# Patient Record
Sex: MALE | HISPANIC OR LATINO | Employment: OTHER | URBAN - METROPOLITAN AREA
[De-identification: names, ages, dates, MRNs, and addresses within clinical notes are randomized per-mention and may not be internally consistent; named-entity substitution may affect disease eponyms.]

---

## 2017-07-13 ENCOUNTER — OFFICE VISIT (OUTPATIENT)
Dept: MEDICAL GROUP | Facility: MEDICAL CENTER | Age: 72
End: 2017-07-13
Payer: MEDICARE

## 2017-07-13 ENCOUNTER — TELEPHONE (OUTPATIENT)
Dept: MEDICAL GROUP | Facility: MEDICAL CENTER | Age: 72
End: 2017-07-13

## 2017-07-13 VITALS
OXYGEN SATURATION: 94 % | HEIGHT: 66 IN | DIASTOLIC BLOOD PRESSURE: 72 MMHG | BODY MASS INDEX: 24.2 KG/M2 | SYSTOLIC BLOOD PRESSURE: 122 MMHG | HEART RATE: 78 BPM | TEMPERATURE: 99 F | RESPIRATION RATE: 13 BRPM | WEIGHT: 150.6 LBS

## 2017-07-13 DIAGNOSIS — J43.9 PULMONARY EMPHYSEMA, UNSPECIFIED EMPHYSEMA TYPE (HCC): ICD-10-CM

## 2017-07-13 DIAGNOSIS — E55.9 VITAMIN D DEFICIENCY: ICD-10-CM

## 2017-07-13 DIAGNOSIS — L20.82 FLEXURAL ECZEMA: ICD-10-CM

## 2017-07-13 DIAGNOSIS — F17.200 TOBACCO DEPENDENCE: ICD-10-CM

## 2017-07-13 PROBLEM — L20.84 INTRINSIC ECZEMA: Status: ACTIVE | Noted: 2017-07-13

## 2017-07-13 PROCEDURE — 99204 OFFICE O/P NEW MOD 45 MIN: CPT | Performed by: PHYSICIAN ASSISTANT

## 2017-07-13 RX ORDER — BETAMETHASONE DIPROPIONATE 0.5 MG/G
CREAM TOPICAL 2 TIMES DAILY
COMMUNITY
End: 2017-07-13

## 2017-07-13 ASSESSMENT — PATIENT HEALTH QUESTIONNAIRE - PHQ9: CLINICAL INTERPRETATION OF PHQ2 SCORE: 0

## 2017-07-13 NOTE — MR AVS SNAPSHOT
"        Jaskaran Tuckerselina   2017 10:00 AM   Office Visit   MRN: [id number]    Department:  [place] [address]   Dept Phone:  [phone]    Description:  Male : 1945   Provider:  Nate Burgess PA-C           Reason for Visit     Establish Care Med Refill for Betameth      Allergies as of 2017     No Known Allergies      You were diagnosed with     Flexural eczema   [611020]       Vitamin D deficiency   [2554090]       Tobacco dependence   [353819]       Pulmonary emphysema, unspecified emphysema type (CMS-[facility])   [8682129]         Vital Signs     Blood Pressure Pulse Temperature Respirations Height Weight    122/72 mmHg 78 37.2 °C (99 °F) 13 1.676 m (5' 6\") 68.312 kg (150 lb 9.6 oz)    Body Mass Index Oxygen Saturation Smoking Status             24.32 kg/m2 94% Current Every Day Smoker         Basic Information     Date Of Birth Sex Race Ethnicity Preferred Language    1945 Male  or   Origin (Kiswahili,Honduran,Qatari,Mc, etc) English      Problem List              ICD-10-CM Priority Class Noted - Resolved    Flexural eczema L20.82   2017 - Present    Tobacco dependence F17.200   2017 - Present    Vitamin D deficiency E55.9   2017 - Present    Pulmonary emphysema (CMS-HCC) J43.9   2017 - Present      Health Maintenance        Date Due Completion Dates    IMM DTaP/Tdap/Td Vaccine (1 - Tdap) 10/18/1966 ---    IMM ZOSTER VACCINE 10/18/2007 ---    IMM PNEUMOCOCCAL 65+ (ADULT) LOW/MEDIUM RISK SERIES (1 of 2 - PCV13) 10/18/2012 ---    IMM INFLUENZA (1) 2017 ---    COLONOSCOPY 2022 (Done)    Override on 2012: Done (10 year surveillance advised)            Current Immunizations     No immunizations on file.      Below and/or attached are the medications your provider expects you to take. Review all of your home medications and newly ordered medications with your provider and/or pharmacist. Follow medication instructions " as directed by your provider and/or pharmacist. Please keep your medication list with you and share with your provider. Update the information when medications are discontinued, doses are changed, or new medications (including over-the-counter products) are added; and carry medication information at all times in the event of emergency situations     Allergies:  No Known Allergies          Medications  Valid as of: July 13, 2017 - 10:41 AM    Generic Name Brand Name Tablet Size Instructions for use    Betamethasone Valerate (Ointment) VALISONE 0.1 % Apply twice daily as needed.        .                 Medicines prescribed today were sent to:     [facility] [address] [place] [address], [place] - [address] [place] [address]    Phone: [phone] Fax: [phone]    Open 24 Hours?: No      Medication refill instructions:       If your prescription bottle indicates you have medication refills left, it is not necessary to call your provider’s office. Please contact your pharmacy and they will refill your medication.    If your prescription bottle indicates you do not have any refills left, you may request refills at any time through one of the following ways: The online [employer] system (except Urgent Care), by calling your provider’s office, or by asking your pharmacy to contact your provider’s office with a refill request. Medication refills are processed only during regular business hours and may not be available until the next business day. Your provider may request additional information or to have a follow-up visit with you prior to refilling your medication.   *Please Note: Medication refills are assigned a new Rx number when refilled electronically. Your pharmacy may indicate that no refills were authorized even though a new prescription for the same medication is available at the pharmacy. Please request the medicine by name with the pharmacy before contacting your provider for a refill.            [employer] Access Code: Activation code not generated  Current [employer] Status: Active          Quit Tobacco Information     Do you want to quit using tobacco?    Quitting tobacco decreases risks of cancer, heart and lung disease, increases life expectancy, improves sense of taste and smell, and increases spending money, among other benefits.    If you are thinking about quitting, we can help.  • [facility] Quit Tobacco Program: 852.118.8594  o Program occurs weekly for four weeks and includes pharmacist consultation on products to support quitting smoking or chewing tobacco. A provider referral is needed for pharmacist consultation.  • Tobacco Users Help Hotline: 4-785-QUIT-NOW (530-8527) or https://[place].quitlogix.org/  o Free, confidential telephone and online coaching for [place] residents. Sessions are designed on a schedule that is convenient for you. Eligible clients receive free nicotine replacement therapy.  • Nationally: www.smokefree.gov  o Information and professional assistance to support both immediate and long-term needs as you become, and remain, a non-smoker. Smokefree.gov allows you to choose the help that best fits your needs.

## 2017-07-13 NOTE — ASSESSMENT & PLAN NOTE
Has tried every smoking cessation medication such as Chantix, Wellbutrin and patches and gum without any success.

## 2017-07-13 NOTE — Clinical Note
[employer]  Nate Burgess PA-C  [address]  [place] [address]  Fax: [phone]   Authorization for Release/Disclosure of   Protected Health Information   Name: JASKARAN PARKINSON : 1945 SSN: [id number]   Address: [address]   [place] [address] Phone:    [phone] (home)    I authorize the entity listed below to release/disclose the PHI below to:   [employer] [facility]/Nate Burgess PA-C and Nate Burgess PA-C   Provider or Entity Name: Dr. Levi Rangel     Address   [facility], [place], [place] Phone:      Fax:     Reason for request: continuity of care   Information to be released:    [ X ] LAST COLONOSCOPY,  including any PATH REPORT and follow-up  [  ] LAST FIT/COLOGUARD RESULT [  ] LAST DEXA  [  ] LAST MAMMOGRAM  [  ] LAST PAP  [  ] LAST LABS [  ] RETINA EXAM REPORT  [  ] IMMUNIZATION RECORDS  [  ] Release all info      [  ] Check here and initial the line next to each item to release ALL health information INCLUDING  _____ Care and treatment for drug and / or alcohol abuse  _____ HIV testing, infection status, or AIDS  _____ Genetic Testing    DATES OF SERVICE OR TIME PERIOD TO BE DISCLOSED: _____________  I understand and acknowledge that:  * This Authorization may be revoked at any time by you in writing, except if your health information has already been used or disclosed.  * Your health information that will be used or disclosed as a result of you signing this authorization could be re-disclosed by the recipient. If this occurs, your re-disclosed health information may no longer be protected by State or Federal laws.  * You may refuse to sign this Authorization. Your refusal will not affect your ability to obtain treatment.  * This Authorization becomes effective upon signing and will  on (date) __________.      If no date is indicated, this Authorization will  one (1) year from the signature date.    Name: Jaskaran Parkinson    Signature:   Date:     7/13/2017       PLEASE FAX REQUESTED RECORDS BACK TO: [phone]

## 2017-07-13 NOTE — TELEPHONE ENCOUNTER
Phone Number Called: [phone] (home)     Message: Left msg for patient to call back so we can scheduled appt for wellness visit with HC. Please transfer to [phone].    Left Message for patient to call back: yes

## 2017-07-13 NOTE — PROGRESS NOTES
"Subjective:   Jaskaran Le is a 71 y.o. male here today for establishing care and for refills of steroid medication for a chronic history of eczema.    Flexural eczema  This is a pleasant 71-year-old male who is here today to get a refill of steroid cream. He has flares of eczema on the arms and the head intermittently throughout his life. Typically he will use a cream and mix it with Cetaphil. The concoction will last for many months. Recently he ran out of his steroid cream and has had a flare on the flexor aspects of his arms.    He recently moved here from [place]. He is living with a daughter and is active with his grandchildren and family here in [place]. He is . He does have a RV that uses often.    He states had a colonoscopy a few years ago and was told not to come back for 10 more years. Also had an abdominal ultrasound to rule out aortic aneurysm. I was negative. Usually obtains labs yearly.    Tobacco dependence  Has tried every smoking cessation medication such as Chantix, Wellbutrin and patches and gum without any success.       Current medicines (including changes today)  Current Outpatient Prescriptions   Medication Sig Dispense Refill   • betamethasone valerate (VALISONE) 0.1 % Ointment Apply twice daily as needed. 45 g 3     No current facility-administered medications for this visit.     He  has no past medical history on file.    ROS   No chest pain, no shortness of breath, no abdominal pain and all other systems were reviewed and are negative.       Objective:     Blood pressure 122/72, pulse 78, temperature 37.2 °C (99 °F), resp. rate 13, height 1.676 m (5' 6\"), weight 68.312 kg (150 lb 9.6 oz), SpO2 94 %. Body mass index is 24.32 kg/(m^2).   Physical Exam:  Constitutional: Alert, no distress.  Skin: Warm, dry, good turgor, antecubital fossa and anterior aspect of his forearms appear with a soft erythematous raised dry rash. No excoriations noted.  Eye: Equal, round and " reactive, conjunctiva clear, lids normal.  ENMT: Lips without lesions, good dentition, oropharynx clear.  Neck: Trachea midline, no masses.   Lymph: No cervical or supraclavicular lymphadenopathy  Respiratory: Unlabored respiratory effort, lungs appear clear, no wheezes.  Cardiovascular: Regular rate and rhythm, no edema.  Abdomen: Soft, non-tender, no masses.  Psych: Alert and oriented x3, normal affect and mood.        Assessment and Plan:   The following treatment plan was discussed    1. Flexural eczema  Chronic condition with recent exacerbation. Prior prescription for betamethasone ointment as directed. Follow-up as needed. We'll obtain previous medical records from his PCP in [place].    2. Tobacco dependence  In the future will refer to lung cancer screening program.    Addendum: Once medical records are obtained from his previous medical provider we will schedule him for his annual wellness exam.      Followup: Return in about 1 month (around 8/13/2017), or if symptoms worsen or fail to improve.    Please note that this dictation was created using voice recognition software. I have made every reasonable attempt to correct obvious errors, but I expect that there are errors of grammar and possibly content that I did not discover before finalizing the note.

## 2017-07-13 NOTE — ASSESSMENT & PLAN NOTE
This is a pleasant 71-year-old male who is here today to get a refill of steroid cream. He has flares of eczema on the arms and the head intermittently throughout his life. Typically he will use a cream and mix it with Cetaphil. The concoction will last for many months. Recently he ran out of his steroid cream and has had a flare on the flexor aspects of his arms.    He recently moved here from [place]. He is living with a daughter and is active with his grandchildren and family here in [place]. He is . He does have a RV that uses often.    He had a colonoscopy in 2012 in November. At 8.4 cm polyp that was hyperplastic in nature. Advised follow-up in 10 years for surveillance colonoscopy. Ultrasound of his abdominal showed no AAA. Lab profile did show low vitamin D level.    Appears also that he had a Pneumovax 23 in the past and did receive a Prevnar 13 in 2015.

## 2018-03-08 ENCOUNTER — PATIENT OUTREACH (OUTPATIENT)
Dept: HEALTH INFORMATION MANAGEMENT | Facility: OTHER | Age: 73
End: 2018-03-08

## 2018-03-08 NOTE — PROGRESS NOTES
1. Attempt #: Final  2. HealthConnect Verified: yes    3. Verify PCP: yes    4. Care Team Updated:       •   DME Company (gait device, O2, CPAP, etc.): NO       •   Other Specialists (eye doctor, derm, GYN, cardiology, endo, etc): NO    5.  Reviewed/Updated the following with patient:       •   Communication Preference Obtained? YES       •   Preferred Pharmacy? YES       •   Preferred Lab? YES       •   Family History (document living status of immediate family members and if + hx of cancer, diabetes, hypertension, hyperlipidemia, heart attack, stroke) YES. Was Abstract Encounter opened and chart updated? YES    6. [employer] Activation: already active    7. [employer] Rich: yes    8. Annual Wellness Visit Scheduling  Scheduling Status:Scheduled      9. Care Gap Scheduling (Attempt to Schedule EACH Overdue Care Gap!)     Health Maintenance Due   Topic Date Due   • Annual Wellness Visit  1945   • IMM DTaP/Tdap/Td Vaccine (1 - Tdap) 10/18/1966   • IMM ZOSTER VACCINE  10/18/2007   • IMM PNEUMOCOCCAL 65+ (ADULT) LOW/MEDIUM RISK SERIES (1 of 2 - PCV13) 10/18/2012   • IMM INFLUENZA (1) 09/01/2017        Scheduled patient for Annual Wellness Visit    10. Patient was advised: “This is a free wellness visit. The provider will screen for medical conditions to help you stay healthy. If you have other concerns to address you may be asked to discuss these at a separate visit or there may be an additional fee.”     11. Patient was informed to arrive 15 min prior to their scheduled appointment and bring in their medication bottles.

## 2018-03-12 ENCOUNTER — TELEPHONE (OUTPATIENT)
Dept: MEDICAL GROUP | Facility: MEDICAL CENTER | Age: 73
End: 2018-03-12

## 2018-03-12 NOTE — TELEPHONE ENCOUNTER
Future Appointments       Provider Department Center    3/19/2018 9:40 AM Nate Burgess P.A.-C.; [facility]        ANNUAL WELLNESS VISIT PRE-VISIT PLANNING WITH OUTREACH    1.  Immunizations were updated in Epic using WebIZ?:Yes       •  WebIZ Recommendations: PREVNAR (PCV13) , TDAP and ZOSTAVAX (Shingles)       •  Is patient due for Tdap? YES. Patient was not notified of copay/out of pocket cost.       •  Is patient due for Shingles?YES. Patient was not notified of copay/out of pocket cost.    2.  MDX printed and highlighted for Provider? NO

## 2018-03-19 ENCOUNTER — OFFICE VISIT (OUTPATIENT)
Dept: MEDICAL GROUP | Facility: MEDICAL CENTER | Age: 73
End: 2018-03-19
Payer: MEDICARE

## 2018-03-19 VITALS
RESPIRATION RATE: 16 BRPM | DIASTOLIC BLOOD PRESSURE: 72 MMHG | HEART RATE: 79 BPM | SYSTOLIC BLOOD PRESSURE: 124 MMHG | WEIGHT: 153 LBS | OXYGEN SATURATION: 96 % | BODY MASS INDEX: 24.59 KG/M2 | TEMPERATURE: 97.1 F | HEIGHT: 66 IN

## 2018-03-19 DIAGNOSIS — J43.9 PULMONARY EMPHYSEMA, UNSPECIFIED EMPHYSEMA TYPE (HCC): ICD-10-CM

## 2018-03-19 DIAGNOSIS — L20.82 FLEXURAL ECZEMA: ICD-10-CM

## 2018-03-19 DIAGNOSIS — Z23 NEED FOR SHINGLES VACCINE: ICD-10-CM

## 2018-03-19 DIAGNOSIS — F17.200 TOBACCO DEPENDENCE: ICD-10-CM

## 2018-03-19 DIAGNOSIS — Z00.00 MEDICARE ANNUAL WELLNESS VISIT, INITIAL: ICD-10-CM

## 2018-03-19 DIAGNOSIS — E55.9 VITAMIN D DEFICIENCY: ICD-10-CM

## 2018-03-19 PROCEDURE — G0439 PPPS, SUBSEQ VISIT: HCPCS | Mod: 25 | Performed by: PHYSICIAN ASSISTANT

## 2018-03-19 ASSESSMENT — PATIENT HEALTH QUESTIONNAIRE - PHQ9: CLINICAL INTERPRETATION OF PHQ2 SCORE: 0

## 2018-03-19 ASSESSMENT — PAIN SCALES - GENERAL: PAINLEVEL: 1=MINIMAL PAIN

## 2018-03-19 ASSESSMENT — ACTIVITIES OF DAILY LIVING (ADL): BATHING_REQUIRES_ASSISTANCE: 0

## 2018-03-19 NOTE — LETTER
[facility] [employer] [facility]  Nate Burgess P.A.-C.  [address]  [place] [address]  Fax: [phone]   Authorization for Release/Disclosure of   Protected Health Information   Name: JASKARAN LE : 1945 SSN: [id number]   Address: [address] [place] [address]   [place] [address] Phone:    [phone] (home)    I authorize the entity listed below to release/disclose the PHI below to:   [facility]/Nate Burgess P.A.-C. and Nate Burgess P.A.-C.   Provider or Entity Name:     Address   City, State, Zip   Phone:      Fax:     Reason for request: continuity of care   Information to be released:    [  ] LAST COLONOSCOPY,  including any PATH REPORT and follow-up  [  ] LAST FIT/COLOGUARD RESULT [  ] LAST DEXA  [  ] LAST MAMMOGRAM  [  ] LAST PAP  [  ] LAST LABS [  ] RETINA EXAM REPORT  [  ] IMMUNIZATION RECORDS  [  ] Release all info      [  ] Check here and initial the line next to each item to release ALL health information INCLUDING  _____ Care and treatment for drug and / or alcohol abuse  _____ HIV testing, infection status, or AIDS  _____ Genetic Testing    DATES OF SERVICE OR TIME PERIOD TO BE DISCLOSED: _____________  I understand and acknowledge that:  * This Authorization may be revoked at any time by you in writing, except if your health information has already been used or disclosed.  * Your health information that will be used or disclosed as a result of you signing this authorization could be re-disclosed by the recipient. If this occurs, your re-disclosed health information may no longer be protected by State or Federal laws.  * You may refuse to sign this Authorization. Your refusal will not affect your ability to obtain treatment.  * This Authorization becomes effective upon signing and will  on (date) __________.      If no date is indicated, this Authorization will  one (1) year from the signature date.    Name: Jaskaran Le    Signature:   Date:      3/19/2018       PLEASE FAX REQUESTED RECORDS BACK TO: [phone]

## 2018-12-26 ENCOUNTER — PATIENT OUTREACH (OUTPATIENT)
Dept: HEALTH INFORMATION MANAGEMENT | Facility: OTHER | Age: 73
End: 2018-12-26

## 2018-12-26 NOTE — PROGRESS NOTES
1. Attempt #:1    2. HealthConnect Verified: yes    3. Verify PCP: yes    4. Review Care Team: yes    5. WebIZ Checked & Epic Updated: Yes  ·     6. Reviewed/Updated the following with patient:       •   Communication Preference Obtained? YES       •   Preferred Pharmacy? YES       •   Preferred Lab? YES       •   Family History (document living status of immediate family members and if + hx of cancer, diabetes, hypertension, hyperlipidemia, heart attack, stroke) YES    7. Annual Wellness Visit Scheduling  · Scheduling Status:Scheduled     8. Care Gap Scheduling (Attempt to Schedule EACH Overdue Care Gap!)     Health Maintenance Due   Topic Date Due   • IMM ZOSTER VACCINES (1 of 2) 10/18/1997   • IMM PNEUMOCOCCAL 65+ (ADULT) LOW/MEDIUM RISK SERIES (1 of 2 - PCV13) 10/18/2012   • IMM DTaP/Tdap/Td Vaccine (1 - Tdap) 10/04/2014   • IMM INFLUENZA (1) 09/01/2018        Scheduled patient for Annual Wellness Visit   SCHEDULED IMMUNIZATIONS     9. [employer] Activation: already active    10. [employer] Rich: no    11. Virtual Visits: no    12. Opt In to Text Messages: no    13. Patient was advised: “This is a free wellness visit. The provider will screen for medical conditions to help you stay healthy. If you have other concerns to address you may be asked to discuss these at a separate visit or there may be an additional fee.”     14. Patient was informed to arrive 15 min prior to their scheduled appointment and bring in their medication bottles.

## 2019-01-08 ENCOUNTER — TELEPHONE (OUTPATIENT)
Dept: MEDICAL GROUP | Facility: MEDICAL CENTER | Age: 74
End: 2019-01-08

## 2019-01-08 NOTE — TELEPHONE ENCOUNTER
Future Appointments       Provider Department Center    1/15/2019 3:20 PM Nate Burgess P.A.-C.; [facility]        ANNUAL WELLNESS VISIT PRE-VISIT PLANNING WITH OUTREACH    1.  If any orders were placed at last visit, do we have Results/Consult Notes?        •  Labs - Labs were not ordered at last office visit.  Note: If patient appointment is for lab review and patient did not complete labs, check with provider if OK to reschedule patient until labs completed.       •  Imaging - Imaging was not ordered at last office visit.       •  Referrals - No referrals were ordered at last office visit.    2.  Immunizations were updated in Epic using WebIZ?:Yes       •  WebIZ Recommendations: PREVNAR (PCV13)  and TDAP       •  Is patient due for Tdap? YES. Patient was not notified of copay/out of pocket cost.       •  Is patient due for Shingrx? NO    3.  Patient has the following Care Path diagnoses on Problem List:  NONE    4.  Orders for overdue Health Maintenance topics pended in Pre-Charting? NO

## 2019-01-15 ENCOUNTER — OFFICE VISIT (OUTPATIENT)
Dept: MEDICAL GROUP | Facility: MEDICAL CENTER | Age: 74
End: 2019-01-15
Payer: MEDICARE

## 2019-01-15 VITALS
HEART RATE: 73 BPM | DIASTOLIC BLOOD PRESSURE: 72 MMHG | HEIGHT: 66 IN | WEIGHT: 156.4 LBS | OXYGEN SATURATION: 95 % | BODY MASS INDEX: 25.13 KG/M2 | TEMPERATURE: 98.3 F | SYSTOLIC BLOOD PRESSURE: 108 MMHG

## 2019-01-15 DIAGNOSIS — Z23 NEED FOR TDAP VACCINATION: ICD-10-CM

## 2019-01-15 DIAGNOSIS — J43.9 PULMONARY EMPHYSEMA, UNSPECIFIED EMPHYSEMA TYPE (HCC): ICD-10-CM

## 2019-01-15 DIAGNOSIS — F17.200 TOBACCO DEPENDENCE: ICD-10-CM

## 2019-01-15 DIAGNOSIS — Z00.00 MEDICARE ANNUAL WELLNESS VISIT, INITIAL: ICD-10-CM

## 2019-01-15 DIAGNOSIS — L98.491 SKIN ULCER, LIMITED TO BREAKDOWN OF SKIN (HCC): ICD-10-CM

## 2019-01-15 DIAGNOSIS — E55.9 VITAMIN D DEFICIENCY: ICD-10-CM

## 2019-01-15 DIAGNOSIS — L20.82 FLEXURAL ECZEMA: ICD-10-CM

## 2019-01-15 DIAGNOSIS — Z23 NEED FOR VACCINATION WITH 13-POLYVALENT PNEUMOCOCCAL CONJUGATE VACCINE: ICD-10-CM

## 2019-01-15 PROCEDURE — 90472 IMMUNIZATION ADMIN EACH ADD: CPT | Performed by: PHYSICIAN ASSISTANT

## 2019-01-15 PROCEDURE — G0439 PPPS, SUBSEQ VISIT: HCPCS | Performed by: PHYSICIAN ASSISTANT

## 2019-01-15 PROCEDURE — 90670 PCV13 VACCINE IM: CPT | Performed by: PHYSICIAN ASSISTANT

## 2019-01-15 PROCEDURE — G0009 ADMIN PNEUMOCOCCAL VACCINE: HCPCS | Performed by: PHYSICIAN ASSISTANT

## 2019-01-15 PROCEDURE — 90715 TDAP VACCINE 7 YRS/> IM: CPT | Performed by: PHYSICIAN ASSISTANT

## 2019-01-15 RX ORDER — CLOTRIMAZOLE 1 %
CREAM (GRAM) TOPICAL
Qty: 1 TUBE | Refills: 0 | Status: SHIPPED | OUTPATIENT
Start: 2019-01-15 | End: 2019-02-28 | Stop reason: SDUPTHER

## 2019-01-15 ASSESSMENT — ENCOUNTER SYMPTOMS: GENERAL WELL-BEING: FAIR

## 2019-01-15 ASSESSMENT — PATIENT HEALTH QUESTIONNAIRE - PHQ9: CLINICAL INTERPRETATION OF PHQ2 SCORE: 0

## 2019-01-15 ASSESSMENT — PAIN SCALES - GENERAL: PAINLEVEL: NO PAIN

## 2019-01-15 ASSESSMENT — ACTIVITIES OF DAILY LIVING (ADL): BATHING_REQUIRES_ASSISTANCE: 0

## 2019-01-15 NOTE — PROGRESS NOTES
Chief Complaint   Patient presents with   • Annual Wellness Visit         HPI:  This is a 73-year-old male who is here today for an annual physical.  On his right foot lateral aspect has had a lesion for approximately 3 months.  Noticed after swimming.  Goes a lot to the  spot.  Complains that it has been there.  Denies any pain.  Denies any itching.  Has not tried any of his creams or anything to help improve it.  No history of diabetes.  Emphysema is stable.  Denies any concerns with coughing.  Does not want to stop smoking.  Interested in a refill as well of his eczema medication.  Requesting vaccination updates.      Patient Active Problem List    Diagnosis Date Noted   • Skin ulcer, limited to breakdown of skin ([facility]) 01/15/2019   • Medicare annual wellness visit, initial 03/19/2018   • Flexural eczema 07/13/2017   • Tobacco dependence 07/13/2017   • Vitamin D deficiency 07/13/2017   • Pulmonary emphysema ([facility]) 07/13/2017       Current Outpatient Prescriptions   Medication Sig Dispense Refill   • clotrimazole (LOTRIMIN) 1 % Cream Apply twice daily x 14 days. 1 Tube 0   • betamethasone valerate (VALISONE) 0.1 % Ointment Apply twice daily as needed. 45 g 3     No current facility-administered medications for this visit.         Patient is taking medications as noted in medication list.  Current supplements as per medication list.     Allergies: Patient has no known allergies.    Current social contact/activities: Pt likes to babsit his grandkids and great grandkids. Pt likes to go [email or web address] and goes to the health spa quite often.     Is patient current with immunizations? No, due for PREVNAR (PCV13)  and SHINGRIX (Shingles). Patient is interested in receiving PREVNAR (PCV13)  and TDAP today.    He  reports that he has been smoking Cigarettes.  He has been smoking about 1.00 pack per day. He has never used smokeless tobacco. He reports that he does not drink alcohol or use drugs.  Ready to quit: No  Counseling  given: Yes        DPA/Advanced directive: Patient does not have an Advanced Directive.  A packet and workshop information was given on Advanced Directives.    ROS:    Gait: Uses no assistive device   Ostomy: No   Other tubes: No   Amputations: No   Chronic oxygen use No   Last eye exam 2 years ago   Wears hearing aids: No   : Denies any urinary leakage during the last 6 months      Depression Screening    Little interest or pleasure in doing things?  0 - not at all  Feeling down, depressed, or hopeless? 0 - not at all  Patient Health Questionnaire Score: 0    If depressive symptoms identified deferred to follow up visit unless specifically addressed in assessment and plan.    Interpretation of PHQ-9 Total Score   Score Severity   1-4 No Depression   5-9 Mild Depression   10-14 Moderate Depression   15-19 Moderately Severe Depression   20-27 Severe Depression    Screening for Cognitive Impairment    Three Minute Recall (leader, season, table)  1/3    Pablo clock face with all 12 numbers and set the hands to show 10 past 11.  Yes 5/5  If cognitive concerns identified, deferred for follow up unless specifically addressed in assessment and plan.    Fall Risk Assessment    Has the patient had two or more falls in the last year or any fall with injury in the last year?  No  If fall risk identified, deferred for follow up unless specifically addressed in assessment and plan.    Safety Assessment    Throw rugs on floor.  No  Handrails on all stairs.  Yes  Good lighting in all hallways.  Yes  Difficulty hearing.  No  Patient counseled about all safety risks that were identified.    Functional Assessment ADLs    Are there any barriers preventing you from cooking for yourself or meeting nutritional needs?  No.    Are there any barriers preventing you from driving safely or obtaining transportation?  No.    Are there any barriers preventing you from using a telephone or calling for help?  No.    Are there any barriers  "preventing you from shopping?  No.    Are there any barriers preventing you from taking care of your own finances?  No.    Are there any barriers preventing you from managing your medications?  No.    Are there any barriers preventing you from showering, bathing or dressing yourself?  No.    Are you currently engaging in any exercise or physical activity?  Yes.  Pt swims 4-5 x weekly  What is your perception of your health?  Fair.    Health Maintenance Summary                IMM PNEUMOCOCCAL 65+ (ADULT) LOW/MEDIUM RISK SERIES Overdue 10/18/2012     IMM DTaP/Tdap/Td Vaccine Overdue 10/4/2014      Done 10/3/2014 Imm Admin: TD Vaccine    Annual Wellness Visit Next Due 3/20/2019      Done 3/19/2018 Visit Dx: Medicare annual wellness visit, initial     Patient has more history with this topic...    COLONOSCOPY Next Due 11/1/2022      Done 11/1/2012 10 year surveillance advised          Patient Care Team:  Nate Burgess P.A.-C. as PCP - General (Family Medicine)    Social History   Substance Use Topics   • Smoking status: Current Every Day Smoker     Packs/day: 1.00     Types: Cigarettes   • Smokeless tobacco: Never Used   • Alcohol use No      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     Family History   Problem Relation Age of Onset   • Dementia Mother    • Lung Disease Father    • Cancer Father         Lung CA   • Dementia Sister    • Heart Disease Brother    • Hypertension Brother    • Dementia Sister         Alzheimer's   • Diabetes Neg Hx      He  has no past medical history on file.   History reviewed. No pertinent surgical history.        Exam:     Blood pressure 108/72, pulse 73, temperature 36.8 °C (98.3 °F), temperature source Temporal, height 1.676 m (5' 6\"), weight 70.9 kg (156 lb 6.4 oz), SpO2 95 %. Body mass index is 25.24 kg/m².    Hearing good.    Dentition poor  Alert, oriented in no acute distress.  Eye contact is good, speech goal directed, affect calm      Assessment and Plan. The following treatment " and monitoring plan is recommended:    1. Medicare annual wellness visit, initial     2. Need for Tdap vaccination  TDAP VACCINE =>8YO IM   3. Need for vaccination with 13-polyvalent pneumococcal conjugate vaccine  Pneumococcal Conjugate Vaccine 13-Valent <4yo IM   4. Flexural eczema  betamethasone valerate (VALISONE) 0.1 % Ointment   5. Pulmonary emphysema, unspecified emphysema type (HCC)     6. Tobacco dependence     7. Vitamin D deficiency     8. Skin ulcer, limited to breakdown of skin (HCC)  REFERRAL TO DERMATOLOGY    clotrimazole (LOTRIMIN) 1 % Cream     Referred to dermatology for evaluation of the skin lesion on his foot.  Does appear with his history to possibly be tinea pedis so provided him a Chlortrimazole cream.    Services suggested: No services needed at this time  Health Care Screening recommendations as per orders if indicated.  Referrals offered: PT/OT/Nutrition counseling/Behavioral Health/Smoking cessation as per orders if indicated.    Discussion today about general wellness and lifestyle habits:    · Prevent falls and reduce trip hazards; Cautioned about securing or removing rugs.  · Have a working fire alarm and carbon monoxide detector;   · Engage in regular physical activity and social activities       Follow-up: Return in about 6 months (around 7/15/2019).

## 2019-01-16 NOTE — ASSESSMENT & PLAN NOTE
Chronic condition.  Stable.  Denies any shortness of breath or chest pain.  No coughing.  Still continues to smoke.

## 2019-01-16 NOTE — ASSESSMENT & PLAN NOTE
This is a 73-year-old male who is here today for an annual physical.  On his right foot lateral aspect has had a lesion for approximately 3 months.  Noticed after swimming.  Goes a lot to the  spot.  Complains that it has been there.  Denies any pain.  Denies any itching.  Has not tried any of his creams or anything to help improve it.  No history of diabetes.  Emphysema is stable.  Denies any concerns with coughing.  Does not want to stop smoking.  Interested in a refill as well of his eczema medication.  Requesting vaccination updates.

## 2019-02-28 DIAGNOSIS — L98.491 SKIN ULCER, LIMITED TO BREAKDOWN OF SKIN (HCC): ICD-10-CM

## 2019-03-01 RX ORDER — CLOTRIMAZOLE 1 %
CREAM (GRAM) TOPICAL
Qty: 1 TUBE | Refills: 0 | Status: SHIPPED | OUTPATIENT
Start: 2019-03-01 | End: 2019-12-04

## 2019-04-17 ENCOUNTER — OFFICE VISIT (OUTPATIENT)
Dept: URGENT CARE | Facility: PHYSICIAN GROUP | Age: 74
End: 2019-04-17
Payer: MEDICARE

## 2019-04-17 VITALS
OXYGEN SATURATION: 92 % | HEART RATE: 82 BPM | TEMPERATURE: 98.1 F | SYSTOLIC BLOOD PRESSURE: 144 MMHG | WEIGHT: 158 LBS | BODY MASS INDEX: 25.39 KG/M2 | RESPIRATION RATE: 18 BRPM | DIASTOLIC BLOOD PRESSURE: 88 MMHG | HEIGHT: 66 IN

## 2019-04-17 DIAGNOSIS — J44.0 COPD WITH ACUTE LOWER RESPIRATORY INFECTION (HCC): ICD-10-CM

## 2019-04-17 PROCEDURE — 99214 OFFICE O/P EST MOD 30 MIN: CPT | Performed by: NURSE PRACTITIONER

## 2019-04-17 RX ORDER — AZITHROMYCIN 250 MG/1
TABLET, FILM COATED ORAL
Qty: 6 TAB | Refills: 0 | Status: SHIPPED | OUTPATIENT
Start: 2019-04-17 | End: 2019-12-04

## 2019-04-17 RX ORDER — ALBUTEROL SULFATE 90 UG/1
2 AEROSOL, METERED RESPIRATORY (INHALATION) EVERY 6 HOURS PRN
Qty: 8.5 G | Refills: 0 | Status: SHIPPED | OUTPATIENT
Start: 2019-04-17 | End: 2019-12-04

## 2019-04-17 RX ORDER — PREDNISONE 20 MG/1
TABLET ORAL
Qty: 10 TAB | Refills: 0 | Status: SHIPPED | OUTPATIENT
Start: 2019-04-17 | End: 2019-12-04

## 2019-04-17 ASSESSMENT — ENCOUNTER SYMPTOMS
SORE THROAT: 0
HEADACHES: 0
DIARRHEA: 0
SPUTUM PRODUCTION: 1
CHILLS: 0
COUGH: 1
EYE DISCHARGE: 0
SHORTNESS OF BREATH: 1
WHEEZING: 0
FEVER: 0
ORTHOPNEA: 0
MYALGIAS: 0
NAUSEA: 0

## 2019-04-17 NOTE — PROGRESS NOTES
Subjective:      Jaskaran Le is a 73 y.o. male who presents with Cough (Congestion x 1 day)            HPI New. 73 year old male with 73 year old male with cough and congestion for one day. He has associated shortness of breath and fatigue. Cough is productive. Denies any fever, chills, myalgia, sore throat or nausea. History relevant for COPD, no medications taken for this. He has used zicam for current symptoms. Main concern is the shortness of breath.  Patient has no known allergies.  Current Outpatient Prescriptions on File Prior to Visit   Medication Sig Dispense Refill   • clotrimazole (LOTRIMIN) 1 % Cream Apply twice daily x 14 days. 1 Tube 0   • betamethasone valerate (VALISONE) 0.1 % Ointment Apply twice daily as needed. 45 g 3     No current facility-administered medications on file prior to visit.      Social History     Social History   • Marital status:      Spouse name: N/A   • Number of children: N/A   • Years of education: N/A     Occupational History   • Not on file.     Social History Main Topics   • Smoking status: Current Every Day Smoker     Packs/day: 1.00     Types: Cigarettes   • Smokeless tobacco: Never Used   • Alcohol use No      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   • Drug use: No   • Sexual activity: Not Currently     Other Topics Concern   • Not on file     Social History Narrative   • No narrative on file     family history includes Cancer in his father; Dementia in his mother, sister, and sister; Heart Disease in his brother; Hypertension in his brother; Lung Disease in his father.      Review of Systems   Constitutional: Positive for malaise/fatigue. Negative for chills and fever.   HENT: Positive for congestion. Negative for sore throat.    Eyes: Negative for discharge.   Respiratory: Positive for cough, sputum production and shortness of breath. Negative for wheezing.    Cardiovascular: Negative for chest pain and orthopnea.   Gastrointestinal: Negative for  "diarrhea and nausea.   Musculoskeletal: Negative for myalgias.   Neurological: Negative for headaches.   Endo/Heme/Allergies: Negative for environmental allergies.          Objective:     /88   Pulse 82   Temp 36.7 °C (98.1 °F) (Temporal)   Resp 18   Ht 1.676 m (5' 6\")   Wt 71.7 kg (158 lb)   SpO2 92%   BMI 25.50 kg/m²      Physical Exam   Constitutional: He is oriented to person, place, and time. He appears well-developed and well-nourished. No distress.   HENT:   Head: Normocephalic and atraumatic.   Right Ear: External ear and ear canal normal. Tympanic membrane is not injected and not perforated. No middle ear effusion.   Left Ear: External ear and ear canal normal. Tympanic membrane is not injected and not perforated.  No middle ear effusion.   Nose: Mucosal edema present.   Mouth/Throat: Posterior oropharyngeal erythema present. No oropharyngeal exudate.   Eyes: Conjunctivae are normal. Right eye exhibits no discharge. Left eye exhibits no discharge.   Neck: Normal range of motion. Neck supple.   Cardiovascular: Normal rate, regular rhythm and normal heart sounds.    No murmur heard.  Pulmonary/Chest: Effort normal. No respiratory distress. He has wheezes.   End expiratory wheezing, diminished expiratory breath sounds throughout.   Musculoskeletal: Normal range of motion.   Normal movement of all 4 extremities.   Lymphadenopathy:     He has no cervical adenopathy.        Right: No supraclavicular adenopathy present.        Left: No supraclavicular adenopathy present.   Neurological: He is alert and oriented to person, place, and time. Gait normal.   Skin: Skin is warm and dry.   Psychiatric: He has a normal mood and affect. His behavior is normal. Thought content normal.   Nursing note and vitals reviewed.              Assessment/Plan:     1. COPD with acute lower respiratory infection (HCC)  azithromycin (ZITHROMAX) 250 MG Tab    predniSONE (DELTASONE) 20 MG Tab    albuterol 108 (90 Base) MCG/ACT " Aero Soln inhalation aerosol     Differential diagnosis, natural history, supportive care, and indications for immediate follow-up discussed at length.   Instructions given on use of inhaler.

## 2019-06-07 ENCOUNTER — OFFICE VISIT (OUTPATIENT)
Dept: URGENT CARE | Facility: PHYSICIAN GROUP | Age: 74
End: 2019-06-07
Payer: MEDICARE

## 2019-06-07 VITALS
BODY MASS INDEX: 24.91 KG/M2 | OXYGEN SATURATION: 91 % | HEART RATE: 82 BPM | WEIGHT: 155 LBS | HEIGHT: 66 IN | TEMPERATURE: 97.8 F | SYSTOLIC BLOOD PRESSURE: 120 MMHG | DIASTOLIC BLOOD PRESSURE: 66 MMHG

## 2019-06-07 DIAGNOSIS — S01.01XA LACERATION OF SCALP, INITIAL ENCOUNTER: ICD-10-CM

## 2019-06-07 PROCEDURE — 12002 RPR S/N/AX/GEN/TRNK2.6-7.5CM: CPT | Performed by: FAMILY MEDICINE

## 2019-06-07 ASSESSMENT — ENCOUNTER SYMPTOMS
BLURRED VISION: 0
FOCAL WEAKNESS: 0
BRUISES/BLEEDS EASILY: 0
SENSORY CHANGE: 0
WEIGHT LOSS: 0
ABDOMINAL PAIN: 0

## 2019-06-07 NOTE — PROGRESS NOTES
"Subjective:      Jaskaran Le is a 73 y.o. male who presents with Head Injury (pt fell in spa and hit the floor or bench, head laceration, onset 30 mins ago  )            Onset today mechanical ground-level fall striking back of head.  Scalp laceration.  Bleeding controlled with direct pressure.  No headache.  No vision change.  No loss of consciousness.  No vomiting.  No neck pain.  Mild sacrum and coccyx pain.  No radiation.  No myelopathy.  No other aggravating or alleviating factors.        Review of Systems   Constitutional: Negative for malaise/fatigue and weight loss.   Eyes: Negative for blurred vision.   Gastrointestinal: Negative for abdominal pain.   Genitourinary: Negative for frequency and hematuria.   Skin: Negative for itching and rash.   Neurological: Negative for sensory change and focal weakness.   Endo/Heme/Allergies: Does not bruise/bleed easily.          Objective:     /66 (BP Location: Right arm, Patient Position: Sitting, BP Cuff Size: Adult)   Pulse 82   Temp 36.6 °C (97.8 °F) (Temporal)   Ht 1.676 m (5' 6\")   Wt 70.3 kg (155 lb)   SpO2 91%   BMI 25.02 kg/m²      Physical Exam   Constitutional: He appears well-developed and well-nourished. No distress.   HENT:   Head: Normocephalic.       Eyes: Pupils are equal, round, and reactive to light. EOM are normal.   Neck: Normal range of motion. Neck supple.   No midline or axial load tenderness   Cardiovascular: Normal rate, regular rhythm and normal heart sounds.    No murmur heard.  Pulmonary/Chest: Effort normal and breath sounds normal. He has no wheezes.   Neurological: He is alert. No cranial nerve deficit. Coordination normal.   Skin: Skin is warm and dry.          .  Procedure: Laceration Repair  -Risks including bleeding, nerve damage, infection, and poor cosmetic outcome discussed at length. Benefits and alternatives discussed.   -Sterile technique throughout  -Local anesthesia with 2% lidocaine  -Closed with #5 " staples with good wound approximation  -Patient tolerated well           Assessment/Plan:     1. Laceration of scalp, initial encounter       Differential diagnosis, natural history, supportive care, and indications for immediate follow-up discussed at length.     Staple removal in 10 days.    Wound care discussed

## 2019-06-17 ENCOUNTER — OFFICE VISIT (OUTPATIENT)
Dept: URGENT CARE | Facility: PHYSICIAN GROUP | Age: 74
End: 2019-06-17
Payer: MEDICARE

## 2019-06-17 VITALS
TEMPERATURE: 98 F | OXYGEN SATURATION: 94 % | RESPIRATION RATE: 16 BRPM | HEIGHT: 66 IN | SYSTOLIC BLOOD PRESSURE: 118 MMHG | WEIGHT: 155 LBS | BODY MASS INDEX: 24.91 KG/M2 | HEART RATE: 74 BPM | DIASTOLIC BLOOD PRESSURE: 70 MMHG

## 2019-06-17 DIAGNOSIS — Z48.02 ENCOUNTER FOR STAPLE REMOVAL: ICD-10-CM

## 2019-06-17 PROCEDURE — 99024 POSTOP FOLLOW-UP VISIT: CPT | Performed by: PHYSICIAN ASSISTANT

## 2019-06-17 ASSESSMENT — ENCOUNTER SYMPTOMS
DIZZINESS: 0
FEVER: 0
TINGLING: 0
HEADACHES: 0
CHILLS: 0

## 2019-06-17 NOTE — PROGRESS NOTES
"Subjective:      Jaskaran Le is a 73 y.o. male who presents with Suture / Staple Removal (back of scalp )            Suture / Staple Removal   The sutures were placed 7 to 10 days ago. There has been no drainage from the wound. There is no redness present. There is no swelling present. There is no pain present.     Patient presents today for staple removal.  Patient reports no issues with the staples and feels as if they are ready to be removed given that they have been itching him recently.  Review of Systems   Constitutional: Negative for chills and fever.   Neurological: Negative for dizziness, tingling and headaches.          Objective:     /70   Pulse 74   Temp 36.7 °C (98 °F)   Resp 16   Ht 1.676 m (5' 6\")   Wt 70.3 kg (155 lb)   SpO2 94%   BMI 25.02 kg/m²      Physical Exam   Constitutional: Vital signs are normal. He appears well-developed and well-nourished. No distress.   HENT:   Head: Normocephalic and atraumatic.       Right Ear: Hearing normal.   Left Ear: Hearing normal.   5 staples intact with well-healing wound scab in place with no drainage or signs of infection.  Wound with great approximation.   Eyes: Pupils are equal, round, and reactive to light.   Cardiovascular: Normal rate, regular rhythm and normal heart sounds.    Pulmonary/Chest: Effort normal.   Musculoskeletal:   Normal movement in all 4 extremities   Neurological: He is alert. Coordination normal.   Skin: Skin is warm and dry.   Psychiatric: He has a normal mood and affect.   Nursing note and vitals reviewed.              Assessment/Plan:     1. Encounter for staple removal  5 staples removed today, patient tolerated well.  No bleeding upon staple removal. Patient given instructions to gently clean the area with soapy water.  Cole Redding PA-C        "

## 2019-08-09 ENCOUNTER — TELEPHONE (OUTPATIENT)
Dept: MEDICAL GROUP | Facility: MEDICAL CENTER | Age: 74
End: 2019-08-09

## 2019-08-09 NOTE — LETTER
[employer]  Nate Burgess P.A.-C.  [address], [place] [address]  Fax: [phone]   Authorization for Release/Disclosure of   Protected Health Information   Name: JASKARAN PARKINSON : 1945 SSN: [id number]   Address: [address] [place] [address]   [place] [address] Phone:    [phone] (home)    I authorize the entity listed below to release/disclose the PHI below to:   [employer] [facility]/Nate Burgess P.A.-C.    Provider or Entity Name:  Levi Rangel M.D.   Address   [facility], [facility], [facility]  [address] [address] Phone:  [phone]    Fax:  [phone]   Reason for request: continuity of care   Information to be released:    [X] LAST COLONOSCOPY,  including any PATH REPORT and follow-up  [X ] LAST FIT/COLOGUARD RESULT [  ] LAST DEXA  [  ] LAST MAMMOGRAM  [  ] LAST PAP  [  ] LAST LABS [  ] RETINA EXAM REPORT  [  ] IMMUNIZATION RECORDS  [  ] Release all info      [  ] Check here and initial the line next to each item to release ALL health information INCLUDING  _____ Care and treatment for drug and / or alcohol abuse  _____ HIV testing, infection status, or AIDS  _____ Genetic Testing    DATES OF SERVICE OR TIME PERIOD TO BE DISCLOSED: _____________  I understand and acknowledge that:  * This Authorization may be revoked at any time by you in writing, except if your health information has already been used or disclosed.  * Your health information that will be used or disclosed as a result of you signing this authorization could be re-disclosed by the recipient. If this occurs, your re-disclosed health information may no longer be protected by State or Federal laws.  * You may refuse to sign this Authorization. Your refusal will not affect your ability to obtain treatment.  * This Authorization becomes effective upon signing and will  on (date) __________.      If no date is indicated, this Authorization will  one (1) year from the signature date.    Name: Jaskaran Fontenot  Mimi    Signature: continuity of care   Date:     8/9/2019       PLEASE FAX REQUESTED RECORDS BACK TO: [phone]

## 2019-08-09 NOTE — TELEPHONE ENCOUNTER
Colorectal Care Gap Outreach    1. Confirmed patient is between the ages of 50-75: YES     2. Confirmed that patient IS overdue or due soon for colorectal cancer screening: NO     3. Were orders placed within the last 12 months to complete screening: NO     Phone Number Called: no call, found in chart documentation  Call outcome: Patient has completed Colonoscopy and HMR Letter faxed to: Levi Rangel M.D.   Address   [facility], [facility], [address]  [address], [facility] [address] Phone:  [phone]    Fax:  [phone]       _____________________________________________________________________    Colon Cancer Screening Guidelines:     Important: If patient has any history of colon polyps or family history of colorectal cancer, FIT and Cologuard are NOT appropriate options. A colonoscopy is the recommended test for this set of patients.    • Colonoscopy  o Always recommend colonoscopy first.   o A colonoscopy is recommended over the other tests because it provides direct visualization of the colon and if there are small polyps these can also be removed with one procedure.  o If negative and no family history, could be cleared for 10 years.     • Cologuard/FIT  o Cologuard is completed once every 3 years.  o FIT is completed annually.  o If positive, Cologuard and FIT will require a diagnostic colonoscopy. Screening colonoscopies are classically covered by insurances, however, diagnostic colonoscopies may result in a bill.

## 2019-08-28 ENCOUNTER — TELEPHONE (OUTPATIENT)
Dept: MEDICAL GROUP | Facility: MEDICAL CENTER | Age: 74
End: 2019-08-28

## 2019-08-28 NOTE — TELEPHONE ENCOUNTER
Colorectal Care Gap Outreach    1. Confirmed patient is between the ages of 50-75: YES     2. Confirmed that patient IS overdue or due soon for colorectal cancer screening: YES     3. Were orders placed within the last 12 months to complete screening: NO     Phone Number Called: [phone] (home)     Call outcome: Patient has completed Colonoscopy and report faxed to [facility].        _____________________________________________________________________    Colon Cancer Screening Guidelines:     Important: If patient has any history of colon polyps or family history of colorectal cancer, FIT and Cologuard are NOT appropriate options. A colonoscopy is the recommended test for this set of patients.    • Colonoscopy  o Always recommend colonoscopy first.   o A colonoscopy is recommended over the other tests because it provides direct visualization of the colon and if there are small polyps these can also be removed with one procedure.  o If negative and no family history, could be cleared for 10 years.     • Cologuard/FIT  o Cologuard is completed once every 3 years.  o FIT is completed annually.  o If positive, Cologuard and FIT will require a diagnostic colonoscopy. Screening colonoscopies are classically covered by insurances, however, diagnostic colonoscopies may result in a bill.

## 2019-12-04 ENCOUNTER — OFFICE VISIT (OUTPATIENT)
Dept: URGENT CARE | Facility: PHYSICIAN GROUP | Age: 74
End: 2019-12-04
Payer: MEDICARE

## 2019-12-04 VITALS
DIASTOLIC BLOOD PRESSURE: 78 MMHG | HEIGHT: 66 IN | SYSTOLIC BLOOD PRESSURE: 118 MMHG | WEIGHT: 154 LBS | HEART RATE: 84 BPM | OXYGEN SATURATION: 91 % | TEMPERATURE: 98.2 F | BODY MASS INDEX: 24.75 KG/M2

## 2019-12-04 DIAGNOSIS — Z20.828 EXPOSURE TO THE FLU: ICD-10-CM

## 2019-12-04 DIAGNOSIS — J06.9 URI WITH COUGH AND CONGESTION: Primary | ICD-10-CM

## 2019-12-04 LAB
FLUAV+FLUBV AG SPEC QL IA: NEGATIVE
INT CON NEG: NORMAL
INT CON POS: NORMAL

## 2019-12-04 PROCEDURE — 99214 OFFICE O/P EST MOD 30 MIN: CPT | Performed by: PHYSICIAN ASSISTANT

## 2019-12-04 PROCEDURE — 87804 INFLUENZA ASSAY W/OPTIC: CPT | Performed by: PHYSICIAN ASSISTANT

## 2019-12-04 RX ORDER — BENZONATATE 100 MG/1
100 CAPSULE ORAL 3 TIMES DAILY PRN
Qty: 21 CAP | Refills: 0 | Status: SHIPPED | OUTPATIENT
Start: 2019-12-04 | End: 2019-12-11

## 2019-12-04 RX ORDER — OSELTAMIVIR PHOSPHATE 75 MG/1
75 CAPSULE ORAL 2 TIMES DAILY
Qty: 10 CAP | Refills: 0 | Status: SHIPPED | OUTPATIENT
Start: 2019-12-04 | End: 2019-12-09

## 2019-12-04 RX ORDER — METHYLPREDNISOLONE 4 MG/1
TABLET ORAL
Qty: 21 TAB | Refills: 0 | Status: SHIPPED
Start: 2019-12-04 | End: 2020-01-21

## 2019-12-04 NOTE — PROGRESS NOTES
Subjective:      Pt is a 74 y.o. male who presents with Cough (Congestion, SOB, bodyaches ongoing 1 day)            HPI  This is a new problem. Pt notes direct exposure to FLU in last 5 days. PT presents to  clinic today complaining of sore throat, fevers, chills, body aches, watery eyes, pressure in ears, cough, fatigue, runny nose. PT denies CP, SOB, NVD, abdominal pain, joint pain. PT states these symptoms began around 12-14 hours ago. PT states the pain is a 3/10, aching in nature and worse at night.  Pt has not taken any RX medications for this condition. The pt's medication list, problem list, and allergies have been evaluated and reviewed during today's visit.      PMH:  Negative per pt.      PSH:  Negative per pt.      Fam Hx:    family history includes Cancer in his father; Dementia in his mother, sister, and sister; Heart Disease in his brother; Hypertension in his brother; Lung Disease in his father.  Family Status   Relation Name Status   • Mo     • Fa     • Sis  Alive        in her 80s   • Bro  Alive   • Bro  Alive   • Sis     • Neg Hx  (Not Specified)       Soc HX:  Social History     Socioeconomic History   • Marital status:      Spouse name: Not on file   • Number of children: Not on file   • Years of education: Not on file   • Highest education level: Not on file   Occupational History   • Not on file   Social Needs   • Financial resource strain: Not on file   • Food insecurity:     Worry: Not on file     Inability: Not on file   • Transportation needs:     Medical: Not on file     Non-medical: Not on file   Tobacco Use   • Smoking status: Current Every Day Smoker     Packs/day: 1.00     Types: Cigarettes   • Smokeless tobacco: Never Used   Substance and Sexual Activity   • Alcohol use: No     Alcohol/week: 0.0 oz     Comment:  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   • Drug use: No   • Sexual activity: Not Currently   Lifestyle   • Physical activity:     Days per week: Not on  "file     Minutes per session: Not on file   • Stress: Not on file   Relationships   • Social connections:     Talks on phone: Not on file     Gets together: Not on file     Attends Hinduism service: Not on file     Active member of club or organization: Not on file     Attends meetings of clubs or organizations: Not on file     Relationship status: Not on file   • Intimate partner violence:     Fear of current or ex partner: Not on file     Emotionally abused: Not on file     Physically abused: Not on file     Forced sexual activity: Not on file   Other Topics Concern   • Not on file   Social History Narrative   • Not on file         Medications:  No current outpatient medications on file.      Allergies:  Patient has no known allergies.    ROS    Constitutional: Positive for chills, fevers, body aches and malaise/fatigue.   HENT: Positive for congestion and sore throat. Negative for ear pain.    Eyes: Negative for blurred vision, double vision and photophobia.   Respiratory: Positive for cough and sputum production. Negative for hemoptysis, shortness of breath and wheezing.    Cardiovascular: Negative for chest pain and palpitations.   Gastrointestinal: Negative for nausea, vomiting, abdominal pain, diarrhea and constipation.   Genitourinary: Negative for dysuria and flank pain.   Musculoskeletal: Negative for falls and myalgias.   Skin: Negative for itching and rash.   Neurological: Positive for headaches. Negative for dizziness and tingling.   Endo/Heme/Allergies: Does not bruise/bleed easily.   Psychiatric/Behavioral: Negative for depression. The patient is not nervous/anxious.           Objective:     /78   Pulse 84   Temp 36.8 °C (98.2 °F) (Temporal)   Ht 1.676 m (5' 6\")   Wt 69.9 kg (154 lb)   SpO2 91%   BMI 24.86 kg/m²      Physical Exam      Constitutional: PT is oriented to person, place, and time. PT appears well-developed and well-nourished. No distress.   HENT:   Head: Normocephalic and " atraumatic.   Right Ear: Hearing, tympanic membrane, external ear and ear canal normal.   Left Ear: Hearing, tympanic membrane, external ear and ear canal normal.   Nose: Mucosal edema, rhinorrhea and sinus tenderness present. Right sinus exhibits frontal sinus tenderness. Left sinus exhibits frontal sinus tenderness.   Mouth/Throat: Uvula is midline. Mucous membranes are pale. Posterior oropharyngeal edema and posterior oropharyngeal erythema with exudate noted on exam.   Eyes: Conjunctivae normal and EOM are normal. Pupils are equal, round, and reactive to light.   Neck: Normal range of motion. Neck supple. No thyromegaly present.   Cardiovascular: Normal rate, regular rhythm, normal heart sounds and intact distal pulses.  Exam reveals no gallop and no friction rub.    No murmur heard.  Pulmonary/Chest: Effort normal and breath sounds normal. No respiratory distress. PT has no wheezes. PT has no rales. PT exhibits no tenderness.   Abdominal: Soft. Bowel sounds are normal. PT exhibits no distension and no mass. There is no tenderness. There is no rebound and no guarding.   Musculoskeletal: Normal range of motion. PT exhibits no edema and no tenderness.   Lymphadenopathy:     PT has no cervical adenopathy.   Neurological: PT is alert and oriented to person, place, and time. PT displays normal reflexes. No cranial nerve deficit. PT exhibits normal muscle tone. Coordination normal.   Skin: Skin is warm and dry. No rash noted. No erythema.   Psychiatric: PT has a normal mood and affect. PT behavior is normal. Judgment and thought content normal.        Assessment/Plan:       1. URI with cough and congestion      POC flu-->NEG    2. Flu exposure    Tamiflu  Medrol pack  Tessalon  Rest, fluids encouraged.  OTC decongestant for congestion/cough  AVS with medical info given.  Pt was in full understanding and agreement with the plan.  Differential diagnosis, natural history, supportive care, and indications for immediate  follow-up discussed. All questions answered. Patient agrees with the plan of care.  Follow-up as needed if symptoms worsen or fail to improve to PCP, Urgent care or Emergency Room.

## 2019-12-09 ENCOUNTER — OFFICE VISIT (OUTPATIENT)
Dept: URGENT CARE | Facility: PHYSICIAN GROUP | Age: 74
End: 2019-12-09
Payer: MEDICARE

## 2019-12-09 ENCOUNTER — APPOINTMENT (OUTPATIENT)
Dept: RADIOLOGY | Facility: IMAGING CENTER | Age: 74
End: 2019-12-09
Attending: FAMILY MEDICINE
Payer: MEDICARE

## 2019-12-09 VITALS
HEIGHT: 66 IN | DIASTOLIC BLOOD PRESSURE: 62 MMHG | RESPIRATION RATE: 20 BRPM | OXYGEN SATURATION: 91 % | TEMPERATURE: 97.7 F | HEART RATE: 87 BPM | BODY MASS INDEX: 25.36 KG/M2 | WEIGHT: 157.8 LBS | SYSTOLIC BLOOD PRESSURE: 118 MMHG

## 2019-12-09 DIAGNOSIS — J44.9 CHRONIC OBSTRUCTIVE PULMONARY DISEASE, UNSPECIFIED COPD TYPE (HCC): ICD-10-CM

## 2019-12-09 PROCEDURE — 94640 AIRWAY INHALATION TREATMENT: CPT | Performed by: FAMILY MEDICINE

## 2019-12-09 PROCEDURE — 94760 N-INVAS EAR/PLS OXIMETRY 1: CPT | Performed by: FAMILY MEDICINE

## 2019-12-09 PROCEDURE — 99214 OFFICE O/P EST MOD 30 MIN: CPT | Mod: 25 | Performed by: FAMILY MEDICINE

## 2019-12-09 PROCEDURE — 71046 X-RAY EXAM CHEST 2 VIEWS: CPT | Mod: TC | Performed by: FAMILY MEDICINE

## 2019-12-09 RX ORDER — AZITHROMYCIN 250 MG/1
TABLET, FILM COATED ORAL
Qty: 6 TAB | Refills: 0 | Status: SHIPPED
Start: 2019-12-09 | End: 2020-01-21

## 2019-12-09 RX ORDER — ALBUTEROL SULFATE 90 UG/1
2 AEROSOL, METERED RESPIRATORY (INHALATION) EVERY 6 HOURS PRN
Qty: 1 INHALER | Refills: 0 | Status: SHIPPED | OUTPATIENT
Start: 2019-12-09 | End: 2020-09-24 | Stop reason: SDUPTHER

## 2019-12-09 RX ORDER — IPRATROPIUM BROMIDE AND ALBUTEROL SULFATE 2.5; .5 MG/3ML; MG/3ML
3 SOLUTION RESPIRATORY (INHALATION) ONCE
Status: COMPLETED | OUTPATIENT
Start: 2019-12-09 | End: 2019-12-09

## 2019-12-09 RX ORDER — PREDNISONE 10 MG/1
50 TABLET ORAL DAILY
Qty: 25 TAB | Refills: 0 | Status: SHIPPED | OUTPATIENT
Start: 2019-12-09 | End: 2019-12-14

## 2019-12-09 RX ADMIN — IPRATROPIUM BROMIDE AND ALBUTEROL SULFATE 3 ML: 2.5; .5 SOLUTION RESPIRATORY (INHALATION) at 09:01

## 2019-12-09 NOTE — PROGRESS NOTES
Chief Complaint   Patient presents with   • Shortness of Breath     difficulty sleeping, phlegm, cough, pt states that they are not feeling better, x5 days         Cough  This is a new problem. The current episode started 1 week ago.   He was seen in  on 12/4 and started on tamiflu for influenza.   Reports that sx are not improving.          The problem has been gradually worsening. The problem occurs constantly. The cough is productive of sputum. Associated symptoms include:   Sob, wheezing. Pertinent negatives include no fever,   headaches, sweats, weight loss.  Exertion aggravates the symptoms.  Patient has tried albuterol,  for the symptoms - no improvement.   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   Eyes: Negative for vision changes, d/c.    Respiratory: + for cough and sputum production.    Cardiovascular: Negative for chest pain and palpitations.   Gastrointestinal: Negative for nausea, vomiting, abdominal pain, diarrhea and constipation.   Genitourinary: Negative for dysuria, urgency and frequency.   Skin: Negative for rash or  itching.   Neurological: Negative for dizziness and tingling.    Psychiatric/Behavioral: Negative for depression.   Hematologic/lymphatic - denies bruising or excessive bleeding  All other systems reviewed and are negative.          "Objective:     /62 (BP Location: Right arm, Patient Position: Sitting, BP Cuff Size: Adult)   Pulse 87   Temp 36.5 °C (97.7 °F) (Temporal)   Resp 20   Ht 1.676 m (5' 6\")   Wt 71.6 kg (157 lb 12.8 oz)   SpO2 93%       Physical Exam   Constitutional: patient is oriented to person, place, and time. Patient appears well-developed and well-nourished. No distress.   HENT:   Head: Normocephalic and atraumatic.   Right Ear: External ear normal.   Left Ear: External ear normal.   Nose: Mucosal edema  present. Right sinus exhibits no maxillary sinus tenderness. Left sinus exhibits no maxillary sinus tenderness.   Mouth/Throat: Mucous membranes are normal. No oral lesions. No oropharyngeal exudate or posterior oropharyngeal edema.   Eyes: Conjunctivae and EOM are normal. Pupils are equal, round, and reactive to light. Right eye exhibits no discharge. Left eye exhibits no discharge. No scleral icterus.   Neck: Normal range of motion. Neck supple. No tracheal deviation present.   Cardiovascular: Normal rate, regular rhythm and normal heart sounds.  Exam reveals no friction rub.    Pulmonary/Chest: Effort normal. No respiratory distress.  Patient has rhonchi and wheezing. Patient has no rales.    Musculoskeletal:  exhibits no edema.   Lymphadenopathy:    no cervical LAD  Neurological: patient is alert and oriented to person, place, and time.   Skin: Skin is warm and dry. No rash noted. No erythema.   Psychiatric: patient  has a normal mood and affect.  behavior is normal.   Nursing note and vitals reviewed.       Details     Reading Physician Reading Date Result Priority   Markie Angluo M.D. 12/9/2019 Urgent [facility]      Narrative & Impression        12/9/2019 8:41 AM     HISTORY/REASON FOR EXAM:  COUGH; Cough.        TECHNIQUE/EXAM DESCRIPTION AND NUMBER OF VIEWS:  Two views of the chest.     COMPARISON:  None.     FINDINGS:  The heart is normal in size.  Mild interstitial opacities.  Elevated left hemidiaphragm.  No " pleural effusion.     IMPRESSION:     Mild diffuse atelectasis with mild edema or pneumonitis not excluded.            Assessment/Plan:       1. Chronic obstructive pulmonary disease, unspecified COPD type (HCC)   Subjective improvement after duoneb and wheezing decreased.       Chest x-ray was personally interpreted and reviewed.  bilat opacities as noted above.    Cardiac silhouette is normal. No hemidiaphragm elevation. No bony abnormalities.       - REFERRAL TO PULMONOLOGY     - predniSONE (DELTASONE) 10 MG Tab; Take 5 Tabs by mouth every day for 5 days.  Dispense: 25 Tab; Refill: 0  - azithromycin (ZITHROMAX) 250 MG Tab; Take as directed  Dispense: 6 Tab; Refill: 0     - albuterol 108 (90 Base) MCG/ACT Aero Soln inhalation aerosol; Inhale 2 Puffs by mouth every 6 hours as needed for Shortness of Breath.  Dispense: 1 Inhaler; Refill: 0       Supportive care, differential diagnoses, and indications for immediate follow-up discussed with patient.   Pathogenesis of diagnosis discussed including typical length and natural progression.   Instructed to return to clinic or nearest emergency department for any change in condition, further concerns, or worsening of symptoms.  Patient states understanding of the plan of care and discharge instructions.

## 2020-01-21 ENCOUNTER — HOSPITAL ENCOUNTER (OUTPATIENT)
Dept: LAB | Facility: MEDICAL CENTER | Age: 75
End: 2020-01-21
Attending: PHYSICIAN ASSISTANT
Payer: MEDICARE

## 2020-01-21 ENCOUNTER — OFFICE VISIT (OUTPATIENT)
Dept: MEDICAL GROUP | Facility: MEDICAL CENTER | Age: 75
End: 2020-01-21
Payer: MEDICARE

## 2020-01-21 VITALS
TEMPERATURE: 98.1 F | SYSTOLIC BLOOD PRESSURE: 124 MMHG | DIASTOLIC BLOOD PRESSURE: 66 MMHG | HEIGHT: 66 IN | RESPIRATION RATE: 16 BRPM | HEART RATE: 78 BPM | OXYGEN SATURATION: 94 % | BODY MASS INDEX: 24.75 KG/M2 | WEIGHT: 154 LBS

## 2020-01-21 DIAGNOSIS — E55.9 VITAMIN D DEFICIENCY: ICD-10-CM

## 2020-01-21 DIAGNOSIS — Z23 NEED FOR 23-POLYVALENT PNEUMOCOCCAL POLYSACCHARIDE VACCINE: ICD-10-CM

## 2020-01-21 DIAGNOSIS — F17.200 TOBACCO DEPENDENCE: ICD-10-CM

## 2020-01-21 DIAGNOSIS — Z00.00 MEDICARE ANNUAL WELLNESS VISIT, INITIAL: ICD-10-CM

## 2020-01-21 DIAGNOSIS — Z11.59 ENCOUNTER FOR HEPATITIS C SCREENING TEST FOR LOW RISK PATIENT: ICD-10-CM

## 2020-01-21 DIAGNOSIS — L20.82 FLEXURAL ECZEMA: ICD-10-CM

## 2020-01-21 DIAGNOSIS — Z12.5 SCREENING PSA (PROSTATE SPECIFIC ANTIGEN): ICD-10-CM

## 2020-01-21 DIAGNOSIS — J43.9 PULMONARY EMPHYSEMA, UNSPECIFIED EMPHYSEMA TYPE (HCC): ICD-10-CM

## 2020-01-21 PROBLEM — L98.491 SKIN ULCER, LIMITED TO BREAKDOWN OF SKIN (HCC): Status: RESOLVED | Noted: 2019-01-15 | Resolved: 2020-01-21

## 2020-01-21 LAB
HCV AB SER QL: NEGATIVE
PSA SERPL-MCNC: 0.09 NG/ML (ref 0–4)

## 2020-01-21 PROCEDURE — 84153 ASSAY OF PSA TOTAL: CPT

## 2020-01-21 PROCEDURE — G0009 ADMIN PNEUMOCOCCAL VACCINE: HCPCS | Performed by: PHYSICIAN ASSISTANT

## 2020-01-21 PROCEDURE — 36415 COLL VENOUS BLD VENIPUNCTURE: CPT

## 2020-01-21 PROCEDURE — 90732 PPSV23 VACC 2 YRS+ SUBQ/IM: CPT | Performed by: PHYSICIAN ASSISTANT

## 2020-01-21 PROCEDURE — 86803 HEPATITIS C AB TEST: CPT

## 2020-01-21 PROCEDURE — 8041 PR SCP AHA: Performed by: PHYSICIAN ASSISTANT

## 2020-01-21 PROCEDURE — G0439 PPPS, SUBSEQ VISIT: HCPCS | Performed by: PHYSICIAN ASSISTANT

## 2020-01-21 RX ORDER — VARENICLINE TARTRATE 1 MG/1
1 TABLET, FILM COATED ORAL 2 TIMES DAILY
Qty: 60 TAB | Refills: 3 | Status: SHIPPED | OUTPATIENT
Start: 2020-01-21 | End: 2020-09-24

## 2020-01-21 ASSESSMENT — ANXIETY QUESTIONNAIRES
4. TROUBLE RELAXING: NOT AT ALL
7. FEELING AFRAID AS IF SOMETHING AWFUL MIGHT HAPPEN: NOT AT ALL
2. NOT BEING ABLE TO STOP OR CONTROL WORRYING: NOT AT ALL
6. BECOMING EASILY ANNOYED OR IRRITABLE: NOT AT ALL
5. BEING SO RESTLESS THAT IT IS HARD TO SIT STILL: NOT AT ALL
GAD7 TOTAL SCORE: 0
1. FEELING NERVOUS, ANXIOUS, OR ON EDGE: NOT AT ALL
3. WORRYING TOO MUCH ABOUT DIFFERENT THINGS: NOT AT ALL

## 2020-01-21 ASSESSMENT — ACTIVITIES OF DAILY LIVING (ADL): BATHING_REQUIRES_ASSISTANCE: 0

## 2020-01-21 ASSESSMENT — PATIENT HEALTH QUESTIONNAIRE - PHQ9: CLINICAL INTERPRETATION OF PHQ2 SCORE: 0

## 2020-01-21 ASSESSMENT — ENCOUNTER SYMPTOMS: GENERAL WELL-BEING: FAIR

## 2020-01-21 NOTE — PROGRESS NOTES
Chief Complaint   Patient presents with   • Annual Exam         HPI:  This is a 74-year-old male here today for his annual wellness examination.  Had 2 episodes of exacerbation of emphysema.  He was treated successfully on the second visit to the urgent care.  Feels stable today.  No shortness of breath or chest pain.  Knows he needs to stop smoking.  Has smoked a pack a day for at least 50 and possibly as much as 60 years.  Would like to stop smoking.  In the past was on Chantix.  Medication was effective but he continued to smoke.  Would like a prescription for that.  He also has a history of eczema but that is well controlled.  Also vitamin D deficiency but does not take an over-the-counter supplement.  No recent labs looking at PSA as well as no recent hep C viral antibody ordered.  He is interested in obtaining his second pneumococcal vaccination.  Received both Shingrix vaccinations.  No side effects.  States that the previous ulcer on the foot has improved.  No concern with any pain of that toe.    Patient Active Problem List    Diagnosis Date Noted   • Medicare annual wellness visit, initial 03/19/2018   • Flexural eczema 07/13/2017   • Tobacco dependence 07/13/2017   • Vitamin D deficiency 07/13/2017   • Pulmonary emphysema (HCC) 07/13/2017       Current Outpatient Medications   Medication Sig Dispense Refill   • varenicline (CHANTIX SHARON) 0.5 MG X 11 & 1 MG X 42 tablet As directed for starter sharon 56 Tab 3   • varenicline (CHANTIX) 1 MG tablet Take 1 Tab by mouth 2 times a day. Take after starter sharon. 60 Tab 3   • albuterol 108 (90 Base) MCG/ACT Aero Soln inhalation aerosol Inhale 2 Puffs by mouth every 6 hours as needed for Shortness of Breath. 1 Inhaler 0     No current facility-administered medications for this visit.             Current supplements as per medication list.       Allergies: Patient has no known allergies.    Current social contact/activities: Yes    He  reports that he has been smoking  cigarettes. He has a 55.00 pack-year smoking history. He has never used smokeless tobacco. He reports that he does not drink alcohol or use drugs.  Ready to quit: Yes  Counseling given: Yes      DPA/Advanced Directive:  Patient does not have an Advanced Directive.  A packet and workshop information was given on Advanced Directives.    ROS:    Gait: Uses no assistive device  Ostomy: No  Other tubes: No  Amputations: No  Chronic oxygen use: No  Last eye exam: Nothing recent  Wears hearing aids: No   : Denies any urinary leakage during the last 6 months    Screening:    Depression Screening    Little interest or pleasure in doing things?  0 - not at all  Feeling down, depressed , or hopeless? 0 - not at all  Patient Health Questionnaire Score: 0     If depressive symptoms identified deferred to follow up visit unless specifically addressed in assessment and plan.    Interpretation of PHQ-9 Total Score   Score Severity   1-4 No Depression   5-9 Mild Depression   10-14 Moderate Depression   15-19 Moderately Severe Depression   20-27 Severe Depression    Screening for Cognitive Impairment    Three Minute Recall (village, kitchen, baby) 3/3    Pablo clock face with all 12 numbers and set the hands to show 10 past 10.  Yes    Cognitive concerns identified deferred for follow up unless specifically addressed in assessment and plan.    Fall Risk Assessment    Has the patient had two or more falls in the last year or any fall with injury in the last year?  No    Safety Assessment    Throw rugs on floor.  Yes  Handrails on all stairs.  No  Good lighting in all hallways.  Yes  Difficulty hearing.  No  Patient counseled about all safety risks that were identified.    Functional Assessment ADLs    Are there any barriers preventing you from cooking for yourself or meeting nutritional needs?  No.    Are there any barriers preventing you from driving safely or obtaining transportation?  No.    Are there any barriers preventing you  from using a telephone or calling for help?  No.    Are there any barriers preventing you from shopping?  No.    Are there any barriers preventing you from taking care of your own finances?  No.    Are there any barriers preventing you from managing your medications?  No.    Are there any barriers preventing you from showering, bathing or dressing yourself?  No.    Are you currently engaging in any exercise or physical activity?  No.     What is your perception of your health?  Fair.      Health Maintenance Summary                HEPATITIS C SCREENING Overdue 1945     ANNUAL PFT SCREENING-FEV1 AND FEV/FVC RATIO / SPIROMETRY Overdue 10/18/1965     IMM PNEUMOCOCCAL VACCINE: 65+ Years Overdue 1/15/2020      Done 1/15/2019 Imm Admin: Pneumococcal Conjugate Vaccine (Prevnar/PCV-13)    Annual Wellness Visit Overdue 1/16/2020      Done 1/15/2019 Visit Dx: Medicare annual wellness visit, initial     Patient has more history with this topic...    COLONOSCOPY Next Due 11/19/2022      Done 11/19/2012 REFERRAL TO GI FOR COLONOSCOPY     Patient has more history with this topic...    IMM DTaP/Tdap/Td Vaccine Next Due 1/15/2029      Done 1/15/2019 Imm Admin: Tdap Vaccine     Patient has more history with this topic...          Patient Care Team:  Nate Burgess P.A.-C. 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   History reviewed. No pertinent surgical history.    Exam:   /66 (BP Location: Right arm, Patient Position: Sitting, BP Cuff Size: Adult)   Pulse 78   Temp 36.7 °C (98.1 °F) (Temporal)   Resp 16   Ht 1.676 m (5' 6\")   Wt 69.9 kg (154 lb)   SpO2 94%  Body mass index is 24.86 kg/m².    Hearing good.    Dentition fair  Alert, oriented in no acute distress.  Eye contact is good, speech goal directed, affect calm  Heart regular rate and rhythm, no murmurs  Lungs clear to auscultation.  No wheezes rales or rhonchi.    Assessment and Plan. The following treatment and monitoring plan is recommended:    1. Tobacco dependence  Subsequent Annual Wellness Visit - Includes PPPS ()    varenicline (CHANTIX SHARON) 0.5 MG X 11 & 1 MG X 42 tablet    varenicline (CHANTIX) 1 MG tablet    REFERRAL TO LUNG CANCER SCREENING PROGRAM   2. Medicare annual wellness visit, initial  Subsequent Annual Wellness Visit - Includes PPPS ()   3. Vitamin D deficiency  Subsequent Annual Wellness Visit - Includes PPPS ()   4. Pulmonary emphysema, unspecified emphysema type (HCC)  Subsequent Annual Wellness Visit - Includes PPPS ()   5. Flexural eczema  Subsequent Annual Wellness Visit - Includes PPPS ()   6. Encounter for hepatitis C screening test for low risk patient  HEP C VIRUS ANTIBODY   7. Screening PSA (prostate specific antigen)  PROSTATE SPECIFIC AG SCREENING   8. Need for 23-polyvalent pneumococcal polysaccharide vaccine  PneumoVax PPV23 =>3yo     Would like to see him in 3 months in follow-up.  Also provided Chantix with the starter pack and maintenance dose.  Sent that to his pharmacy.  Offered referral to tobacco cessation program but he declined.  Also referred him to lung cancer screening for evaluation.  It appears that he may meet the criteria.    Services suggested: No services needed at this time  Health Care Screening: Age-appropriate preventive services recommended by USPTF and ACIP covered " by Medicare were discussed today. Services ordered if indicated and agreed upon by the patient.  Referrals offered: Community-based lifestyle interventions to reduce health risks and promote self-management and wellness, fall prevention, nutrition, physical activity, tobacco-use cessation, weight loss, and mental health services as per orders if indicated.    Discussion today about general wellness and lifestyle habits:    · Prevent falls and reduce trip hazards; Cautioned about securing or removing rugs.  · Have a working fire alarm and carbon monoxide detector;   · Engage in regular physical activity and social activities     Follow-up: Return in about 6 months (around 7/21/2020), or if symptoms worsen or fail to improve.

## 2020-01-21 NOTE — NON-PROVIDER
Depression Screening    Little interest or pleasure in doing things?  0 - not at all  Feeling down, depressed , or hopeless? 0 - not at all  Patient Health Questionnaire Score: 0     If depressive symptoms identified deferred to follow up visit unless specifically addressed in assessment and plan.    Interpretation of PHQ-9 Total Score   Score Severity   1-4 No Depression   5-9 Mild Depression   10-14 Moderate Depression   15-19 Moderately Severe Depression   20-27 Severe Depression    Screening for Cognitive Impairment    Three Minute Recall (village, kitchen, baby) 3/3    Pablo clock face with all 12 numbers and set the hands to show 10 past 10.  Yes    Cognitive concerns identified deferred for follow up unless specifically addressed in assessment and plan.    Fall Risk Assessment    Has the patient had two or more falls in the last year or any fall with injury in the last year?  No    Safety Assessment    Throw rugs on floor.  Yes  Handrails on all stairs.  No  Good lighting in all hallways.  Yes  Difficulty hearing.  No  Patient counseled about all safety risks that were identified.    Functional Assessment ADLs    Are there any barriers preventing you from cooking for yourself or meeting nutritional needs?  No.    Are there any barriers preventing you from driving safely or obtaining transportation?  No.    Are there any barriers preventing you from using a telephone or calling for help?  No.    Are there any barriers preventing you from shopping?  No.    Are there any barriers preventing you from taking care of your own finances?  No.    Are there any barriers preventing you from managing your medications?  No.    Are there any barriers preventing you from showering, bathing or dressing yourself?  No.    Are you currently engaging in any exercise or physical activity?  No.     What is your perception of your health?  Fair.      Health Maintenance Summary                HEPATITIS C SCREENING Overdue 1945      ANNUAL PFT SCREENING-FEV1 AND FEV/FVC RATIO / SPIROMETRY Overdue 10/18/1965     IMM PNEUMOCOCCAL VACCINE: 65+ Years Overdue 1/15/2020      Done 1/15/2019 Imm Admin: Pneumococcal Conjugate Vaccine (Prevnar/PCV-13)    Annual Wellness Visit Overdue 1/16/2020      Done 1/15/2019 Visit Dx: Medicare annual wellness visit, initial     Patient has more history with this topic...    COLONOSCOPY Next Due 11/19/2022      Done 11/19/2012 REFERRAL TO GI FOR COLONOSCOPY     Patient has more history with this topic...    IMM DTaP/Tdap/Td Vaccine Next Due 1/15/2029      Done 1/15/2019 Imm Admin: Tdap Vaccine     Patient has more history with this topic...          Patient Care Team:  Nate Burgess P.A.-C. as PCP - General (Family Medicine)

## 2020-01-21 NOTE — ASSESSMENT & PLAN NOTE
This is a 74-year-old male here today for his annual wellness examination.  Had 2 episodes of exacerbation of emphysema.  He was treated successfully on the second visit to the urgent care.  Feels stable today.  No shortness of breath or chest pain.  Knows he needs to stop smoking.  Has smoked a pack a day for at least 50 and possibly as much as 60 years.  Would like to stop smoking.  In the past was on Chantix.  Medication was effective but he continued to smoke.  Would like a prescription for that.  He also has a history of eczema but that is well controlled.  Also vitamin D deficiency but does not take an over-the-counter supplement.  No recent labs looking at PSA as well as no recent hep C viral antibody ordered.  He is interested in obtaining his second pneumococcal vaccination.  Received both Shingrix vaccinations.  No side effects.  States that the previous ulcer on the foot has improved.  No concern with any pain of that toe.

## 2020-01-30 ENCOUNTER — TELEPHONE (OUTPATIENT)
Dept: HEMATOLOGY ONCOLOGY | Facility: MEDICAL CENTER | Age: 75
End: 2020-01-30

## 2020-01-30 NOTE — TELEPHONE ENCOUNTER
Received referral to lung cancer screening program.  Chart review to assess for lung cancer screening program eligibility.   1. Age 55-77 yrs of age? Yes 74 y.o.  2. 30 pack year hx of smoking, or greater? Yes 1 [id number]= [id number] hx  3. Current smoker or if quit, has pt quit within last 15 yrs?Yes  Current smoker  4. Any signs or symptoms of lung cancer? None noted  5. Previous history of lung cancer? None noted  6. Chest CT within past 12 mos.? None noted  Patient does meet eligibility criteria. LCSP scheduling notified to schedule the shared decision making visit.

## 2020-02-03 ENCOUNTER — TELEPHONE (OUTPATIENT)
Dept: HEMATOLOGY ONCOLOGY | Facility: MEDICAL CENTER | Age: 75
End: 2020-02-03

## 2020-02-03 NOTE — TELEPHONE ENCOUNTER
Phone Number Called: [phone]    Call outcome: Did not leave a detailed message. Requested patient to call back.    Message: 1st Attempt  Left message for patient to return call to schedule LCSP appointment.    LCSP/SCP/ Dx:Tobacco dependence Ref: Nate Burgess P.A.-C.

## 2020-02-12 ENCOUNTER — OFFICE VISIT (OUTPATIENT)
Dept: HEMATOLOGY ONCOLOGY | Facility: MEDICAL CENTER | Age: 75
End: 2020-02-12
Payer: MEDICARE

## 2020-02-12 VITALS
HEIGHT: 64 IN | RESPIRATION RATE: 18 BRPM | BODY MASS INDEX: 26.27 KG/M2 | SYSTOLIC BLOOD PRESSURE: 112 MMHG | TEMPERATURE: 99.1 F | WEIGHT: 153.88 LBS | OXYGEN SATURATION: 94 % | HEART RATE: 82 BPM | DIASTOLIC BLOOD PRESSURE: 62 MMHG

## 2020-02-12 DIAGNOSIS — F17.210 CIGARETTE SMOKER: ICD-10-CM

## 2020-02-12 PROCEDURE — G0296 VISIT TO DETERM LDCT ELIG: HCPCS | Performed by: NURSE PRACTITIONER

## 2020-02-12 ASSESSMENT — ENCOUNTER SYMPTOMS
WHEEZING: 1
COUGH: 1
SHORTNESS OF BREATH: 0
SPUTUM PRODUCTION: 1
WEIGHT LOSS: 0
HEMOPTYSIS: 0

## 2020-02-12 ASSESSMENT — PAIN SCALES - GENERAL: PAINLEVEL: NO PAIN

## 2020-02-12 NOTE — PROGRESS NOTES
"Subjective:      Jaskaran Le is a 74 y.o. male who presents for Lung Cancer Screening Program Prescreen (Tobacco dependence Ref: Nate Burgess, P.A.-C) for lung cancer screening shared decision making visit.         HPI   Patient seen today for initial lung cancer screening visit. Patient referred by PCP, Nate Burgess PA-C.     The patient meets eligibility criteria including age, smoking history (30+ pack years), if former smoker, quit in the last 15 years, and absence of signs or symptoms of lung cancer.    - Age - 74  - Smoking history - Patient has smoked for 60 years at an average of 1 ppd = 60 pack year smoking history.  - Current smoking status - current smoker, started Chantix 3 weeks ago  - No symptoms of lung cancer and no previous history of lung cancer        No Known Allergies        Current Outpatient Medications on File Prior to Visit   Medication Sig Dispense Refill   • varenicline (CHANTIX SHARON) 0.5 MG X 11 & 1 MG X 42 tablet As directed for starter sharon 56 Tab 3   • varenicline (CHANTIX) 1 MG tablet Take 1 Tab by mouth 2 times a day. Take after starter sharon. 60 Tab 3   • albuterol 108 (90 Base) MCG/ACT Aero Soln inhalation aerosol Inhale 2 Puffs by mouth every 6 hours as needed for Shortness of Breath. 1 Inhaler 0     No current facility-administered medications on file prior to visit.          Review of Systems   Constitutional: Negative for malaise/fatigue and weight loss.   Respiratory: Positive for cough, sputum production (clear) and wheezing (clears with a good cough, inhaler PRN). Negative for hemoptysis and shortness of breath.         S/p URI          Objective:     /62 (BP Location: Right arm, Patient Position: Sitting, BP Cuff Size: Adult)   Pulse 82   Temp 37.3 °C (99.1 °F) (Temporal)   Resp 18   Ht 1.63 m (5' 4.17\")   Wt 69.8 kg (153 lb 14.1 oz)   SpO2 94%   BMI 26.27 kg/m²      Physical Exam  Vitals signs reviewed.   Constitutional:       General: He is not " in acute distress.     Appearance: He is well-developed. He is not diaphoretic.   Cardiovascular:      Rate and Rhythm: Normal rate and regular rhythm.      Heart sounds: Normal heart sounds. No murmur. No friction rub. No gallop.    Pulmonary:      Effort: Pulmonary effort is normal. No respiratory distress.      Breath sounds: Normal breath sounds. No wheezing.   Musculoskeletal: Normal range of motion.   Skin:     General: Skin is warm and dry.   Neurological:      Mental Status: He is alert and oriented to person, place, and time.            Assessment/Plan:       1. Cigarette smoker  CT-LUNG CANCER-SCREENING       We conducted a shared decision-making process using a decision aid. We reviewed benefits and harms of screening, including false positives and potential need for additional diagnostic testing, the possibility of over diagnosis, and total radiation exposure.    We discussed the importance of adhering to annual LDCT screening. We also discussed the impact of comorbities on the patient's the ability or willingness to undergo diagnostic procedure(s) and treatment.    Counseling on the importance of maintaining cigarette smoking abstinence if former smoker; or the importance of smoking cessation if current smoker and, if appropriate, furnishing of information about tobacco cessation interventions. I provided patient with smoking cessation materials and resources within [facility] and the community. Patient appreciative of the resources.     Based on our discussion, we have decided to begin annual lung cancer screening starting now.

## 2020-03-02 ENCOUNTER — HOSPITAL ENCOUNTER (OUTPATIENT)
Dept: RADIOLOGY | Facility: MEDICAL CENTER | Age: 75
End: 2020-03-02
Attending: NURSE PRACTITIONER
Payer: MEDICARE

## 2020-03-02 DIAGNOSIS — F17.210 CIGARETTE SMOKER: ICD-10-CM

## 2020-03-02 PROCEDURE — G0297 LDCT FOR LUNG CA SCREEN: HCPCS

## 2020-03-05 ENCOUNTER — TELEPHONE (OUTPATIENT)
Dept: HEMATOLOGY ONCOLOGY | Facility: MEDICAL CENTER | Age: 75
End: 2020-03-05

## 2020-03-05 DIAGNOSIS — R91.8 ABNORMAL CT LUNG SCREENING: ICD-10-CM

## 2020-03-05 NOTE — LETTER
. [address]  [place] [address]  P [phone]  F [phone]         Date: March 5, 2020    Jaskaran Po Quintanacurtaliceselina  [address]   [place] [address]    Re:  Low-dose chest CT performed on 03/02/2020     Medical Record Number: [id number]    Dear Jaskaran,    We are writing to let you know that the results of your recent low-dose chest CT (LDCT) examination shows one or more lung nodule(s) which are likely benign (not cancer).  Lung nodules are very common and many people without cancer have these nodules.  To make sure these nodule(s) are benign, and remain unchanged, your radiologist recommends you have another low-dose chest CT on or around March 2021. In the event that any additional “incidental” findings were identified from this exam, we have communicated back to your primary care provider for follow-up.    Here are some other important points you should know:  • Your low-dose Chest CT report has been sent to your referring or primary health care provider and is available to participants in [employer].  As a part of our Lung Cancer Screening program we will remind you and your referring health care provider when your next LDCT screening is due.  • Although low-dose chest CT is very effective at finding lung cancer early, it cannot find all lung cancers. If you develop any new symptoms such as shortness of breath, chest pain, or coughing up blood, please call your doctor.  • Please keep in mind that good health involves quitting smoking (for help, call [facility] Quit Tobacco program at [phone]), an annual physical exam, and continued screening with low-dose chest CT.    Thank you for participating in the Lung Cancer Screening program. If you have any questions about this letter or our program, please call our Nurse at [phone].    Sincerely,  Geneva Clayton MD, [facility]  Medical Director [facility] Lung Cancer Screening Program

## 2020-03-05 NOTE — TELEPHONE ENCOUNTER
Phoned patient with results of LDCT exam performed 3/2/20.  Notified him that the results showed ground glass area in the RLL. We recommend a follow-up low-dose chest CT in 3 months to monitor for changes.    Patient also updated regarding incidental finding at left upper quadrant measuring 10 x 8 cm which may represent a heavily calcified exophytic renal lesion or an adrenal lesion.    Findings were discussed with Intake Oncology Coordinator who agrees to see the patient for further evaluation.    Patient agrees to all recommendations. Referring provider will be notified of results and incidental findings per this communication.  Health maintenance to be updated and patient will be sent lung cancer screening result letter.          Addendum:     Lung RADS: 2 - Benign Appearance or Behavior  Nodules with a very low likelihood of becoming a clinically active cancer due to size or lack of growth     Findings: solid nodule(s): less than 6 mm or new less than 4 mm  part solid nodule(s): less than 6 mm total diameter on baseline screening  non solid nodule(s) (GGN): less than 30 mm OR greater than or equal to 30 mm and unchanged or slowly growing  category 3 or 4 nodules unchanged for greater than or equal to 3 months  perifissural nodule(s) less than 10 mm     Management: Continue annual screening with LDCT in 12 months   Signed by Cheryl Skinner M.D. on 3/5/2020  9:09 AM      Narrative & Impression      3/2/2020 3:46 PM     HISTORY/REASON FOR EXAM:  lung cancer screening; 60 pack-year smoking history      TECHNIQUE/EXAM DESCRIPTION AND NUMBER OF VIEWS:  Lung cancer screening without contrast.     Low dose noncontrast helical images were obtained of the chest from the lung apices through the costophrenic sulci utilizing thin collimation and intervals with reconstructed images sent to PACS in axial, coronal and sagittal planes.     Low dose optimization technique was utilized for this CT exam including automated  exposure control and adjustment of the mA and/or kV according to patient size.     COMPARISON: Plain film 12/9/2019.     FINDINGS:  Atherosclerotic plaque is seen in the aorta. Ascending aorta measures 3.8 cm in diameter. There is coronary artery calcification. There is trace pericardial fluid. No pleural effusion is seen. There are small mediastinal lymph nodes. Evaluation of the   missael is limited by the lack of intravenous contrast. There are small axillary lymph nodes.     There are emphysematous changes. No focal consolidation or pneumothorax is seen. There is patchy opacity in the peripheral right middle lobe which could be infectious or inflammatory. There is mild right basilar atelectasis. There is a focal groundglass   opacity in the right lower lobe measuring 1.2 x 1.3 cm. There is mild left basilar atelectasis. There is secretions within bilateral mainstem bronchi. There is mild lingular atelectasis. There is elevation of the left hemidiaphragm.     Limited views were obtained of the upper abdomen. There is a 1.4 cm right adrenal nodule compatible with an adrenal adenoma. There is a lesion with coarse peripheral calcifications in the left upper quadrant measuring 10 x 8 cm which may represent a   heavily calcified exophytic renal lesion or an adrenal lesion. Degenerative changes are seen in the spine.     IMPRESSION:  Ill-defined opacity in the peripheral right middle lobe may be infectious/inflammatory. Follow-up CT chest in 3 months is recommended.     Groundglass nodular opacity in the right lower lobe measuring 1.3 x 1.2 cm for which follow-up CT chest in 3 months is recommended.     Emphysematous changes.     Atherosclerotic plaque including coronary artery calcification.     10 x 8 cm lesion in the left upper quadrant with coarse peripheral calcifications may be an exophytic renal lesion or an adrenal lesion. Further evaluation with  dedicated imaging of the abdomen with contrast is recommended.

## 2020-03-06 PROBLEM — R91.8 GROUND GLASS OPACITY PRESENT ON IMAGING OF LUNG: Status: ACTIVE | Noted: 2020-03-06

## 2020-03-09 ENCOUNTER — OFFICE VISIT (OUTPATIENT)
Dept: HEMATOLOGY ONCOLOGY | Facility: MEDICAL CENTER | Age: 75
End: 2020-03-09
Payer: MEDICARE

## 2020-03-09 ENCOUNTER — TELEPHONE (OUTPATIENT)
Dept: HEMATOLOGY ONCOLOGY | Facility: MEDICAL CENTER | Age: 75
End: 2020-03-09

## 2020-03-09 ENCOUNTER — HOSPITAL ENCOUNTER (OUTPATIENT)
Dept: LAB | Facility: MEDICAL CENTER | Age: 75
End: 2020-03-09
Attending: NURSE PRACTITIONER
Payer: MEDICARE

## 2020-03-09 VITALS
SYSTOLIC BLOOD PRESSURE: 126 MMHG | OXYGEN SATURATION: 97 % | HEART RATE: 68 BPM | RESPIRATION RATE: 16 BRPM | WEIGHT: 158.07 LBS | HEIGHT: 64 IN | DIASTOLIC BLOOD PRESSURE: 68 MMHG | TEMPERATURE: 98.1 F | BODY MASS INDEX: 26.99 KG/M2

## 2020-03-09 DIAGNOSIS — Z01.812 PRE-PROCEDURE LAB EXAM: ICD-10-CM

## 2020-03-09 DIAGNOSIS — R91.8 GROUND GLASS OPACITY PRESENT ON IMAGING OF LUNG: ICD-10-CM

## 2020-03-09 DIAGNOSIS — R19.02 ABDOMINAL MASS, LEFT UPPER QUADRANT: ICD-10-CM

## 2020-03-09 LAB
BUN SERPL-MCNC: 15 MG/DL (ref 8–22)
CREAT SERPL-MCNC: 0.69 MG/DL (ref 0.5–1.4)

## 2020-03-09 PROCEDURE — 84520 ASSAY OF UREA NITROGEN: CPT

## 2020-03-09 PROCEDURE — 99214 OFFICE O/P EST MOD 30 MIN: CPT | Performed by: NURSE PRACTITIONER

## 2020-03-09 PROCEDURE — 36415 COLL VENOUS BLD VENIPUNCTURE: CPT

## 2020-03-09 PROCEDURE — 82565 ASSAY OF CREATININE: CPT

## 2020-03-09 ASSESSMENT — ENCOUNTER SYMPTOMS
NERVOUS/ANXIOUS: 0
FEVER: 0
SPUTUM PRODUCTION: 1
COUGH: 1
SORE THROAT: 0
DIZZINESS: 0
VOMITING: 0
PALPITATIONS: 0
DIAPHORESIS: 0
HEADACHES: 0
SHORTNESS OF BREATH: 0
DEPRESSION: 0
WEIGHT LOSS: 0
CHILLS: 0
CONSTIPATION: 0
INSOMNIA: 0
WHEEZING: 0
DIARRHEA: 0
TINGLING: 0
NAUSEA: 0
ABDOMINAL PAIN: 0
HEMOPTYSIS: 0

## 2020-03-09 ASSESSMENT — PAIN SCALES - GENERAL: PAINLEVEL: NO PAIN

## 2020-03-09 NOTE — PROGRESS NOTES
Subjective:      Jaskaran Le is a 74 y.o. male who presents as a New Patient for an abnormal CT chest.         HPI    Patient referred to me, Intake Oncology Coordinator by JESSICA Harris from lung cancer screening program for abnormal CT chest.  Patient is unaccompanied for today's visit.    Patient was recently seen to initiate lung cancer screening.  He underwent his first lung cancer screening CT on March 5, 2020.  There is an ill-defined opacity in the peripheral right middle lobe which may be infectious or inflammatory.  There is also groundglass nodular opacity in the right lower lobe measuring 1.3 x 1.2 cm in size.  Emphysema changes were noted.  He does have atherosclerotic plaque.  Incidental finding demonstrated a 10 x 8 cm lesion in the left upper quadrant with coarse peripheral calcifications.  Reading radiologist stated differentials include an exophytic renal lesion or an adrenal lesion and recommendation for further follow-up was made.  I personally reviewed the imaging report and images in detail and reviewed both of them with the patient today.    Patient denies any significant symptoms.  He has a residual cough that continues to improve.  He was treated for the flu/cold back in December, 3 months ago.  He has some sputum production which is clear.  He denies any hemoptysis or shortness of breath.  He denies fevers chills fatigue, weight loss or night sweats.  He denies congestion or sore throat.  He denies chest pain, heart palpitations or swelling in his legs.  He denies any abdominal pain, constipation, diarrhea, nausea or vomiting.  He voids without difficulty.  He does complain of some back stiffness every once in a while.  He has a chronic rash that he has had since 1968 which is well controlled with a special prescribed cream he has been using since 1968.  He denies any dizziness, headaches or peripheral neuropathy.  He denies any anxiety depression or insomnia.    Please  see past medical and surgical history below.    Patient is a current smoker.  He has been smoking for approximately 60 years and average of 1 pack/day.  He has been taking Chantix for approximately 6 weeks and is down to 5 cigarettes/day.  He is hopeful that he will be completely off cigarettes by the end of this week.    Patient does have a family history of cancer in his father who was diagnosed with lung cancer.    No Known Allergies  Current Outpatient Medications on File Prior to Visit   Medication Sig Dispense Refill   • varenicline (CHANTIX) 1 MG tablet Take 1 Tab by mouth 2 times a day. Take after starter sharon. 60 Tab 3   • albuterol 108 (90 Base) MCG/ACT Aero Soln inhalation aerosol Inhale 2 Puffs by mouth every 6 hours as needed for Shortness of Breath. 1 Inhaler 0   • varenicline (CHANTIX SHARON) 0.5 MG X 11 & 1 MG X 42 tablet As directed for starter sharon 56 Tab 3     No current facility-administered medications on file prior to visit.      History reviewed. No pertinent past medical history.  History reviewed. No pertinent surgical history.  Family History   Problem Relation Age of Onset   • Dementia Mother    • Lung Disease Father    • Cancer Father         Lung CA   • Dementia Sister    • Heart Disease Brother    • Hypertension Brother    • Dementia Sister         Alzheimer's   • Diabetes Neg Hx      Social History     Socioeconomic History   • Marital status:      Spouse name: Not on file   • Number of children: Not on file   • Years of education: Not on file   • Highest education level: Not on file   Occupational History   • Not on file   Social Needs   • Financial resource strain: Not on file   • Food insecurity     Worry: Not on file     Inability: Not on file   • Transportation needs     Medical: Not on file     Non-medical: Not on file   Tobacco Use   • Smoking status: Current Every Day Smoker     Packs/day: 1.00     Years: 60.00     Pack years: 60.00     Types: Cigarettes   • Smokeless  tobacco: Never Used   • Tobacco comment: since age 14   Substance and Sexual Activity   • Alcohol use: No     Alcohol/week: 0.0 oz     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   • Drug use: No   • Sexual activity: Not Currently   Lifestyle   • Physical activity     Days per week: Not on file     Minutes per session: Not on file   • Stress: Not on file   Relationships   • Social connections     Talks on phone: Not on file     Gets together: Not on file     Attends Islam service: Not on file     Active member of club or organization: Not on file     Attends meetings of clubs or organizations: Not on file     Relationship status: Not on file   • Intimate partner violence     Fear of current or ex partner: Not on file     Emotionally abused: Not on file     Physically abused: Not on file     Forced sexual activity: Not on file   Other Topics Concern   • Not on file   Social History Narrative   • Not on file       Review of Systems   Constitutional: Negative for chills, diaphoresis, fever, malaise/fatigue and weight loss.   HENT: Negative for congestion and sore throat.    Respiratory: Positive for cough (Improving per patient but still present) and sputum production (clear). Negative for hemoptysis, shortness of breath (none at this time, but did have SOB a few month prior with viral infection) and wheezing.    Cardiovascular: Negative for chest pain, palpitations and leg swelling.   Gastrointestinal: Negative for abdominal pain, constipation, diarrhea, nausea and vomiting.   Genitourinary: Negative for dysuria.   Musculoskeletal:        Back stiffness at times   Skin: Positive for rash (present throughout since 1968). Negative for itching.   Neurological: Negative for dizziness, tingling and headaches.   Psychiatric/Behavioral: Negative for depression. The patient is not nervous/anxious and does not have insomnia.           Objective:     /68 (BP Location: Right arm, Patient Position: Sitting, BP Cuff Size:  "Adult)   Pulse 68   Temp 36.7 °C (98.1 °F) (Temporal)   Resp 16   Ht 1.63 m (5' 4.17\")   Wt 71.7 kg (158 lb 1.1 oz)   SpO2 97%   BMI 26.99 kg/m²      Physical Exam  Vitals signs reviewed.   Constitutional:       General: He is not in acute distress.     Appearance: Normal appearance. He is well-developed. He is not diaphoretic.   HENT:      Head: Normocephalic and atraumatic.      Mouth/Throat:      Mouth: Mucous membranes are moist.      Pharynx: Oropharynx is clear. No oropharyngeal exudate.   Eyes:      General: No scleral icterus.        Right eye: No discharge.         Left eye: No discharge.      Conjunctiva/sclera: Conjunctivae normal.      Pupils: Pupils are equal, round, and reactive to light.   Neck:      Musculoskeletal: Normal range of motion and neck supple. No muscular tenderness.      Thyroid: No thyromegaly.   Cardiovascular:      Rate and Rhythm: Normal rate and regular rhythm.      Pulses: Normal pulses.      Heart sounds: Normal heart sounds. No murmur. No friction rub. No gallop.    Pulmonary:      Effort: Pulmonary effort is normal. No respiratory distress.      Breath sounds: Normal breath sounds. No wheezing.   Abdominal:      General: Bowel sounds are normal. There is no distension.      Palpations: Abdomen is soft. There is mass (LUQ mass noted on exam).      Tenderness: There is no abdominal tenderness.   Musculoskeletal: Normal range of motion.         General: No tenderness.   Lymphadenopathy:      Head:      Right side of head: No submental, submandibular, tonsillar, preauricular, posterior auricular or occipital adenopathy.      Left side of head: No submental, submandibular, tonsillar, preauricular, posterior auricular or occipital adenopathy.      Cervical: No cervical adenopathy.      Upper Body:      Right upper body: No supraclavicular adenopathy.      Left upper body: No supraclavicular adenopathy.   Skin:     General: Skin is warm and dry.      Coloration: Skin is not " pale.      Findings: No erythema or rash.   Neurological:      Mental Status: He is alert and oriented to person, place, and time.   Psychiatric:         Mood and Affect: Mood normal.         Behavior: Behavior normal.          Ct-lung Cancer-screening    Addendum Date: 3/5/2020    Addendum: 3 month follow-up is recommended for the peripheral right middle lobe ill-defined opacity. The groundglass nodular opacity in the right lower lobe can also be further evaluated at the time of 3 month follow-up. The groundglass nodular opacity without other findings would otherwise require a 12 month follow-up examination.     Addendum Date: 3/5/2020    Addendum: Lung RADS: 2 - Benign Appearance or Behavior Nodules with a very low likelihood of becoming a clinically active cancer due to size or lack of growth Findings: solid nodule(s): less than 6 mm or new less than 4 mm part solid nodule(s): less than 6 mm total diameter on baseline screening non solid nodule(s) (GGN): less than 30 mm OR greater than or equal to 30 mm and unchanged or slowly growing category 3 or 4 nodules unchanged for greater than or equal to 3 months perifissural nodule(s) less than 10 mm Management: Continue annual screening with LDCT in 12 months    Result Date: 3/5/2020  3/2/2020 3:46 PM HISTORY/REASON FOR EXAM:  lung cancer screening; 60 pack-year smoking history TECHNIQUE/EXAM DESCRIPTION AND NUMBER OF VIEWS: Lung cancer screening without contrast. Low dose noncontrast helical images were obtained of the chest from the lung apices through the costophrenic sulci utilizing thin collimation and intervals with reconstructed images sent to PACS in axial, coronal and sagittal planes. Low dose optimization technique was utilized for this CT exam including automated exposure control and adjustment of the mA and/or kV according to patient size. COMPARISON: Plain film 12/9/2019. FINDINGS: Atherosclerotic plaque is seen in the aorta. Ascending aorta measures 3.8  cm in diameter. There is coronary artery calcification. There is trace pericardial fluid. No pleural effusion is seen. There are small mediastinal lymph nodes. Evaluation of the missael is limited by the lack of intravenous contrast. There are small axillary lymph nodes. There are emphysematous changes. No focal consolidation or pneumothorax is seen. There is patchy opacity in the peripheral right middle lobe which could be infectious or inflammatory. There is mild right basilar atelectasis. There is a focal groundglass opacity in the right lower lobe measuring 1.2 x 1.3 cm. There is mild left basilar atelectasis. There is secretions within bilateral mainstem bronchi. There is mild lingular atelectasis. There is elevation of the left hemidiaphragm. Limited views were obtained of the upper abdomen. There is a 1.4 cm right adrenal nodule compatible with an adrenal adenoma. There is a lesion with coarse peripheral calcifications in the left upper quadrant measuring 10 x 8 cm which may represent a heavily calcified exophytic renal lesion or an adrenal lesion. Degenerative changes are seen in the spine.     Ill-defined opacity in the peripheral right middle lobe may be infectious/inflammatory. Follow-up CT chest in 3 months is recommended. Groundglass nodular opacity in the right lower lobe measuring 1.3 x 1.2 cm for which follow-up CT chest in 3 months is recommended. Emphysematous changes. Atherosclerotic plaque including coronary artery calcification. 10 x 8 cm lesion in the left upper quadrant with coarse peripheral calcifications may be an exophytic renal lesion or an adrenal lesion. Further evaluation with  dedicated imaging of the abdomen with contrast is recommended.       Assessment/Plan:       1. Ground glass opacity present on imaging of lung  CT-CHEST (THORAX) W/O   2. Abdominal mass, left upper quadrant  CT-ABDOMEN-PELVIS WITH     Plan  1.  Patient with a right middle lobe groundglass nodule and a right lower  lobe groundglass nodule.  Patient does have high risk due to 60-pack-year smoking history.  Did review the CT scan in detail with the patient today and did recommend follow-up CT of the chest without contrast in approximately 3 months.      2.  Patient with an incidental finding showing a 10 x 8 cm lesion in the left upper abdominal quadrant with coarse peripheral calcifications.  Differentials include a exophytic renal lesion or an adrenal lesion and further work-up is recommended.  Did discuss in detail with the patient today and have proceeded with a CT of the abdomen and pelvis with contrast for further evaluation.  Patient will follow-up with me in the clinic to review those results and discuss further plan of care.    Patient verbalized understanding and is in agreement to proceed with plan of care as discussed above.      Please note that this dictation was created using voice recognition software. I have made every reasonable attempt to correct obvious errors, but I expect that there are errors of grammar and possibly content that I did not discover before finalizing the note.

## 2020-03-09 NOTE — TELEPHONE ENCOUNTER
1. Caller Name: Jaskaran Le        Call Back Number: [phone]        2.  Does patient have any active symptoms of respiratory illness (fever OR cough OR shortness of breath)? Yes, the patient reports the following respiratory symptoms: cough. Patient stated he has had this cough for a long time and has been seen by a provider and does not have any active flu like symptoms.    3. In the last 30 days, has the patient traveled outside of the country OR in a high risk area within the  OR have any known contact with someone who has?  No, patient was made aware he will be masked upon entrance to the office.     4. Patient will be seen as scheduled at 11:30 am on 03/09/2020.

## 2020-03-11 ENCOUNTER — TELEPHONE (OUTPATIENT)
Dept: HEMATOLOGY ONCOLOGY | Facility: MEDICAL CENTER | Age: 75
End: 2020-03-11

## 2020-03-11 ENCOUNTER — HOSPITAL ENCOUNTER (OUTPATIENT)
Dept: RADIOLOGY | Facility: MEDICAL CENTER | Age: 75
End: 2020-03-11
Attending: NURSE PRACTITIONER
Payer: MEDICARE

## 2020-03-11 DIAGNOSIS — R19.02 ABDOMINAL MASS, LEFT UPPER QUADRANT: ICD-10-CM

## 2020-03-11 PROCEDURE — 74177 CT ABD & PELVIS W/CONTRAST: CPT

## 2020-03-11 PROCEDURE — 700117 HCHG RX CONTRAST REV CODE 255: Performed by: NURSE PRACTITIONER

## 2020-03-11 RX ADMIN — IOHEXOL 100 ML: 350 INJECTION, SOLUTION INTRAVENOUS at 14:30

## 2020-03-11 RX ADMIN — IOHEXOL 25 ML: 240 INJECTION, SOLUTION INTRATHECAL; INTRAVASCULAR; INTRAVENOUS; ORAL at 14:30

## 2020-03-11 NOTE — PROGRESS NOTES
Subjective:      Jaskaran Le is a 74 y.o. male who presents with No chief complaint on file.            HPI    Patient seen today in follow-up for CT scan results.    Patient was initially seen after an abnormal lung cancer screening CT showed a large 10 x 8 cm lesion in the left upper quadrant with coarse peripheral calcifications.  Reading radiologist stated differentials include an exophytic renal lesion or an adrenal lesion and further work-up was recommended.  He did complete the CT scan of the abdomen and pelvis.    ROS       Objective:     There were no vitals taken for this visit.     Physical Exam            Assessment/Plan:       There are no diagnoses linked to this encounter.

## 2020-03-11 NOTE — TELEPHONE ENCOUNTER
Phone Number Called: [phone]    Call outcome: Did not leave a detailed message. Requested patient to call back.    Message: Left message for patient to return call to confirm appointment for tomorrow, to also confirm if patient has any respirtatory illness, and to make them aware of no visitors.

## 2020-03-12 ENCOUNTER — TELEPHONE (OUTPATIENT)
Dept: HEMATOLOGY ONCOLOGY | Facility: MEDICAL CENTER | Age: 75
End: 2020-03-12

## 2020-03-12 ENCOUNTER — APPOINTMENT (OUTPATIENT)
Dept: HEMATOLOGY ONCOLOGY | Facility: MEDICAL CENTER | Age: 75
End: 2020-03-12
Payer: MEDICARE

## 2020-03-12 DIAGNOSIS — E27.8 ADRENAL NODULE (HCC): ICD-10-CM

## 2020-03-12 DIAGNOSIS — R19.02 ABDOMINAL MASS, LEFT UPPER QUADRANT: ICD-10-CM

## 2020-03-12 NOTE — TELEPHONE ENCOUNTER
Patient was recently seen after an abnormal lung cancer screening CT showing an incidental finding of a 10 x 8 cm lesion in the left upper quadrant of the abdomen noting coarse peripheral calcifications.  Differentials include an exophytic renal lesion versus an adrenal lesion.  Patient recently underwent a CT abdomen and pelvis with contrast for further evaluation of this mass.    Results do show a large rim calcified mass in the left upper quadrant measuring 9.8 x 7.8 cm in size.  According to the reading radiologist this is likely of the adrenal origin and questioning whether this is a large old calcified hematoma.  However there is a soft tissue density extrinsic to the peripheral calcification and reading radiologist is recommending a multiphasic MRI of the abdomen for better evaluation.  There is also a 1.4 cm right adrenal lesion noted on CT and recommendation for MRI will also have better evaluation of this as well.    Contacted patient over the phone to discuss the results of the CT and let him know that we do recommend an MRI.  Patient verbalized understanding is in agreement with the plan.  I will have him follow-up with me in the clinic to review the results of the MRI once completed.      Ct-abdomen-pelvis With    Result Date: 3/11/2020  3/11/2020 2:24 PM HISTORY/REASON FOR EXAM:  10 x 8 cm lesion with coarse peripheral calcifications in the left upper quadrant of the abdomen; 10 x 8 cm lesion with coarse peripheral calcifications in the left upper quadrant of the abdomen - calcified exophytic renal lesion vs an adrenal lesion. TECHNIQUE/EXAM DESCRIPTION:   CT scan of the abdomen and pelvis with contrast. Contrast-enhanced helical scanning was obtained from the diaphragmatic domes through the pubic symphysis following the bolus administration of nonionic contrast without complication. 100 mL of Omnipaque 350 nonionic contrast was administered without complication. Low dose optimization technique was  utilized for this CT exam including automated exposure control and adjustment of the mA and/or kV according to patient size. COMPARISON: The lung cancer screening CT chest 3/2/2020 FINDINGS: Chest Base: CT lung cancer screening CT for details. Liver:  Normal. Gallbladder: Normal Biliary tract: Nondilated. Pancreas: Normal. Spleen: Normal. Adrenals: Large rim calcified mass in the left upper quadrant likely is of adrenal origin measuring about 9.8 x 7.8 cm. This is of uncertain etiology, possibly a large old calcified hematoma. There is soft tissue density extrinsic to the peripheral calcification however. Further evaluation with multi phase MRI of the abdomen to evaluate for enhancement and possible mass is recommended. Indeterminate 14 mm right adrenal nodule. Disc may also be further assessed with MRI. Kidneys and Collecting Systems:  Normal. Gastrointestinal tract:  No bowel obstruction.   There is colonic diverticulosis without acute diverticulitis. The appendix is not visualized. No pericecal inflammatory changes. Peritoneum: No free air or free fluid. Reproductive organs:  Normal. Bladder:  Normal. Vessels:  There is mild atherosclerosis.  Normal caliber vessels. Lymph  Nodes:  No lymphadenopathy. Abdominal wall: Within normal limits. Bones:  No acute or aggressive abnormality.     1.  Large rim calcified mass in the left upper quadrant is most likely of adrenal origin. This of uncertain etiology, possibly old calcified hematoma. There is some soft tissue density extrinsic to the peripheral calcification. Multi phase MRI of the abdomen without and with contrast is recommended for further evaluation as neoplasm cannot be excluded. 2.  1.4 cm right adrenal lesion which may also be further evaluated with MRI. 3.  No adenopathy. 4.  Colonic diverticulosis. 5.  Findings in the chest as detailed on recent lung cancer screening CT 3/2/2020.       1. Abdominal mass, left upper quadrant  MR-ABDOMEN-WITH & W/O   2.  Adrenal nodule (HCC)  MR-ABDOMEN-WITH & W/O

## 2020-03-19 ENCOUNTER — APPOINTMENT (OUTPATIENT)
Dept: RADIOLOGY | Facility: MEDICAL CENTER | Age: 75
End: 2020-03-19
Attending: NURSE PRACTITIONER
Payer: MEDICARE

## 2020-03-19 DIAGNOSIS — E27.8 ADRENAL NODULE (HCC): ICD-10-CM

## 2020-03-19 DIAGNOSIS — R19.02 ABDOMINAL MASS, LEFT UPPER QUADRANT: ICD-10-CM

## 2020-03-19 PROCEDURE — A9576 INJ PROHANCE MULTIPACK: HCPCS | Performed by: NURSE PRACTITIONER

## 2020-03-19 PROCEDURE — 74183 MRI ABD W/O CNTR FLWD CNTR: CPT

## 2020-03-19 PROCEDURE — 700117 HCHG RX CONTRAST REV CODE 255: Performed by: NURSE PRACTITIONER

## 2020-03-19 RX ADMIN — GADOTERIDOL 15 ML: 279.3 INJECTION, SOLUTION INTRAVENOUS at 11:47

## 2020-03-20 ENCOUNTER — TELEPHONE (OUTPATIENT)
Dept: HEMATOLOGY ONCOLOGY | Facility: MEDICAL CENTER | Age: 75
End: 2020-03-20

## 2020-03-20 NOTE — TELEPHONE ENCOUNTER
1. Caller Name: Jaskaran Le                          Call Back Number: [phone] (home)         2.  Does patient have any active symptoms of respiratory illness (fever OR cough OR shortness of breath)? No.     Pt aware of covid screening and no visitors

## 2020-03-23 ENCOUNTER — OFFICE VISIT (OUTPATIENT)
Dept: HEMATOLOGY ONCOLOGY | Facility: MEDICAL CENTER | Age: 75
End: 2020-03-23
Payer: MEDICARE

## 2020-03-23 VITALS
HEIGHT: 64 IN | RESPIRATION RATE: 16 BRPM | TEMPERATURE: 98.8 F | SYSTOLIC BLOOD PRESSURE: 110 MMHG | BODY MASS INDEX: 27.61 KG/M2 | DIASTOLIC BLOOD PRESSURE: 66 MMHG | OXYGEN SATURATION: 95 % | HEART RATE: 80 BPM | WEIGHT: 161.71 LBS

## 2020-03-23 DIAGNOSIS — R19.02 LEFT UPPER QUADRANT ABDOMINAL MASS: ICD-10-CM

## 2020-03-23 DIAGNOSIS — R91.8 GROUND GLASS OPACITY PRESENT ON IMAGING OF LUNG: ICD-10-CM

## 2020-03-23 PROCEDURE — 99213 OFFICE O/P EST LOW 20 MIN: CPT | Performed by: NURSE PRACTITIONER

## 2020-03-23 ASSESSMENT — ENCOUNTER SYMPTOMS
CHILLS: 0
NAUSEA: 0
CONSTIPATION: 0
WEIGHT LOSS: 0
WHEEZING: 0
VOMITING: 0
BLOOD IN STOOL: 0
FEVER: 0
COUGH: 0
SHORTNESS OF BREATH: 0
DIARRHEA: 0

## 2020-03-23 ASSESSMENT — PAIN SCALES - GENERAL: PAINLEVEL: NO PAIN
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
## 2020-04-16 ENCOUNTER — OFFICE VISIT (OUTPATIENT)
Dept: MEDICAL GROUP | Facility: MEDICAL CENTER | Age: 75
End: 2020-04-16
Payer: MEDICARE

## 2020-04-16 VITALS
HEIGHT: 66 IN | HEART RATE: 74 BPM | RESPIRATION RATE: 16 BRPM | DIASTOLIC BLOOD PRESSURE: 70 MMHG | SYSTOLIC BLOOD PRESSURE: 122 MMHG | TEMPERATURE: 97.6 F | OXYGEN SATURATION: 94 % | BODY MASS INDEX: 25.91 KG/M2 | WEIGHT: 161.2 LBS

## 2020-04-16 DIAGNOSIS — J43.9 PULMONARY EMPHYSEMA, UNSPECIFIED EMPHYSEMA TYPE (HCC): ICD-10-CM

## 2020-04-16 DIAGNOSIS — F17.200 TOBACCO DEPENDENCE: ICD-10-CM

## 2020-04-16 DIAGNOSIS — E27.8 LEFT ADRENAL MASS (HCC): ICD-10-CM

## 2020-04-16 PROBLEM — Z00.00 MEDICARE ANNUAL WELLNESS VISIT, INITIAL: Status: RESOLVED | Noted: 2018-03-19 | Resolved: 2020-04-16

## 2020-04-16 PROCEDURE — 99214 OFFICE O/P EST MOD 30 MIN: CPT | Performed by: PHYSICIAN ASSISTANT

## 2020-04-16 NOTE — ASSESSMENT & PLAN NOTE
Chronic condition.  Stable.  Denies any chest pain or shortness of breath.  No coughing.  24 hours of tobacco free.  Feels good about his health today.

## 2020-04-16 NOTE — PROGRESS NOTES
Subjective:   Jaskaran Le is a 74 y.o. male here today for left adrenal mass, tobacco cessation and emphysema.    Left adrenal mass (HCC)  This is a 74-year-old male who is here today to discuss abnormalities of a recent MRI.  CT scan showed a mass in his left adrenal.  MRI showed the following:    IMPRESSION:     1.  The peripherally calcified left adrenal gland lesion is most likely a peripherally calcified hematoma from previous hemorrhage as there is no internal enhancement.   The 2.6 x 2.2 cm soft tissue component along the anterior-inferior margin is most   likely an adenomatous component with an atypical collision tumor containing fat considered a much less likely possibility.  2.  There is a benign 1.5 cm right adrenal gland adenoma.  3.  There is cholelithiasis without biliary dilatation.    When it was mentioned to to him that he had a possible hematoma and that is what caused the mass he recollected an injury when he hit his left kidney area years ago on a worksite.  The pain was so unbearable that he passed out.  He believes that is what has caused this mass.  He does have a CT scan of his chest to follow-up on his abnormal CT ordered by Geneva Baldwin.  That will be done in June.    Tobacco dependence  Chronic condition.  States he has been taking Chantix.  Stop smoking for the last 24 hours.  He states he has not done that in over 40 years.  Started smoking when he was 14.  States that his emphysema has been stable.  No recent exacerbation.  States he will continue Chantix and hopefully will remain tobacco free.    Pulmonary emphysema (CMS-HCC)  Chronic condition.  Stable.  Denies any chest pain or shortness of breath.  No coughing.  24 hours of tobacco free.  Feels good about his health today.      Current medicines (including changes today)  Current Outpatient Medications   Medication Sig Dispense Refill   • varenicline (CHANTIX SHARON) 0.5 MG X 11 & 1 MG X 42 tablet As directed for  "starter tangela 56 Tab 3   • varenicline (CHANTIX) 1 MG tablet Take 1 Tab by mouth 2 times a day. Take after starter tangela. 60 Tab 3   • albuterol 108 (90 Base) MCG/ACT Aero Soln inhalation aerosol Inhale 2 Puffs by mouth every 6 hours as needed for Shortness of Breath. 1 Inhaler 0     No current facility-administered medications for this visit.      He  has no past medical history on file.    Social History and Family History were reviewed and updated.    ROS   No chest pain, no shortness of breath, no abdominal pain and all other systems were reviewed and are negative.       Objective:     /70   Pulse 74   Temp 36.4 °C (97.6 °F) (Temporal)   Resp 16   Ht 1.676 m (5' 6\")   Wt 73.1 kg (161 lb 3.2 oz)   SpO2 94%  Body mass index is 26.02 kg/m².   Physical Exam:  Constitutional: Alert, no distress.  Skin: Warm, dry, good turgor, no rashes in visible areas.  Eye: Equal, round and reactive, conjunctiva clear, lids normal.  ENMT: Lips without lesions, good dentition, oropharynx clear.  Neck: Trachea midline, no masses.   Lymph: No cervical or supraclavicular lymphadenopathy  Respiratory: Unlabored respiratory effort, lungs appear clear, no wheezes.  Cardiovascular: Regular rate and rhythm.  Psych: Alert and oriented x3, normal affect and mood.        Assessment and Plan:   The following treatment plan was discussed    1. Left adrenal mass (HCC)  Acute, new onset condition.  Likely benign mass status post hematoma.  Will repeat MRI with and without contrast in 6 months.  Perform sometime in September prior to appointment.  - MR-ABDOMEN-WITH & W/O; Future    2. Tobacco dependence  Chronic condition.  Continue Chantix.  Hopefully his cessation will continue.  Congratulated.    3. Pulmonary emphysema, unspecified emphysema type (HCC)  Chronic condition.  Stable.  Continue rescue inhaler as needed.  Follow-up with CT scan in June.      Followup: Return in about 3 months (around 7/16/2020), or if symptoms worsen or fail " to improve.    Please note that this dictation was created using voice recognition software. I have made every reasonable attempt to correct obvious errors, but I expect that there are errors of grammar and possibly content that I did not discover before finalizing the note.

## 2020-04-16 NOTE — ASSESSMENT & PLAN NOTE
Chronic condition.  States he has been taking Chantix.  Stop smoking for the last 24 hours.  He states he has not done that in over 40 years.  Started smoking when he was 14.  States that his emphysema has been stable.  No recent exacerbation.  States he will continue Chantix and hopefully will remain tobacco free.

## 2020-04-16 NOTE — ASSESSMENT & PLAN NOTE
This is a 74-year-old male who is here today to discuss abnormalities of a recent MRI.  CT scan showed a mass in his left adrenal.  MRI showed the following:    IMPRESSION:     1.  The peripherally calcified left adrenal gland lesion is most likely a peripherally calcified hematoma from previous hemorrhage as there is no internal enhancement.   The 2.6 x 2.2 cm soft tissue component along the anterior-inferior margin is most   likely an adenomatous component with an atypical collision tumor containing fat considered a much less likely possibility.  2.  There is a benign 1.5 cm right adrenal gland adenoma.  3.  There is cholelithiasis without biliary dilatation.    When it was mentioned to to him that he had a possible hematoma and that is what caused the mass he recollected an injury when he hit his left kidney area years ago on a worksite.  The pain was so unbearable that he passed out.  He believes that is what has caused this mass.  He does have a CT scan of his chest to follow-up on his abnormal CT ordered by Geneva Baldwin.  That will be done in June.

## 2020-06-02 ENCOUNTER — APPOINTMENT (OUTPATIENT)
Dept: RADIOLOGY | Facility: IMAGING CENTER | Age: 75
End: 2020-06-02
Attending: PHYSICIAN ASSISTANT
Payer: MEDICARE

## 2020-06-02 ENCOUNTER — OFFICE VISIT (OUTPATIENT)
Dept: URGENT CARE | Facility: CLINIC | Age: 75
End: 2020-06-02
Payer: MEDICARE

## 2020-06-02 VITALS
RESPIRATION RATE: 20 BRPM | WEIGHT: 166 LBS | OXYGEN SATURATION: 95 % | SYSTOLIC BLOOD PRESSURE: 126 MMHG | BODY MASS INDEX: 26.68 KG/M2 | DIASTOLIC BLOOD PRESSURE: 80 MMHG | TEMPERATURE: 97.8 F | HEART RATE: 108 BPM | HEIGHT: 66 IN

## 2020-06-02 DIAGNOSIS — J44.9 CHRONIC OBSTRUCTIVE PULMONARY DISEASE, UNSPECIFIED COPD TYPE (HCC): ICD-10-CM

## 2020-06-02 DIAGNOSIS — R05.9 COUGH: ICD-10-CM

## 2020-06-02 PROCEDURE — 71046 X-RAY EXAM CHEST 2 VIEWS: CPT | Mod: TC | Performed by: PHYSICIAN ASSISTANT

## 2020-06-02 PROCEDURE — 99214 OFFICE O/P EST MOD 30 MIN: CPT | Performed by: PHYSICIAN ASSISTANT

## 2020-06-02 RX ORDER — ALBUTEROL SULFATE 90 UG/1
4 AEROSOL, METERED RESPIRATORY (INHALATION) ONCE
OUTPATIENT
Start: 2020-06-02 | End: 2020-06-03

## 2020-06-02 RX ORDER — ALBUTEROL SULFATE 90 UG/1
2 AEROSOL, METERED RESPIRATORY (INHALATION) EVERY 6 HOURS PRN
Qty: 8.5 G | Refills: 0 | Status: SHIPPED | OUTPATIENT
Start: 2020-06-02 | End: 2020-09-24

## 2020-06-02 ASSESSMENT — ENCOUNTER SYMPTOMS
HEADACHES: 0
MYALGIAS: 0
HEMOPTYSIS: 0
DIARRHEA: 0
SHORTNESS OF BREATH: 1
FEVER: 0
SENSORY CHANGE: 0
DIZZINESS: 0
VOMITING: 0
WEAKNESS: 0
NAUSEA: 0
SPUTUM PRODUCTION: 0
FOCAL WEAKNESS: 0
WHEEZING: 0
ABDOMINAL PAIN: 0
COUGH: 1
SPEECH CHANGE: 0
SORE THROAT: 0
CHILLS: 0
EYES NEGATIVE: 1

## 2020-06-02 NOTE — PROGRESS NOTES
Subjective:      Jaskaran Le is a 74 y.o. male who presents with Cough (x 1 day.  Cough, S.O.B., nasal congestion and low O2. Hx of COPD. )        Cough   This is a new problem. The current episode started today (This morning ). Associated symptoms include nasal congestion and shortness of breath. Pertinent negatives include no chest pain, chills, fever, headaches, hemoptysis, myalgias, sore throat or wheezing. Associated symptoms comments: Low O2. Nothing aggravates the symptoms. He has tried a beta-agonist inhaler for the symptoms. The treatment provided significant relief. His past medical history is significant for emphysema.     Cough only this morning when he woke up. Lasted about 1-2 hours when he woke up. Dry cough. Associated shortness of breath at that time. Checked Oxygen saturation levels which were recorded as mid to high 80 percent. Used 2 hits of albuterol inhaler which resolved cough and improved SOB.    Currently, patient denies any cough, fever, chills, headache, loss of taste or smell, body aches, chest pain, abdominal pain, nausea, vomiting, diarrhea, dizziness.  Denies any SOB with walking around or activity.   Reports very mild shortness of breath.     He states he otherwise feels a lot better.     Smokes 2-3 cigarettes per day. Long time smoker.   History of COPD   Chest CT - Lung Cancer screening done on 03/05/2020 which showed emphysematous changes and left upper quadrant lesion.   Denies history of heart disease or Diabetes.     Review of Systems   Constitutional: Negative for chills, fever and malaise/fatigue.   HENT: Negative for sore throat.    Eyes: Negative.    Respiratory: Positive for cough and shortness of breath. Negative for hemoptysis, sputum production and wheezing.    Cardiovascular: Negative for chest pain.   Gastrointestinal: Negative for abdominal pain, diarrhea, nausea and vomiting.   Musculoskeletal: Negative for myalgias.   Neurological: Negative for  "dizziness, sensory change, speech change, focal weakness, weakness and headaches.          Objective:     /80   Pulse 95   Temp 36.6 °C (97.8 °F) (Temporal)   Resp 16   Ht 1.676 m (5' 6\")   Wt 75.3 kg (166 lb)   SpO2 92%   BMI 26.79 kg/m²      Physical Exam  Vitals signs reviewed.   Constitutional:       General: He is not in acute distress.     Appearance: Normal appearance. He is not ill-appearing or toxic-appearing.   HENT:      Head: Normocephalic.      Mouth/Throat:      Mouth: Mucous membranes are moist.      Pharynx: Oropharynx is clear. Uvula midline. No pharyngeal swelling, oropharyngeal exudate, posterior oropharyngeal erythema or uvula swelling.      Tonsils: No tonsillar exudate.   Eyes:      Conjunctiva/sclera: Conjunctivae normal.      Pupils: Pupils are equal, round, and reactive to light.   Neck:      Vascular: No JVD.   Cardiovascular:      Rate and Rhythm: Normal rate and regular rhythm.      Heart sounds: Normal heart sounds.   Pulmonary:      Effort: Pulmonary effort is normal. No tachypnea, bradypnea, accessory muscle usage, prolonged expiration or respiratory distress.      Breath sounds: No stridor. No rhonchi or rales.      Comments: Mild inspiratory wheezes to mid and lower lung fields.   Speaking in full sentences.   Musculoskeletal:      Right lower leg: No edema.      Left lower leg: No edema.   Lymphadenopathy:      Cervical: No cervical adenopathy.   Skin:     General: Skin is warm and dry.      Capillary Refill: Capillary refill takes less than 2 seconds.   Neurological:      General: No focal deficit present.      Mental Status: He is alert and oriented to person, place, and time.   Psychiatric:         Mood and Affect: Mood normal.         Behavior: Behavior normal.       History reviewed. No pertinent past medical history. History reviewed. No pertinent surgical history.   Social History     Socioeconomic History   • Marital status:      Spouse name: Not on file "   • Number of children: Not on file   • Years of education: Not on file   • Highest education level: Not on file   Occupational History   • Not on file   Social Needs   • Financial resource strain: Not on file   • Food insecurity     Worry: Not on file     Inability: Not on file   • Transportation needs     Medical: Not on file     Non-medical: Not on file   Tobacco Use   • Smoking status: Current Every Day Smoker     Packs/day: 1.00     Years: 60.00     Pack years: 60.00     Types: Cigarettes   • Smokeless tobacco: Never Used   • Tobacco comment: since age 14, no smoking for 24 hours   Substance and Sexual Activity   • Alcohol use: No     Alcohol/week: 0.0 oz     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   • Drug use: No   • Sexual activity: Not Currently   Lifestyle   • Physical activity     Days per week: Not on file     Minutes per session: Not on file   • Stress: Not on file   Relationships   • Social connections     Talks on phone: Not on file     Gets together: Not on file     Attends Druze service: Not on file     Active member of club or organization: Not on file     Attends meetings of clubs or organizations: Not on file     Relationship status: Not on file   • Intimate partner violence     Fear of current or ex partner: Not on file     Emotionally abused: Not on file     Physically abused: Not on file     Forced sexual activity: Not on file   Other Topics Concern   • Not on file   Social History Narrative   • Not on file    Patient has no known allergies.        DX: Chest   FINDINGS:  Peripherally calcified ovoid mass in the left upper abdomen is again noted as has been previously described on cross-sectional imaging exams.     The mediastinal and cardiac silhouette is unremarkable.     The pulmonary vascularity is within normal limits.     There is minimal linear interstitial prominence in both lower lobes. Lungs are hyperinflated with increased AP diameter. Slight elevation of the lateral left  hemidiaphragm is again noted.     There is no significant pleural effusion. There is minimal pleural thickening in the left CP angle again noted.     There is no visible pneumothorax.     There are no acute bony abnormalities.     IMPRESSION:     1.  Lungs are hyperinflated consistent with emphysema.  2.  Minimal lower lobe interstitial prominence could be chronic.  3.  There is no acute pneumonia.      Assessment/Plan:     1. Cough    - DX-CHEST-2 VIEWS; Results per radiologist interpretation above   - albuterol inhaler 4 Puff  - albuterol 108 (90 Base) MCG/ACT Aero Soln inhalation aerosol; Inhale 2 Puffs by mouth every 6 hours as needed for Shortness of Breath.  Dispense: 8.5 g; Refill: 0    2. Chronic obstructive pulmonary disease, unspecified COPD type (HCC)    Patient is a 74-year-old male who is very well-appearing here in the clinic.  He states he only had a cough this morning which last 1 to 2 hours and resolved after 2 puffs of albuterol inhaler.  His cough completely resolved.  He had associated shortness of breath at that time.  Presentation he had very mild shortness of breath.  Chest x-ray above which showed no acute abnormalities per radiologist interpretation.    After walking around parking lot 1 loop patient O2 saturation 94% RA  Second loop of walking O2 saturation 88-89% RA.   RR 20   Pulse 108-10.   Patient was asymptomatic during and after walking. He denies any cough, SOB, or dizziness.     Recheck O2 Saturation at 95% RA, pulse 75, after sitting down for 2 minutes. He is no acute distress and feels fine and asymptomatic.  He is very well-appearing.  Patient received 4 puffs of albuterol inhaler.   He has no shortness of breath. Lung re-examination showed no wheezes, rhonchi, or rales.     1) Does the patient share a household with a confirmed COVID-19 case? No  2) To the best of the patient's knowledge, have they been exposed to a confirmed COVID-19 case through travel or a group activity?  No  3) Does the patient have symptoms of respiratory illness (cough, sore throat, shortness of breath, fever)? Yes     Discussed with patient signs and symptoms most likely a mild COPD exacerbation or bronchospasm.  Suspicions for pneumonia are antibiotics at this time.  Suspicions for COVID-19 virus, therefore not tested at this time.  Suspicions for acute emergent pathology are low.      Will refill albuterol inhaler.   Follow-up with his PCP.  Return to the urgent care if symptoms return.  Discussed red flags and indications to present to the emergency room such as return of shortness of breath, cough, fevers, chills, dizziness, chest pain or any other concerns.  Discussed to present to emergency room.   He has an appointment on 06/09/20 with oncology.     Supportive care, differential diagnoses, and indications for immediate follow-up discussed with patient.    Pathogenesis of diagnosis discussed including typical length and natural progression. Patient expresses understanding and agrees to plan.    Please note that this dictation was created using voice recognition software. I have made every reasonable attempt to correct obvious errors, but I expect that there are errors of grammar and possibly content that I did not discover before finalizing the note.

## 2020-06-09 ENCOUNTER — HOSPITAL ENCOUNTER (OUTPATIENT)
Dept: RADIOLOGY | Facility: MEDICAL CENTER | Age: 75
End: 2020-06-09
Attending: NURSE PRACTITIONER
Payer: MEDICARE

## 2020-06-09 DIAGNOSIS — R91.8 GROUND GLASS OPACITY PRESENT ON IMAGING OF LUNG: ICD-10-CM

## 2020-06-09 PROCEDURE — 71250 CT THORAX DX C-: CPT

## 2020-06-10 ENCOUNTER — TELEPHONE (OUTPATIENT)
Dept: HEMATOLOGY ONCOLOGY | Facility: MEDICAL CENTER | Age: 75
End: 2020-06-10

## 2020-06-10 NOTE — TELEPHONE ENCOUNTER
Patient initially seen after an abnormal lung cancer screening noting an incidental finding of a 10 x 8 cm lesion left upper quadrant of his abdomen.  Also seen on the lung cancer screening a 1.3 x 1.2 cm right lower lobe groundglass nodular opacity.  However based on the incidental finding work-up was completed on the upper quadrant lesion.  MRI completed showed a peripherally calcified left adrenal gland lesion and according to the reading radiologist this was most likely a peripheral calcified hematoma from a previous hemorrhage.  Recommendation for patient to follow-up with his PCP which he is currently scheduled for follow-up imaging 6 months from the last MRI.    However based on the groundglass nodular opacity seen on the lung cancer screening in the right lower lobe 3-month follow-up CT scan was recommended.    CT shows a persistent 12 mm groundglass opacity in the right lower lobe.  Does not appear to have changed.  There was interval improvement in consolidation in the peripheral right middle lobe consistent with infectious/inflammatory process.  He does continue to have the persistent peripherally calcified mass in the left upper quadrant of the abdomen.  Based on these findings the groundglass opacity in the right lower lobe is quite stable with improvement in the consolidation noted in the right middle lobe.  Based on the Fleischner Society guidelines recommendation to continue to monitor the groundglass nodule in 12 months.  Do recommend that patient continue on annual lung cancer screening dating from today.  I did review the results with the patient today on the phone and he was very pleased with the results.  Will have patient follow-up with his primary care provider for continued lung cancer screening annually.    Patient is also following up with his PCP for the right upper quadrant abdominal mass as well.    Therefore there is no further follow-up needed with [facility].      Ct-chest (thorax)  W/o    Result Date: 6/9/2020 6/9/2020 12:58 PM HISTORY/REASON FOR EXAM:  peripheral right middle lobe and right lower lobe ground glass opacity; peripheral right middle lobe and right lower lobe ground glass opacity. TECHNIQUE/EXAM DESCRIPTION: CT scan of the chest without contrast. Low Dose noncontrast helical images were obtained of the chest from the lung apices through the costophrenic sulci utilizing thin collimation and intervals with reconstructed images sent to PACS in axial, coronal and sagittal planes. Low dose optimization technique was utilized for this CT exam including automated exposure control and adjustment of the mA and/or kV according to patient size. COMPARISON: March 2, 2020 FINDINGS: The previously noted ill-defined opacity in the peripheral aspect of the right middle lobe has improved consistent with an infectious or inflammatory process versus atelectasis. There is a stable groundglass opacity in the right lower lobe measuring 12 mm. No new or enlarging pulmonary nodule identified. There is moderate emphysema. There is no mediastinal, hilar, or axillary adenopathy. There are coronary artery calcifications. No pericardial effusion. There are no pleural effusions or other abnormality of the pleura. There is degenerative change in the mid thoracic spine with increased thoracic kyphosis. The visualized portions of the liver, spleen, pancreas, and kidneys are unremarkable. A peripherally calcified mass once again noted in the left upper quadrant as described on recent abdominal MRI. There is no upper abdominal adenopathy.     Interval improvement in consolidation peripherally in the right middle lobe most consistent with infectious/inflammatory process versus atelectasis. Persistent 12 mm groundglass opacity in the right lower lobe. Emphysema. Atherosclerotic disease. Persistent peripherally calcified mass in the left upper quadrant. Fleischner Society pulmonary nodule recommendations: Ground  Glass: CT at 6-12 months to confirm persistence, then CT every 2 years until 5 years Part Solid: CT at 3-6 months to confirm persistence. If unchanged and solid component remains less than 6 mm, annual CT should be performed for 5 years. Comments: In practice, part-solid nodules cannot be defined as such until equal to or greater than 6 mm, and nodules less than 6 mm do not usually require follow-up. Persistent part-solid nodules with solid components equal to or greater than 6 mm should  be considered highly suspicious. Note: These recommendations do not apply to lung cancer screening, patients with immunosuppression, or patients with known primary cancer. Fleischner Society 2017 Guidelines for Management of Incidentally Detected Pulmonary Nodules in Adults

## 2020-06-10 NOTE — TELEPHONE ENCOUNTER
Patient called back returning the call for Geneva Baldwin. Let patient know I would relay a message to Geneva Baldwin.

## 2020-09-24 ENCOUNTER — OFFICE VISIT (OUTPATIENT)
Dept: MEDICAL GROUP | Facility: MEDICAL CENTER | Age: 75
End: 2020-09-24
Payer: MEDICARE

## 2020-09-24 VITALS
TEMPERATURE: 97.3 F | DIASTOLIC BLOOD PRESSURE: 68 MMHG | HEART RATE: 68 BPM | OXYGEN SATURATION: 94 % | WEIGHT: 160 LBS | BODY MASS INDEX: 25.71 KG/M2 | SYSTOLIC BLOOD PRESSURE: 106 MMHG | RESPIRATION RATE: 16 BRPM | HEIGHT: 66 IN

## 2020-09-24 DIAGNOSIS — J43.9 PULMONARY EMPHYSEMA, UNSPECIFIED EMPHYSEMA TYPE (HCC): ICD-10-CM

## 2020-09-24 DIAGNOSIS — F17.200 TOBACCO DEPENDENCE: ICD-10-CM

## 2020-09-24 DIAGNOSIS — L98.9 FOOT LESION: ICD-10-CM

## 2020-09-24 DIAGNOSIS — L20.82 FLEXURAL ECZEMA: ICD-10-CM

## 2020-09-24 DIAGNOSIS — Z23 INFLUENZA VACCINE NEEDED: ICD-10-CM

## 2020-09-24 PROCEDURE — G0008 ADMIN INFLUENZA VIRUS VAC: HCPCS | Performed by: PHYSICIAN ASSISTANT

## 2020-09-24 PROCEDURE — 99214 OFFICE O/P EST MOD 30 MIN: CPT | Mod: 25 | Performed by: PHYSICIAN ASSISTANT

## 2020-09-24 PROCEDURE — 90662 IIV NO PRSV INCREASED AG IM: CPT | Performed by: PHYSICIAN ASSISTANT

## 2020-09-24 RX ORDER — BUPROPION HYDROCHLORIDE 150 MG/1
150 TABLET, EXTENDED RELEASE ORAL 2 TIMES DAILY
Qty: 60 TAB | Refills: 2 | Status: SHIPPED | OUTPATIENT
Start: 2020-09-24 | End: 2020-12-17

## 2020-09-24 RX ORDER — ALBUTEROL SULFATE 90 UG/1
2 AEROSOL, METERED RESPIRATORY (INHALATION) EVERY 6 HOURS PRN
Qty: 8 G | Refills: 11 | Status: SHIPPED | OUTPATIENT
Start: 2020-09-24 | End: 2022-11-07 | Stop reason: SDUPTHER

## 2020-09-24 NOTE — PROGRESS NOTES
Subjective:   Jaskaran Le is a 74 y.o. male here today for tobacco dependence, emphysema, foot lesion, eczema and preventative health care.    Tobacco dependence  This is a 74-year-old male who unfortunately has not stop smoking but has cut down substantially on his cigarettes.  Now smoking half a pack from 1 pack/day.  States that Chantix was not effective.  He did not notice any change but took the medication for a few months.  Has tried nicotine patches and gum in the past.  No improvement.  Is willing to try anything to help with emphysema.    Pulmonary emphysema ([facility])  Chronic condition.  Doing well.  Denies any shortness of breath or chest pain.  Requesting refill of rescue inhaler.  Does not use it very often.    Foot lesion  During his last office visit we discussed a foot lesion which I failed to mention in the notes.  He states he has been wearing shoes that appear to be improving the lesion.  Is on the left foot lateral aspect.    Flexural eczema  Chronic condition.  Requesting refill of betamethasone ointment.  Has had eczema for many years.  Uses the cream as needed.       Current medicines (including changes today)  Current Outpatient Medications   Medication Sig Dispense Refill   • buPROPion SR (WELLBUTRIN-SR) 150 MG TABLET SR 12 HR sustained-release tablet Take 1 Tab by mouth 2 times a day. First 3 days take one tablet daily then BID. 60 Tab 2   • betamethasone valerate (VALISONE) 0.1 % Ointment Apply twice daily as needed. 45 g 3   • albuterol 108 (90 Base) MCG/ACT Aero Soln inhalation aerosol Inhale 2 Puffs by mouth every 6 hours as needed for Shortness of Breath. 8 g 11     No current facility-administered medications for this visit.      He  has no past medical history on file.    Social History and Family History were reviewed and updated.    ROS   No chest pain, no shortness of breath, no abdominal pain and all other systems were reviewed and are negative.       Objective:  "    /68   Pulse 68   Temp 36.3 °C (97.3 °F) (Temporal)   Resp 16   Ht 1.676 m (5' 6\")   Wt 72.6 kg (160 lb)   SpO2 94%  Body mass index is 25.82 kg/m².   Physical Exam:  Constitutional: Alert, no distress.  Skin: Warm, dry, good turgor, no rashes in visible areas.  Eye: Equal, round and reactive, conjunctiva clear, lids normal.  ENMT: Lips without lesions, good dentition, oropharynx clear.  Neck: Trachea midline, no masses.   Lymph: No cervical or supraclavicular lymphadenopathy  Respiratory: Unlabored respiratory effort, lungs clear to auscultation, no wheezes, no ronchi.  Cardiovascular: Regular rate and rhythm.  Musculoskeletal: Left foot without any lesions noted.  Does have a very small tailor bunion noted.  No erythema.  No tenderness.  Psych: Alert and oriented x3, normal affect and mood.        Assessment and Plan:   The following treatment plan was discussed    1. Tobacco dependence  Chronic use.  We will try Wellbutrin as directed.  Start the first 3 days with 1 tablet then go twice daily.  We will see him in 3 months.  Continue smoking cessation.  - buPROPion SR (WELLBUTRIN-SR) 150 MG TABLET SR 12 HR sustained-release tablet; Take 1 Tab by mouth 2 times a day. First 3 days take one tablet daily then BID.  Dispense: 60 Tab; Refill: 2    2. Pulmonary emphysema, unspecified emphysema type (HCC)  Chronic condition.  Stable.  Renewed rescue inhaler as needed.  - albuterol 108 (90 Base) MCG/ACT Aero Soln inhalation aerosol; Inhale 2 Puffs by mouth every 6 hours as needed for Shortness of Breath.  Dispense: 8 g; Refill: 11    3. Flexural eczema  Chronic condition.  Stable.  Renewed betamethasone ointment.  - betamethasone valerate (VALISONE) 0.1 % Ointment; Apply twice daily as needed.  Dispense: 45 g; Refill: 3    4. Foot lesion  No lesion noted today.  Will resolve condition.    5. Influenza vaccine needed  Administered without complaints.    - INFLUENZA VACCINE, HIGH DOSE (65+ ONLY)    In " follow-up will order labs.  Discussed his annual wellness examination being in April of next year.    Followup: Return in about 3 months (around 12/24/2020), or if symptoms worsen or fail to improve.    Please note that this dictation was created using voice recognition software. I have made every reasonable attempt to correct obvious errors, but I expect that there are errors of grammar and possibly content that I did not discover before finalizing the note.

## 2020-09-24 NOTE — ASSESSMENT & PLAN NOTE
Chronic condition.  Doing well.  Denies any shortness of breath or chest pain.  Requesting refill of rescue inhaler.  Does not use it very often.

## 2020-09-24 NOTE — ASSESSMENT & PLAN NOTE
Chronic condition.  Requesting refill of betamethasone ointment.  Has had eczema for many years.  Uses the cream as needed.

## 2020-09-24 NOTE — ASSESSMENT & PLAN NOTE
During his last office visit we discussed a foot lesion which I failed to mention in the notes.  He states he has been wearing shoes that appear to be improving the lesion.  Is on the left foot lateral aspect.

## 2020-09-24 NOTE — ASSESSMENT & PLAN NOTE
This is a 74-year-old male who unfortunately has not stop smoking but has cut down substantially on his cigarettes.  Now smoking half a pack from 1 pack/day.  States that Chantix was not effective.  He did not notice any change but took the medication for a few months.  Has tried nicotine patches and gum in the past.  No improvement.  Is willing to try anything to help with emphysema.

## 2020-09-28 ENCOUNTER — PATIENT OUTREACH (OUTPATIENT)
Dept: HEALTH INFORMATION MANAGEMENT | Facility: OTHER | Age: 75
End: 2020-09-28

## 2020-10-06 ENCOUNTER — TELEPHONE (OUTPATIENT)
Dept: MEDICAL GROUP | Facility: MEDICAL CENTER | Age: 75
End: 2020-10-06

## 2020-10-06 DIAGNOSIS — E27.8 LEFT ADRENAL MASS (HCC): ICD-10-CM

## 2020-10-06 NOTE — TELEPHONE ENCOUNTER
1. Caller Name: Jaskaran Le                          Call Back Number: [phone] (home)         How would the patient prefer to be contacted with a response: Phone call OK to leave a detailed message    I left a vm for Jose to please get labs done before the MRI on 11/6/2020  Labs are in system.

## 2020-10-09 ENCOUNTER — HOSPITAL ENCOUNTER (OUTPATIENT)
Dept: LAB | Facility: MEDICAL CENTER | Age: 75
End: 2020-10-09
Attending: INTERNAL MEDICINE
Payer: MEDICARE

## 2020-10-09 DIAGNOSIS — E27.8 LEFT ADRENAL MASS (HCC): ICD-10-CM

## 2020-10-09 LAB
ALBUMIN SERPL BCP-MCNC: 4.2 G/DL (ref 3.2–4.9)
BUN SERPL-MCNC: 18 MG/DL (ref 8–22)
CALCIUM SERPL-MCNC: 9.3 MG/DL (ref 8.5–10.5)
CHLORIDE SERPL-SCNC: 101 MMOL/L (ref 96–112)
CO2 SERPL-SCNC: 25 MMOL/L (ref 20–33)
CREAT SERPL-MCNC: 0.88 MG/DL (ref 0.5–1.4)
GLUCOSE SERPL-MCNC: 76 MG/DL (ref 65–99)
PHOSPHATE SERPL-MCNC: 3.4 MG/DL (ref 2.5–4.5)
POTASSIUM SERPL-SCNC: 4.4 MMOL/L (ref 3.6–5.5)
SODIUM SERPL-SCNC: 135 MMOL/L (ref 135–145)

## 2020-10-09 PROCEDURE — 80069 RENAL FUNCTION PANEL: CPT

## 2020-10-09 PROCEDURE — 36415 COLL VENOUS BLD VENIPUNCTURE: CPT

## 2020-10-12 NOTE — RESULT ENCOUNTER NOTE
Rl Sullivan-- Staff message came from radiology requesting labs. He needed renal labs before he could proceed with his MRI scheduled on 11/6. Kidney function is normal, I updated the patient on mychart.    Thanks, Serenity

## 2020-11-06 ENCOUNTER — HOSPITAL ENCOUNTER (OUTPATIENT)
Dept: RADIOLOGY | Facility: MEDICAL CENTER | Age: 75
End: 2020-11-06
Attending: PHYSICIAN ASSISTANT
Payer: MEDICARE

## 2020-11-06 DIAGNOSIS — E27.8 LEFT ADRENAL MASS (HCC): ICD-10-CM

## 2020-11-06 PROCEDURE — 74181 MRI ABDOMEN W/O CONTRAST: CPT

## 2020-12-17 ENCOUNTER — OFFICE VISIT (OUTPATIENT)
Dept: MEDICAL GROUP | Facility: MEDICAL CENTER | Age: 75
End: 2020-12-17
Payer: MEDICARE

## 2020-12-17 VITALS
BODY MASS INDEX: 26.25 KG/M2 | RESPIRATION RATE: 16 BRPM | HEIGHT: 66 IN | OXYGEN SATURATION: 96 % | SYSTOLIC BLOOD PRESSURE: 126 MMHG | WEIGHT: 163.36 LBS | HEART RATE: 66 BPM | DIASTOLIC BLOOD PRESSURE: 72 MMHG | TEMPERATURE: 97.6 F

## 2020-12-17 DIAGNOSIS — I25.10 ATHEROSCLEROSIS OF CORONARY ARTERY WITHOUT ANGINA PECTORIS, UNSPECIFIED VESSEL OR LESION TYPE, UNSPECIFIED WHETHER NATIVE OR TRANSPLANTED HEART: ICD-10-CM

## 2020-12-17 DIAGNOSIS — L98.9 FOOT LESION: ICD-10-CM

## 2020-12-17 DIAGNOSIS — E27.8 LEFT ADRENAL MASS (HCC): ICD-10-CM

## 2020-12-17 DIAGNOSIS — F17.200 TOBACCO DEPENDENCE: ICD-10-CM

## 2020-12-17 PROCEDURE — 99214 OFFICE O/P EST MOD 30 MIN: CPT | Performed by: PHYSICIAN ASSISTANT

## 2020-12-17 NOTE — ASSESSMENT & PLAN NOTE
This is a pleasant 75-year-old male here today to follow-up on his health.  Chronic history of tobacco dependence.  Is down to half a pack a day.  Chantix was not effective.  Was started on Wellbutrin but has not helped as well.  He will try to stop on his own.

## 2020-12-17 NOTE — ASSESSMENT & PLAN NOTE
Complains of some lesions on the side of his feet that currently are healing.  Left side has improved in the right side just started.  Has some discomfort.  In the past was given antibiotic which helped his symptoms.

## 2020-12-17 NOTE — ASSESSMENT & PLAN NOTE
Chronic left adrenal lesion.  1.5 cm.  Recent MRI showed stable lesion.  Recent MRI showed the following:    IMPRESSION:     1.  Stable irregular peripherally calcified LEFT adrenal mass.  Adjacent soft tissue nodule anteromedially is also unchanged.  2.  Stable RIGHT adrenal adenoma.  3.  Cholelithiasis.

## 2020-12-17 NOTE — ASSESSMENT & PLAN NOTE
MRI previously of his chest showed atherosclerotic disease.  No known history of CAD.  He is not on cholesterol-lowering medication.

## 2020-12-17 NOTE — PROGRESS NOTES
Subjective:   Jaskaran Le is a 75 y.o. male here today for tobacco dependence, left adrenal mass, atherosclerotic heart disease and foot lesions.    Tobacco dependence  This is a pleasant 75-year-old male here today to follow-up on his health.  Chronic history of tobacco dependence.  Is down to half a pack a day.  Chantix was not effective.  Was started on Wellbutrin but has not helped as well.  He will try to stop on his own.    Left adrenal mass (HCC)  Chronic left adrenal lesion.  1.5 cm.  Recent MRI showed stable lesion.  Recent MRI showed the following:    IMPRESSION:     1.  Stable irregular peripherally calcified LEFT adrenal mass.  Adjacent soft tissue nodule anteromedially is also unchanged.  2.  Stable RIGHT adrenal adenoma.  3.  Cholelithiasis.    Atherosclerotic heart disease  MRI previously of his chest showed atherosclerotic disease.  No known history of CAD.  He is not on cholesterol-lowering medication.    Foot lesion  Complains of some lesions on the side of his feet that currently are healing.  Left side has improved in the right side just started.  Has some discomfort.  In the past was given antibiotic which helped his symptoms.       Current medicines (including changes today)  Current Outpatient Medications   Medication Sig Dispense Refill   • betamethasone valerate (VALISONE) 0.1 % Ointment Apply twice daily as needed. 45 g 3   • albuterol 108 (90 Base) MCG/ACT Aero Soln inhalation aerosol Inhale 2 Puffs by mouth every 6 hours as needed for Shortness of Breath. 8 g 11     No current facility-administered medications for this visit.      He  has no past medical history on file.    Social History and Family History were reviewed and updated.    ROS   No chest pain, no shortness of breath, no abdominal pain and all other systems were reviewed and are negative.       Objective:     /72 (BP Location: Left arm, Patient Position: Sitting, BP Cuff Size: Adult)   Pulse 66   Temp  "36.4 °C (97.6 °F) (Temporal)   Resp 16   Ht 1.676 m (5' 6\")   Wt 74.1 kg (163 lb 5.8 oz)   SpO2 96%  Body mass index is 26.37 kg/m².   Physical Exam:  Constitutional: Alert, no distress.  Skin: Warm, dry, good turgor, no rashes in visible areas.  Bilateral feet with lesions on the distal aspect on both sides of the outer edges of the fifth toe.  No ulcers noted.  Slight discoloration bilaterally.  Eye: Equal, round and reactive, conjunctiva clear, lids normal.  ENMT: Lips without lesions, good dentition, oropharynx clear.  Neck: Trachea midline, no masses.   Lymph: No cervical or supraclavicular lymphadenopathy  Respiratory: Unlabored respiratory effort, lungs appear clear, no wheezes.  Cardiovascular: Regular rate rhythm.  Psych: Alert and oriented x3, normal affect and mood.        Assessment and Plan:   The following treatment plan was discussed    1. Tobacco dependence  Chronic use.  Half a pack a day currently.  Both Chantix and Wellbutrin unsuccessful.  Does have emphysema.  Would continue to work on reducing tobacco use.    2. Foot lesion  Chronic condition.  No infection noted today.  Advised to change her shoes.  They are significantly worn.  Needs to change his shoes every 3 to 6 months depending upon the use.  Referred to podiatry to establish care for evaluation and treatment if needed.  - REFERRAL TO PODIATRY    3. Left adrenal mass (HCC)  Chronic condition.  Stable masses.  Discussed imaging.    4. Atherosclerosis of coronary artery without angina pectoris, unspecified vessel or lesion type, unspecified whether native or transplanted heart  Chronic condition.  Unknown CAD history.  Offered CT cardiac score but he declined.      Followup: Return if symptoms worsen or fail to improve.    Please note that this dictation was created using voice recognition software. I have made every reasonable attempt to correct obvious errors, but I expect that there are errors of grammar and possibly content that I " did not discover before finalizing the note.

## 2021-01-15 DIAGNOSIS — Z23 NEED FOR VACCINATION: ICD-10-CM

## 2021-02-10 ENCOUNTER — APPOINTMENT (OUTPATIENT)
Dept: FAMILY PLANNING/WOMEN'S HEALTH CLINIC | Facility: IMMUNIZATION CENTER | Age: 76
End: 2021-02-10
Attending: INTERNAL MEDICINE
Payer: MEDICARE

## 2021-02-10 ENCOUNTER — IMMUNIZATION (OUTPATIENT)
Dept: FAMILY PLANNING/WOMEN'S HEALTH CLINIC | Facility: IMMUNIZATION CENTER | Age: 76
End: 2021-02-10
Payer: MEDICARE

## 2021-02-10 DIAGNOSIS — Z23 ENCOUNTER FOR VACCINATION: Primary | ICD-10-CM

## 2021-02-10 DIAGNOSIS — Z23 NEED FOR VACCINATION: ICD-10-CM

## 2021-02-10 PROCEDURE — 0001A PFIZER SARS-COV-2 VACCINE: CPT | Performed by: INTERNAL MEDICINE

## 2021-02-10 PROCEDURE — 91300 PFIZER SARS-COV-2 VACCINE: CPT | Performed by: INTERNAL MEDICINE

## 2021-03-02 ENCOUNTER — OFFICE VISIT (OUTPATIENT)
Dept: URGENT CARE | Facility: PHYSICIAN GROUP | Age: 76
End: 2021-03-02
Payer: MEDICARE

## 2021-03-02 ENCOUNTER — APPOINTMENT (OUTPATIENT)
Dept: RADIOLOGY | Facility: IMAGING CENTER | Age: 76
End: 2021-03-02
Attending: NURSE PRACTITIONER
Payer: MEDICARE

## 2021-03-02 VITALS
HEIGHT: 66 IN | BODY MASS INDEX: 25.71 KG/M2 | RESPIRATION RATE: 18 BRPM | TEMPERATURE: 97.5 F | SYSTOLIC BLOOD PRESSURE: 124 MMHG | OXYGEN SATURATION: 89 % | DIASTOLIC BLOOD PRESSURE: 70 MMHG | WEIGHT: 160 LBS | HEART RATE: 88 BPM

## 2021-03-02 DIAGNOSIS — R06.02 SHORTNESS OF BREATH AT REST: ICD-10-CM

## 2021-03-02 DIAGNOSIS — J44.1 COPD WITH ACUTE EXACERBATION (HCC): ICD-10-CM

## 2021-03-02 PROCEDURE — 71046 X-RAY EXAM CHEST 2 VIEWS: CPT | Mod: TC | Performed by: NURSE PRACTITIONER

## 2021-03-02 PROCEDURE — 99214 OFFICE O/P EST MOD 30 MIN: CPT | Performed by: NURSE PRACTITIONER

## 2021-03-02 RX ORDER — DOXYCYCLINE HYCLATE 100 MG/1
100 CAPSULE ORAL 2 TIMES DAILY
Qty: 14 CAPSULE | Refills: 0 | Status: SHIPPED | OUTPATIENT
Start: 2021-03-02 | End: 2021-03-29 | Stop reason: SDUPTHER

## 2021-03-02 RX ORDER — PREDNISONE 10 MG/1
TABLET ORAL
Qty: 10 QUANTITY SUFFICIENT | Refills: 0 | Status: SHIPPED | OUTPATIENT
Start: 2021-03-02 | End: 2021-03-06

## 2021-03-02 RX ORDER — ALBUTEROL SULFATE 90 UG/1
2 AEROSOL, METERED RESPIRATORY (INHALATION) EVERY 6 HOURS PRN
Qty: 8 G | Refills: 11 | Status: CANCELLED | OUTPATIENT
Start: 2021-03-02

## 2021-03-02 ASSESSMENT — ENCOUNTER SYMPTOMS
WHEEZING: 1
HEADACHES: 0
EYE DISCHARGE: 0
ORTHOPNEA: 0
DIARRHEA: 0
FEVER: 0
SHORTNESS OF BREATH: 1
COUGH: 1
MYALGIAS: 0
SPUTUM PRODUCTION: 1
CHILLS: 0
NAUSEA: 0

## 2021-03-02 NOTE — PROGRESS NOTES
Subjective:      Jaskaran Le is a 75 y.o. male who presents with Cough (SOB, x3 days )            HPI New. 75 year old male with history of COPD and tobacco use presents with 3 day history of cough and shortness of breath. He denies fever, chills, myalgia, nausea or diarrhea. His initial sat was 85 and for me is 91-92. He has been using albuterol inhaler with no relief.  Patient has no known allergies.  Current Outpatient Medications on File Prior to Visit   Medication Sig Dispense Refill   • betamethasone valerate (VALISONE) 0.1 % Ointment Apply twice daily as needed. 45 g 3   • albuterol 108 (90 Base) MCG/ACT Aero Soln inhalation aerosol Inhale 2 Puffs by mouth every 6 hours as needed for Shortness of Breath. 8 g 11     No current facility-administered medications on file prior to visit.     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   • Drug use: No   • Sexual activity: Not Currently   Other Topics Concern   • Not on file   Social History Narrative   • Not on file     Social Determinants of Health     Financial Resource Strain:    • Difficulty of Paying Living Expenses:    Food Insecurity:    • Worried About Running Out of Food in the Last Year:    • Ran Out of Food in the Last Year:    Transportation Needs:    • Lack of Transportation (Medical):    • Lack of Transportation (Non-Medical):    Physical Activity:    • Days of Exercise per Week:    •  "Minutes of Exercise per Session:    Stress:    • Feeling of Stress :    Social Connections:    • Frequency of Communication with Friends and Family:    • Frequency of Social Gatherings with Friends and Family:    • Attends Religion Services:    • Active Member of Clubs or Organizations:    • Attends Club or Organization Meetings:    • Marital Status:    Intimate Partner Violence:    • Fear of Current or Ex-Partner:    • Emotionally Abused:    • Physically Abused:    • Sexually Abused:      Breast Cancer-related family history is not on file.      Review of Systems   Constitutional: Negative for chills, fever and malaise/fatigue.   HENT: Positive for congestion.    Eyes: Negative for discharge.   Respiratory: Positive for cough, sputum production, shortness of breath and wheezing.    Cardiovascular: Negative for chest pain and orthopnea.   Gastrointestinal: Negative for diarrhea and nausea.   Musculoskeletal: Negative for myalgias.   Neurological: Negative for headaches.   Endo/Heme/Allergies: Negative for environmental allergies.          Objective:     /70 (BP Location: Right arm, Patient Position: Sitting, BP Cuff Size: Adult)   Pulse 88   Temp 36.4 °C (97.5 °F) (Temporal)   Resp 18   Ht 1.676 m (5' 6\")   Wt 72.6 kg (160 lb)   SpO2 89%   BMI 25.82 kg/m²      Physical Exam  Vitals and nursing note reviewed.   Constitutional:       General: He is not in acute distress.     Appearance: Normal appearance. He is well-developed.   HENT:      Head: Normocephalic.      Right Ear: External ear normal.      Left Ear: Tympanic membrane and external ear normal.      Nose: Mucosal edema present.      Mouth/Throat:      Pharynx: No posterior oropharyngeal erythema.   Eyes:      General:         Right eye: No discharge.         Left eye: No discharge.      Conjunctiva/sclera: Conjunctivae normal.   Cardiovascular:      Rate and Rhythm: Normal rate and regular rhythm.      Heart sounds: Normal heart sounds. "   Pulmonary:      Breath sounds: Wheezing present.      Comments: Decreased breath sounds throughout with soft wheezing.  Musculoskeletal:         General: Normal range of motion.      Cervical back: Normal range of motion and neck supple.   Lymphadenopathy:      Cervical: No cervical adenopathy.   Skin:     General: Skin is warm and dry.   Neurological:      Mental Status: He is alert and oriented to person, place, and time.   Psychiatric:         Behavior: Behavior normal.         Thought Content: Thought content normal.                 Assessment/Plan:        1. COPD with acute exacerbation (HCC)  doxycycline (VIBRAMYCIN) 100 MG Cap    predniSONE (DELTASONE) 10 MG Tab   2. Shortness of breath at rest  DX-CHEST-2 VIEWS     X-ray with no acute changes.  Doxy/prednisone.  May increase albuterol to 2 puffs q 4 hours.  Differential diagnosis, natural history, supportive care, and indications for immediate follow-up discussed at length.

## 2021-03-13 ENCOUNTER — APPOINTMENT (OUTPATIENT)
Dept: RADIOLOGY | Facility: MEDICAL CENTER | Age: 76
End: 2021-03-13
Attending: EMERGENCY MEDICINE
Payer: MEDICARE

## 2021-03-13 ENCOUNTER — HOSPITAL ENCOUNTER (EMERGENCY)
Facility: MEDICAL CENTER | Age: 76
End: 2021-03-13
Attending: EMERGENCY MEDICINE
Payer: MEDICARE

## 2021-03-13 VITALS
HEIGHT: 67 IN | WEIGHT: 162.7 LBS | SYSTOLIC BLOOD PRESSURE: 142 MMHG | HEART RATE: 65 BPM | RESPIRATION RATE: 13 BRPM | BODY MASS INDEX: 25.54 KG/M2 | OXYGEN SATURATION: 95 % | TEMPERATURE: 98.6 F | DIASTOLIC BLOOD PRESSURE: 79 MMHG

## 2021-03-13 DIAGNOSIS — R42 VERTIGO: ICD-10-CM

## 2021-03-13 LAB
ALBUMIN SERPL BCP-MCNC: 3.9 G/DL (ref 3.2–4.9)
ALBUMIN/GLOB SERPL: 1.1 G/DL
ALP SERPL-CCNC: 78 U/L (ref 30–99)
ALT SERPL-CCNC: 14 U/L (ref 2–50)
ANION GAP SERPL CALC-SCNC: 9 MMOL/L (ref 7–16)
AST SERPL-CCNC: 18 U/L (ref 12–45)
BASOPHILS # BLD AUTO: 0.3 % (ref 0–1.8)
BASOPHILS # BLD: 0.03 K/UL (ref 0–0.12)
BILIRUB SERPL-MCNC: 0.3 MG/DL (ref 0.1–1.5)
BUN SERPL-MCNC: 13 MG/DL (ref 8–22)
CALCIUM SERPL-MCNC: 9.6 MG/DL (ref 8.5–10.5)
CHLORIDE SERPL-SCNC: 100 MMOL/L (ref 96–112)
CO2 SERPL-SCNC: 26 MMOL/L (ref 20–33)
CREAT SERPL-MCNC: 0.64 MG/DL (ref 0.5–1.4)
EOSINOPHIL # BLD AUTO: 0.22 K/UL (ref 0–0.51)
EOSINOPHIL NFR BLD: 2.4 % (ref 0–6.9)
ERYTHROCYTE [DISTWIDTH] IN BLOOD BY AUTOMATED COUNT: 47.6 FL (ref 35.9–50)
GLOBULIN SER CALC-MCNC: 3.6 G/DL (ref 1.9–3.5)
GLUCOSE SERPL-MCNC: 99 MG/DL (ref 65–99)
HCT VFR BLD AUTO: 52.2 % (ref 42–52)
HGB BLD-MCNC: 16.9 G/DL (ref 14–18)
IMM GRANULOCYTES # BLD AUTO: 0.02 K/UL (ref 0–0.11)
IMM GRANULOCYTES NFR BLD AUTO: 0.2 % (ref 0–0.9)
LYMPHOCYTES # BLD AUTO: 2.24 K/UL (ref 1–4.8)
LYMPHOCYTES NFR BLD: 24.7 % (ref 22–41)
MCH RBC QN AUTO: 31.4 PG (ref 27–33)
MCHC RBC AUTO-ENTMCNC: 32.4 G/DL (ref 33.7–35.3)
MCV RBC AUTO: 96.8 FL (ref 81.4–97.8)
MONOCYTES # BLD AUTO: 0.54 K/UL (ref 0–0.85)
MONOCYTES NFR BLD AUTO: 5.9 % (ref 0–13.4)
NEUTROPHILS # BLD AUTO: 6.03 K/UL (ref 1.82–7.42)
NEUTROPHILS NFR BLD: 66.5 % (ref 44–72)
NRBC # BLD AUTO: 0 K/UL
NRBC BLD-RTO: 0 /100 WBC
PLATELET # BLD AUTO: 228 K/UL (ref 164–446)
PMV BLD AUTO: 9.7 FL (ref 9–12.9)
POTASSIUM SERPL-SCNC: 4.5 MMOL/L (ref 3.6–5.5)
PROT SERPL-MCNC: 7.5 G/DL (ref 6–8.2)
RBC # BLD AUTO: 5.39 M/UL (ref 4.7–6.1)
SODIUM SERPL-SCNC: 135 MMOL/L (ref 135–145)
WBC # BLD AUTO: 9.1 K/UL (ref 4.8–10.8)

## 2021-03-13 PROCEDURE — 700111 HCHG RX REV CODE 636 W/ 250 OVERRIDE (IP): Performed by: EMERGENCY MEDICINE

## 2021-03-13 PROCEDURE — 99284 EMERGENCY DEPT VISIT MOD MDM: CPT

## 2021-03-13 PROCEDURE — 700105 HCHG RX REV CODE 258: Performed by: EMERGENCY MEDICINE

## 2021-03-13 PROCEDURE — 85025 COMPLETE CBC W/AUTO DIFF WBC: CPT

## 2021-03-13 PROCEDURE — 36415 COLL VENOUS BLD VENIPUNCTURE: CPT

## 2021-03-13 PROCEDURE — 700117 HCHG RX CONTRAST REV CODE 255: Performed by: EMERGENCY MEDICINE

## 2021-03-13 PROCEDURE — 96374 THER/PROPH/DIAG INJ IV PUSH: CPT | Mod: XU

## 2021-03-13 PROCEDURE — 70498 CT ANGIOGRAPHY NECK: CPT | Mod: MH

## 2021-03-13 PROCEDURE — 80053 COMPREHEN METABOLIC PANEL: CPT

## 2021-03-13 PROCEDURE — 70496 CT ANGIOGRAPHY HEAD: CPT

## 2021-03-13 RX ORDER — MECLIZINE HYDROCHLORIDE 25 MG/1
25 TABLET ORAL 3 TIMES DAILY PRN
Qty: 30 TABLET | Refills: 0 | Status: SHIPPED | OUTPATIENT
Start: 2021-03-13 | End: 2021-03-29

## 2021-03-13 RX ORDER — DIAZEPAM 5 MG/ML
2.5 INJECTION, SOLUTION INTRAMUSCULAR; INTRAVENOUS ONCE
Status: COMPLETED | OUTPATIENT
Start: 2021-03-13 | End: 2021-03-13

## 2021-03-13 RX ORDER — SODIUM CHLORIDE 9 MG/ML
1000 INJECTION, SOLUTION INTRAVENOUS ONCE
Status: COMPLETED | OUTPATIENT
Start: 2021-03-13 | End: 2021-03-13

## 2021-03-13 RX ADMIN — SODIUM CHLORIDE 1000 ML: 9 INJECTION, SOLUTION INTRAVENOUS at 10:49

## 2021-03-13 RX ADMIN — DIAZEPAM 2.5 MG: 5 INJECTION, SOLUTION INTRAMUSCULAR; INTRAVENOUS at 10:50

## 2021-03-13 RX ADMIN — IOHEXOL 80 ML: 350 INJECTION, SOLUTION INTRAVENOUS at 11:39

## 2021-03-13 NOTE — ED TRIAGE NOTES
Chief Complaint   Patient presents with   • Vertigo   • Nausea   • Blurred Vision     Pt states that he has had several brief episodes of vertigo, blurred vision, and nausea that started at 1200 yesterday.  Reports symptoms increase with standing and movement.  Denies lightheadedness/near syncope.  Pt has no unilateral deficits.  No speech changes.  Triage process explained to patient.  Pt instructed to inform RN if any changes or questions arise.  Pt back to waiting room.

## 2021-03-13 NOTE — ED NOTES
Pt ambulate to room with assistance of family member. Agree with triage notes, chart up for ERP. Nystagmus noted by this RN. Pt denies vomiting but reports mild nausea, dizziness. Pt denies numbness and tingling, notes intermittent double vision.

## 2021-03-13 NOTE — ED NOTES
"Pt reports resolution of dizziness after medicaion, notes \"the curtains are no longer moving\".   "

## 2021-03-13 NOTE — ED NOTES
Discharge instructions given to patient, prescriptions provided, a verbal understanding of all instructions was stated. IV removed, cathlon intact, site without s/s of infection. Pt preferred to walk out accompanied by daughter VSS,  all belongings accounted for.

## 2021-03-13 NOTE — ED PROVIDER NOTES
ED Provider Note    CHIEF COMPLAINT  Chief Complaint   Patient presents with    Vertigo    Nausea    Blurred Vision       HPI  Jaskaran Le is a 75 y.o. male who presents for evaluation of dizziness that began yesterday.  Is been intermittent since, improved with lying down.  Head movement does seem to exacerbate it.  Nauseated but has not vomited.  Most concerning for the daughter at the bedside seems to be difficulty with his gait as he is stumbling.  He has never had symptoms like this in the past.  New onset was not exactly severe and acute, and its course has been waxing and waning since onset.  No headache.  No weakness numbness or tingling of the extremities.  No chest pain or shortness of breath.  He has a history of COPD and autoimmune dermatitis, takes no prescribed pills on a daily basis, has not been diagnosed with high blood pressure, high cholesterol or diabetes.  He is a smoker however.  The dizziness is described more as a visual disturbance and abnormal movement type pattern, not necessarily the feeling as though anything is spinning.    REVIEW OF SYSTEMS  Negative for fever, rash, chest pain, dyspnea, abdominal pain, vomiting, diarrhea, headache, focal weakness, focal numbness, focal tingling, back pain. All other systems are negative.     PAST MEDICAL HISTORY  Past Medical History:   Diagnosis Date    Chronic obstructive pulmonary disease (HCC)        FAMILY HISTORY  Family History   Problem Relation Age of Onset    Dementia Mother     Lung Disease Father     Cancer Father         Lung CA    Dementia Sister     Heart Disease Brother     Hypertension Brother     Dementia Sister         Alzheimer's    Diabetes Neg Hx        SOCIAL HISTORY  Social History     Tobacco Use    Smoking status: Current Every Day Smoker     Packs/day: 0.50     Years: 60.00     Pack years: 30.00     Types: Cigarettes    Smokeless tobacco: Never Used    Tobacco comment: since age 14, no smoking for 24 hours  "  Substance Use Topics    Alcohol use: No     Alcohol/week: 0.0 oz     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 No pertinent surgical history.    CURRENT MEDICATIONS  I personally reviewed the medication list in the charting documentation.     ALLERGIES  No Known Allergies    MEDICAL RECORD  I have reviewed patient's medical record and pertinent results are listed above.      PHYSICAL EXAM  VITAL SIGNS: /86   Pulse 65   Temp 36 °C (96.8 °F)   Resp 18   Ht 1.702 m (5' 7\")   Wt 73.8 kg (162 lb 11.2 oz)   SpO2 92%   BMI 25.48 kg/m²    Constitutional: Well appearing patient in no acute distress.  Not toxic, nor ill in appearance.  HENT: Normocephalic, no evidence of acute trauma.  Eyes: No scleral icterus. Normal conjunctiva   Neck: Supple, comfortable, nonpainful range of motion.   Cardiovascular: Regular heart rate and rhythm.   Thorax & Lungs: Chest is nontender.  Lungs are clear to auscultation with good air movement bilaterally.  No wheeze, rhonchi, nor rales.   Abdomen: Soft, with no tenderness, rebound nor guarding.  No mass or pulsatile mass appreciated.  Skin: Warm, dry. No rash appreciated  Extremities/Musculoskeletal: No sign of trauma. No asymmetric calf tenderness, erythema or edema. Normal range of motion   Neurologic: Alert and fully oriented.  Cranial nerve examination normal except nystagmus with rightward gaze.  Normal and symmetric upper and lower extremity motor and sensory function bilaterally.  Normal finger-to-nose.  Minimally ataxic heel-to-shin bilaterally.  Psychiatric: Normal affect appropriate for the clinical situation.    DIAGNOSTIC STUDIES / PROCEDURES    LABS/EKGs  Results for orders placed or performed during the hospital encounter of 03/13/21   CBC WITH DIFFERENTIAL   Result Value Ref Range    WBC 9.1 4.8 - 10.8 K/uL    RBC 5.39 4.70 - 6.10 M/uL    Hemoglobin 16.9 14.0 - 18.0 g/dL    Hematocrit 52.2 (H) 42.0 - 52.0 % "    MCV 96.8 81.4 - 97.8 fL    MCH 31.4 27.0 - 33.0 pg    MCHC 32.4 (L) 33.7 - 35.3 g/dL    RDW 47.6 35.9 - 50.0 fL    Platelet Count 228 164 - 446 K/uL    MPV 9.7 9.0 - 12.9 fL    Neutrophils-Polys 66.50 44.00 - 72.00 %    Lymphocytes 24.70 22.00 - 41.00 %    Monocytes 5.90 0.00 - 13.40 %    Eosinophils 2.40 0.00 - 6.90 %    Basophils 0.30 0.00 - 1.80 %    Immature Granulocytes 0.20 0.00 - 0.90 %    Nucleated RBC 0.00 /100 WBC    Neutrophils (Absolute) 6.03 1.82 - 7.42 K/uL    Lymphs (Absolute) 2.24 1.00 - 4.80 K/uL    Monos (Absolute) 0.54 0.00 - 0.85 K/uL    Eos (Absolute) 0.22 0.00 - 0.51 K/uL    Baso (Absolute) 0.03 0.00 - 0.12 K/uL    Immature Granulocytes (abs) 0.02 0.00 - 0.11 K/uL    NRBC (Absolute) 0.00 K/uL   COMP METABOLIC PANEL   Result Value Ref Range    Sodium 135 135 - 145 mmol/L    Potassium 4.5 3.6 - 5.5 mmol/L    Chloride 100 96 - 112 mmol/L    Co2 26 20 - 33 mmol/L    Anion Gap 9.0 7.0 - 16.0    Glucose 99 65 - 99 mg/dL    Bun 13 8 - 22 mg/dL    Creatinine 0.64 0.50 - 1.40 mg/dL    Calcium 9.6 8.5 - 10.5 mg/dL    AST(SGOT) 18 12 - 45 U/L    ALT(SGPT) 14 2 - 50 U/L    Alkaline Phosphatase 78 30 - 99 U/L    Total Bilirubin 0.3 0.1 - 1.5 mg/dL    Albumin 3.9 3.2 - 4.9 g/dL    Total Protein 7.5 6.0 - 8.2 g/dL    Globulin 3.6 (H) 1.9 - 3.5 g/dL    A-G Ratio 1.1 g/dL   ESTIMATED GFR   Result Value Ref Range    GFR If African American >60 >60 mL/min/1.73 m 2    GFR If Non African American >60 >60 mL/min/1.73 m 2        RADIOLOGY  CT-CTA HEAD WITH & W/O-POST PROCESS   Final Result      1.  CT angiogram of the [place] demonstrating no evidence of large vessel occlusion.      CT-CTA NECK WITH & W/O-POST PROCESSING   Final Result      1.  There is mild calcific plaque in both carotid bulbs and ICA origins with estimated less than 50% bilateral ICA stenosis.   2.  Vertebral arteries are patent without dissection.            COURSE & MEDICAL DECISION MAKING  I have reviewed any medical record  information, laboratory studies and radiographic results as noted above.  Differential diagnoses includes: BPPV versus central vertigo, dehydration, electrolyte abnormalities, anemia    Encounter Summary: This is a 75 y.o. male with presentation consistent with vertigo that began yesterday, just based on the initial history does not fit nicely into a peripheral versus central category.  There is seems to have been a somewhat gradual onset with a waxing and waning-like severity which would hint more towards the central etiologies, seems to improve significantly with lying still which would suggest more of a peripheral source.  He does have unilateral nystagmus on exam and a suggestion of heel-to-shin ataxia although if present only mild.  No other obvious focal deficits appreciated and no other focal deficits reported by the patient.  Will obtain CT and CTA of the head with CTA of the neck, administer some Valium and IV fluids, will obtain blood work and he will be reevaluated.  Of note, he is generally healthy despite his advanced age, he is a lifelong smoker but denies other known risk factors which would include hypertension, hypercholesterolemia and diabetes. ------ blood work is unremarkable.  CT imaging reveals no acute abnormalities, there is some calcific plaques noted in the carotids but less than 50% stenosis, vertebral arteries are patent, upon reevaluation the patient symptoms have resolved.  He was ambulated extensively throughout the emergency department and feels great.  At this point, he is being discharged home in stable condition with the diagnosis of a peripheral benign vertigo.  I will prescribe meclizine.  I have however gone over strict return instructions as well as discussed signs and symptoms that would prompt immediate return and he and his daughter at the bedside expressed understanding.      DISPOSITION: Discharged home in stable condition      FINAL IMPRESSION  1. Vertigo           This  dictation was created using voice recognition software. The accuracy of the dictation is limited to the abilities of the software. I expect there may be some errors of grammar and possibly content. The nursing notes were reviewed and certain aspects of this information were incorporated into this note.    Electronically signed by: Anupam Ken M.D., 3/13/2021 10:25 AM

## 2021-03-17 ENCOUNTER — IMMUNIZATION (OUTPATIENT)
Dept: FAMILY PLANNING/WOMEN'S HEALTH CLINIC | Facility: IMMUNIZATION CENTER | Age: 76
End: 2021-03-17
Attending: INTERNAL MEDICINE
Payer: MEDICARE

## 2021-03-17 DIAGNOSIS — Z23 ENCOUNTER FOR VACCINATION: Primary | ICD-10-CM

## 2021-03-17 PROCEDURE — 0002A PFIZER SARS-COV-2 VACCINE: CPT

## 2021-03-17 PROCEDURE — 91300 PFIZER SARS-COV-2 VACCINE: CPT

## 2021-03-29 ENCOUNTER — OFFICE VISIT (OUTPATIENT)
Dept: MEDICAL GROUP | Facility: MEDICAL CENTER | Age: 76
End: 2021-03-29
Payer: MEDICARE

## 2021-03-29 VITALS
HEIGHT: 66 IN | OXYGEN SATURATION: 93 % | SYSTOLIC BLOOD PRESSURE: 126 MMHG | DIASTOLIC BLOOD PRESSURE: 82 MMHG | WEIGHT: 163.14 LBS | HEART RATE: 98 BPM | RESPIRATION RATE: 16 BRPM | TEMPERATURE: 98.2 F | BODY MASS INDEX: 26.22 KG/M2

## 2021-03-29 DIAGNOSIS — J43.2 CENTRILOBULAR EMPHYSEMA (HCC): ICD-10-CM

## 2021-03-29 DIAGNOSIS — J44.1 COPD WITH ACUTE EXACERBATION (HCC): ICD-10-CM

## 2021-03-29 DIAGNOSIS — E27.8 LEFT ADRENAL MASS (HCC): ICD-10-CM

## 2021-03-29 DIAGNOSIS — Z12.5 SCREENING PSA (PROSTATE SPECIFIC ANTIGEN): ICD-10-CM

## 2021-03-29 DIAGNOSIS — I25.10 ATHEROSCLEROSIS OF CORONARY ARTERY WITHOUT ANGINA PECTORIS, UNSPECIFIED VESSEL OR LESION TYPE, UNSPECIFIED WHETHER NATIVE OR TRANSPLANTED HEART: ICD-10-CM

## 2021-03-29 PROBLEM — L98.9 FOOT LESION: Status: RESOLVED | Noted: 2020-09-24 | Resolved: 2021-03-29

## 2021-03-29 PROCEDURE — 99214 OFFICE O/P EST MOD 30 MIN: CPT | Performed by: PHYSICIAN ASSISTANT

## 2021-03-29 RX ORDER — DOXYCYCLINE HYCLATE 100 MG/1
100 CAPSULE ORAL 2 TIMES DAILY
Qty: 14 CAPSULE | Refills: 0 | Status: SHIPPED | OUTPATIENT
Start: 2021-03-29 | End: 2021-04-05

## 2021-03-29 RX ORDER — PREDNISONE 20 MG/1
TABLET ORAL
Qty: 18 TABLET | Refills: 0 | Status: SHIPPED | OUTPATIENT
Start: 2021-03-29 | End: 2021-06-14

## 2021-03-29 ASSESSMENT — FIBROSIS 4 INDEX: FIB4 SCORE: 1.54

## 2021-03-29 ASSESSMENT — PATIENT HEALTH QUESTIONNAIRE - PHQ9: CLINICAL INTERPRETATION OF PHQ2 SCORE: 0

## 2021-03-30 NOTE — ASSESSMENT & PLAN NOTE
MRI previous of his chest showed atherosclerotic disease.  No history of any known CAD.  No recent cholesterol profile.
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
## 2021-03-30 NOTE — ASSESSMENT & PLAN NOTE
This is a pleasant 75-year-old male who is here today to discuss 2 health issues.  First couple weeks ago he left the pool and had an episode where he was seen [facility].  When he got home he was dizzy.  Went to the ER.  They diagnosed with vertigo.  Room was not spinning.  Those symptoms improved.  But earlier in the month he had issues with breathing.  Chest congestion.  Shortness of breath.  Has a history of emphysema.  In the past he has done well without anything but a rescue inhaler.  Was seen at the urgent care and given doxycycline and a short course of prednisone 20 mg daily.  Prednisone worked well but the medication was completed.  Symptoms have returned.  Denies any fevers.  Has difficulty breathing.  Using his rescue inhaler.

## 2021-03-31 ENCOUNTER — HOSPITAL ENCOUNTER (OUTPATIENT)
Dept: LAB | Facility: MEDICAL CENTER | Age: 76
End: 2021-03-31
Attending: PHYSICIAN ASSISTANT
Payer: MEDICARE

## 2021-03-31 DIAGNOSIS — Z12.5 SCREENING PSA (PROSTATE SPECIFIC ANTIGEN): ICD-10-CM

## 2021-03-31 DIAGNOSIS — I25.10 ATHEROSCLEROSIS OF CORONARY ARTERY WITHOUT ANGINA PECTORIS, UNSPECIFIED VESSEL OR LESION TYPE, UNSPECIFIED WHETHER NATIVE OR TRANSPLANTED HEART: ICD-10-CM

## 2021-03-31 LAB
CHOLEST SERPL-MCNC: 205 MG/DL (ref 100–199)
FASTING STATUS PATIENT QL REPORTED: NORMAL
HDLC SERPL-MCNC: 72 MG/DL
LDLC SERPL CALC-MCNC: 116 MG/DL
PSA SERPL-MCNC: 0.13 NG/ML (ref 0–4)
TRIGL SERPL-MCNC: 86 MG/DL (ref 0–149)

## 2021-03-31 PROCEDURE — 36415 COLL VENOUS BLD VENIPUNCTURE: CPT

## 2021-03-31 PROCEDURE — 80061 LIPID PANEL: CPT

## 2021-03-31 PROCEDURE — 84153 ASSAY OF PSA TOTAL: CPT

## 2021-06-14 ENCOUNTER — OFFICE VISIT (OUTPATIENT)
Dept: SLEEP MEDICINE | Facility: MEDICAL CENTER | Age: 76
End: 2021-06-14
Payer: MEDICARE

## 2021-06-14 VITALS
BODY MASS INDEX: 25.55 KG/M2 | RESPIRATION RATE: 16 BRPM | SYSTOLIC BLOOD PRESSURE: 122 MMHG | HEIGHT: 66 IN | OXYGEN SATURATION: 96 % | WEIGHT: 159 LBS | TEMPERATURE: 97.9 F | DIASTOLIC BLOOD PRESSURE: 64 MMHG | HEART RATE: 63 BPM

## 2021-06-14 DIAGNOSIS — R91.1 LUNG NODULE: ICD-10-CM

## 2021-06-14 DIAGNOSIS — J44.9 CHRONIC OBSTRUCTIVE PULMONARY DISEASE, UNSPECIFIED COPD TYPE (HCC): ICD-10-CM

## 2021-06-14 DIAGNOSIS — Z72.0 TOBACCO USE: ICD-10-CM

## 2021-06-14 PROCEDURE — 99204 OFFICE O/P NEW MOD 45 MIN: CPT | Performed by: INTERNAL MEDICINE

## 2021-06-14 ASSESSMENT — ENCOUNTER SYMPTOMS
PALPITATIONS: 0
SPEECH CHANGE: 0
MYALGIAS: 0
FALLS: 0
NECK PAIN: 0
DIZZINESS: 0
CHILLS: 0
TREMORS: 0
ORTHOPNEA: 0
BLURRED VISION: 0
HEARTBURN: 0
WEAKNESS: 0
DIARRHEA: 0
CLAUDICATION: 0
COUGH: 1
STRIDOR: 0
PND: 0
SHORTNESS OF BREATH: 1
WHEEZING: 0
ABDOMINAL PAIN: 0
VOMITING: 0
DEPRESSION: 0
NAUSEA: 0
CONSTIPATION: 0
PHOTOPHOBIA: 0
FEVER: 0
SINUS PAIN: 0
WEIGHT LOSS: 0
BACK PAIN: 0
EYE PAIN: 0
HEADACHES: 0
HEMOPTYSIS: 0
EYE REDNESS: 0
FOCAL WEAKNESS: 0
SPUTUM PRODUCTION: 1
DIAPHORESIS: 0
DOUBLE VISION: 0
EYE DISCHARGE: 0
SORE THROAT: 0

## 2021-06-14 ASSESSMENT — FIBROSIS 4 INDEX: FIB4 SCORE: 1.54

## 2021-06-14 NOTE — PROGRESS NOTES
Chief Complaint   Patient presents with   • Establish Care     referral 3/29/21 ELVIS Burgess PA-C DX COPD    • Results     CXR 3/2/21        HPI: This patient is a 75 y.o. male presenting for evaluation of shortness of breath and presumed COPD.  Patient's past medical history is notable only for adrenal mass which is being monitored observationally.  He also has cataracts which have been operated on the remote past.  He is a current tobacco user roughly 1/2 pack/day but has smoked up to 1 pack/day for period of 50 to 55 years.  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  Family history is notable for father who  from lung cancer at the age of 47 but had significant asbestos exposure.  He was also tobacco user.  Patient himself was complaining of shortness of breath to his primary care which had been progressive over months to a year.  He was started on Advair 100 for suspected COPD after no significant relief with albuterol.  Since being on Advair he reports significant improvement in his breathing.  He does have mild chronic cough with only occasional mucus production.  The patient had a CT chest from  of last year demonstrating right lower lobe 12 mm groundglass opacity.  This was actually a follow-up CT from abnormal imaging done in March during which there were multiple abnormalities all of which had resolved other than the right lower lobe opacity.  No follow-up imaging since that time.  He denies history of recurrent pneumonias or bronchitis.  No chest pain.  No edema.  He exercises on a regular basis.    Past Medical History:   Diagnosis Date   • Chronic obstructive pulmonary disease (HCC)        Social History     Socioeconomic History   • Marital status:      Spouse name: Not on file   • Number of children: Not on file   • Years of education: Not on file   • Highest education level: Not  on file   Occupational History   • Not on file   Tobacco Use   • Smoking status: Current Every Day Smoker     Packs/day: 0.50     Years: 60.00     Pack years: 30.00     Types: Cigarettes     Start date: 1959   • Smokeless tobacco: Never Used   • Tobacco comment: since age 14, no smoking for 24 hours   Vaping Use   • Vaping Use: Former   • Substances: Nicotine   • Devices: Disposable   • Passive vaping exposure Yes   Substance and Sexual Activity   • Alcohol use: No     Alcohol/week: 0.0 oz     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   • Drug use: No   • Sexual activity: Not Currently   Other Topics Concern   • Not on file   Social History Narrative   • Not on file     Social Determinants of Health     Financial Resource Strain:    • Difficulty of Paying Living Expenses:    Food Insecurity:    • Worried About Running Out of Food in the Last Year:    • Ran Out of Food in the Last Year:    Transportation Needs:    • Lack of Transportation (Medical):    • Lack of Transportation (Non-Medical):    Physical Activity:    • Days of Exercise per Week:    • Minutes of Exercise per Session:    Stress:    • Feeling of Stress :    Social Connections:    • Frequency of Communication with Friends and Family:    • Frequency of Social Gatherings with Friends and Family:    • Attends Buddhism Services:    • Active Member of Clubs or Organizations:    • Attends Club or Organization Meetings:    • Marital Status:    Intimate Partner Violence:    • Fear of Current or Ex-Partner:    • Emotionally Abused:    • Physically Abused:    • Sexually Abused:        Family History   Problem Relation Age of Onset   • Dementia Mother    • Lung Disease Father    • Cancer Father         Lung CA   • Dementia Sister    • Heart Disease Brother    • Hypertension Brother    • Dementia Sister         Alzheimer's   • Diabetes Neg Hx        Current Outpatient Medications on File Prior to Visit   Medication Sig Dispense Refill   • albuterol 108 (90 Base)  "MCG/ACT Aero Soln inhalation aerosol Inhale 2 Puffs by mouth every 6 hours as needed for Shortness of Breath. 8 g 11     No current facility-administered medications on file prior to visit.       Allergies: Patient has no known allergies.    ROS:   Review of Systems   Constitutional: Negative for chills, diaphoresis, fever, malaise/fatigue and weight loss.   HENT: Negative for congestion, ear discharge, ear pain, hearing loss, nosebleeds, sinus pain, sore throat and tinnitus.    Eyes: Negative for blurred vision, double vision, photophobia, pain, discharge and redness.   Respiratory: Positive for cough, sputum production and shortness of breath. Negative for hemoptysis, wheezing and stridor.    Cardiovascular: Negative for chest pain, palpitations, orthopnea, claudication, leg swelling and PND.   Gastrointestinal: Negative for abdominal pain, constipation, diarrhea, heartburn, nausea and vomiting.   Genitourinary: Negative for dysuria and urgency.   Musculoskeletal: Negative for back pain, falls, joint pain, myalgias and neck pain.   Skin: Negative for itching and rash.   Neurological: Negative for dizziness, tremors, speech change, focal weakness, weakness and headaches.   Endo/Heme/Allergies: Negative for environmental allergies.   Psychiatric/Behavioral: Negative for depression.       /64 (BP Location: Right arm, Patient Position: Sitting, BP Cuff Size: Adult)   Pulse 63   Temp 36.6 °C (97.9 °F) (Temporal)   Resp 16   Ht 1.676 m (5' 6\")   Wt 72.1 kg (159 lb)   SpO2 96%     Physical Exam:  Physical Exam  Vitals reviewed.   Constitutional:       General: He is not in acute distress.     Appearance: Normal appearance. He is normal weight.   HENT:      Head: Normocephalic and atraumatic.      Right Ear: External ear normal.      Left Ear: External ear normal.      Nose: Nose normal. No congestion.      Mouth/Throat:      Mouth: Mucous membranes are moist.      Pharynx: Oropharynx is clear. No " oropharyngeal exudate.   Eyes:      General: No scleral icterus.     Extraocular Movements: Extraocular movements intact.      Conjunctiva/sclera: Conjunctivae normal.      Pupils: Pupils are equal, round, and reactive to light.   Cardiovascular:      Rate and Rhythm: Normal rate and regular rhythm.      Heart sounds: Normal heart sounds. No murmur heard.   No gallop.    Pulmonary:      Effort: Pulmonary effort is normal. No respiratory distress.      Breath sounds: Normal breath sounds. No wheezing or rales.   Abdominal:      General: There is no distension.      Palpations: Abdomen is soft.   Musculoskeletal:         General: Normal range of motion.      Cervical back: Normal range of motion and neck supple.      Right lower leg: No edema.      Left lower leg: No edema.   Skin:     General: Skin is warm and dry.      Findings: No rash.   Neurological:      Mental Status: He is alert and oriented to person, place, and time.      Cranial Nerves: No cranial nerve deficit.   Psychiatric:         Mood and Affect: Mood normal.         Behavior: Behavior normal.         PFTs as reviewed by me personally: None    Imaging as reviewed by me personally: As per HPI    Assessment:  1. Chronic obstructive pulmonary disease, unspecified COPD type (HCC)  PULMONARY FUNCTION TESTS -Test requested: Complete Pulmonary Function Test    CT-CHEST ([facility]ORAX) W/O    fluticasone-salmeterol (ADVAIR) 100-50 MCG/DOSE AEROSOL POWDER, BREATH ACTIVATED   2. Tobacco use  PULMONARY FUNCTION TESTS -Test requested: Complete Pulmonary Function Test    CT-CHEST (THORAX) W/O   3. Lung nodule  PULMONARY FUNCTION TESTS -Test requested: Complete Pulmonary Function Test    CT-CHEST (THORAX) W/O       Plan:  1.  This is presumed based on tobacco history and shortness of breath that has improved with inhaled corticosteroid and long-acting beta agonist.  Given he has had significant improvement symptomatically I will not make any medication changes although we  may consider discontinuing inhaled corticosteroid and transitioning to long-acting beta agonist and long-acting anticholinergic.  For now continue Advair and obtain pulmonary function testing.  Patient was counseled on tobacco cessation.  He is up-to-date on vaccines.  2.  Patient has fairly extensive tobacco history and is high risk for pulmonary malignancy with abnormal CT chest 1 year ago.  I have ordered updated CT chest.  3.  See discussion above.  Abnormal imaging 1 year ago in high risk patient.  I have ordered repeat CT chest now.  Return in about 4 months (around 10/14/2021) for PFTs, CT chest .

## 2021-06-15 ENCOUNTER — OFFICE VISIT (OUTPATIENT)
Dept: MEDICAL GROUP | Facility: MEDICAL CENTER | Age: 76
End: 2021-06-15
Payer: MEDICARE

## 2021-06-15 ENCOUNTER — NON-PROVIDER VISIT (OUTPATIENT)
Dept: SLEEP MEDICINE | Facility: MEDICAL CENTER | Age: 76
End: 2021-06-15
Attending: INTERNAL MEDICINE
Payer: MEDICARE

## 2021-06-15 VITALS — WEIGHT: 158 LBS | BODY MASS INDEX: 26.98 KG/M2 | HEIGHT: 64 IN

## 2021-06-15 VITALS
OXYGEN SATURATION: 95 % | HEIGHT: 64 IN | HEART RATE: 72 BPM | TEMPERATURE: 98.2 F | BODY MASS INDEX: 27.33 KG/M2 | DIASTOLIC BLOOD PRESSURE: 60 MMHG | WEIGHT: 160.05 LBS | SYSTOLIC BLOOD PRESSURE: 112 MMHG

## 2021-06-15 DIAGNOSIS — R91.8 GROUND GLASS OPACITY PRESENT ON IMAGING OF LUNG: ICD-10-CM

## 2021-06-15 DIAGNOSIS — I25.10 ATHEROSCLEROSIS OF CORONARY ARTERY WITHOUT ANGINA PECTORIS, UNSPECIFIED VESSEL OR LESION TYPE, UNSPECIFIED WHETHER NATIVE OR TRANSPLANTED HEART: ICD-10-CM

## 2021-06-15 DIAGNOSIS — R91.1 LUNG NODULE: ICD-10-CM

## 2021-06-15 DIAGNOSIS — Z00.00 MEDICARE ANNUAL WELLNESS VISIT, SUBSEQUENT: ICD-10-CM

## 2021-06-15 DIAGNOSIS — E55.9 VITAMIN D DEFICIENCY: ICD-10-CM

## 2021-06-15 DIAGNOSIS — J43.2 CENTRILOBULAR EMPHYSEMA (HCC): ICD-10-CM

## 2021-06-15 DIAGNOSIS — E27.8 LEFT ADRENAL MASS (HCC): ICD-10-CM

## 2021-06-15 DIAGNOSIS — Z72.0 TOBACCO USE: ICD-10-CM

## 2021-06-15 DIAGNOSIS — L20.82 FLEXURAL ECZEMA: ICD-10-CM

## 2021-06-15 DIAGNOSIS — F17.200 TOBACCO DEPENDENCE: ICD-10-CM

## 2021-06-15 DIAGNOSIS — J44.9 CHRONIC OBSTRUCTIVE PULMONARY DISEASE, UNSPECIFIED COPD TYPE (HCC): ICD-10-CM

## 2021-06-15 PROBLEM — J44.1 COPD WITH ACUTE EXACERBATION (HCC): Status: RESOLVED | Noted: 2021-03-29 | Resolved: 2021-06-15

## 2021-06-15 PROCEDURE — 94726 PLETHYSMOGRAPHY LUNG VOLUMES: CPT | Performed by: INTERNAL MEDICINE

## 2021-06-15 PROCEDURE — G0439 PPPS, SUBSEQ VISIT: HCPCS | Performed by: PHYSICIAN ASSISTANT

## 2021-06-15 PROCEDURE — 94010 BREATHING CAPACITY TEST: CPT | Performed by: INTERNAL MEDICINE

## 2021-06-15 PROCEDURE — 94729 DIFFUSING CAPACITY: CPT | Performed by: INTERNAL MEDICINE

## 2021-06-15 ASSESSMENT — PULMONARY FUNCTION TESTS
FEV1/FVC: 43
FEV1/FVC_PERCENT_PREDICTED: 77
FEV1/FVC_PERCENT_PREDICTED: 57
FEV1: 1.33
FVC_PERCENT_PREDICTED: 91
FEV1_PREDICTED: 2.55
FVC_PREDICTED: 3.33
FEV1/FVC_PERCENT_LLN: 64
FVC: 3.06
FEV1/FVC: 43
FEV1_LLN: 2.13
FEV1/FVC_PERCENT_PREDICTED: 56
FEV1/FVC_PREDICTED: 77
FEV1_PERCENT_PREDICTED: 52
FVC_LLN: 2.78

## 2021-06-15 ASSESSMENT — ENCOUNTER SYMPTOMS: GENERAL WELL-BEING: FAIR

## 2021-06-15 ASSESSMENT — FIBROSIS 4 INDEX
FIB4 SCORE: 1.54
FIB4 SCORE: 1.54

## 2021-06-15 ASSESSMENT — ACTIVITIES OF DAILY LIVING (ADL): BATHING_REQUIRES_ASSISTANCE: 0

## 2021-06-15 NOTE — ASSESSMENT & PLAN NOTE
This is a pleasant 75-year-old male here today to have an annual months examination.  Doing well with his COPD.  Recently seen by pulmonology.  States that Advair has been very effective.  Rarely has to use his rescue inhaler.  Very happy with his prognosis.  Unfortunately unable to cut back on smoking.  Still smoking about half a pack a day.  History of known atherosclerotic heart disease on imaging.  Recent cholesterol profile showed an LDL at 116.  In the past he is declining initiation of statin or antiplatelet medication.  He is doing well with his eczema.  The left adrenal mass has been surveillance to few times and has been stable.  No new concerns.

## 2021-06-15 NOTE — PROGRESS NOTES
Annual Health Assessment Questions:    1.  Are you currently engaging in any exercise or physical activity? Yes    2.  How would you describe your mood or emotional well-being today? fair    3.  Have you had any falls in the last year? No    4.  Have you noticed any problems with your balance or had difficulty walking? No    5.  In the last six months have you experienced any leakage of urine? No    6. DPA/Advanced Directive: Patient has Living Will, but it is not on file. Instructed to bring in a copy to scan into their chart.

## 2021-06-15 NOTE — PROGRESS NOTES
Chief Complaint   Patient presents with   • Annual Exam       HPI:  This is a pleasant 75-year-old male here today to have an annual months examination.  Doing well with his COPD.  Recently seen by pulmonology.  States that Advair has been very effective.  Rarely has to use his rescue inhaler.  Very happy with his prognosis.  Unfortunately unable to cut back on smoking.  Still smoking about half a pack a day.  History of known atherosclerotic heart disease on imaging.  Recent cholesterol profile showed an LDL at 116.  In the past he is declining initiation of statin or antiplatelet medication.  He is doing well with his eczema.  The left adrenal mass has been surveillance to few times and has been stable.  No new concerns.    Patient Active Problem List    Diagnosis Date Noted   • Medicare annual wellness visit, subsequent 06/15/2021   • Atherosclerotic heart disease 12/17/2020   • Left adrenal mass (HCC) 04/16/2020   • Ground glass opacity present on imaging of lung 03/06/2020   • Flexural eczema 07/13/2017   • Tobacco dependence 07/13/2017   • Centrilobular emphysema (HCC) 07/13/2017       Current Outpatient Medications   Medication Sig Dispense Refill   • fluticasone-salmeterol (ADVAIR) 100-50 MCG/DOSE AEROSOL POWDER, BREATH ACTIVATED Inhale 1 Puff every 12 hours. May start after oral steroids. 60 Each 11   • albuterol 108 (90 Base) MCG/ACT Aero Soln inhalation aerosol Inhale 2 Puffs by mouth every 6 hours as needed for Shortness of Breath. 8 g 11     No current facility-administered medications for this visit.          Current supplements as per medication list.     Allergies: Patient has no known allergies.    Current social contact/activities: gym/swimming, family and home activities, gambling     He  reports that he has been smoking cigarettes. He started smoking about 62 years ago. He has a 30.00 pack-year smoking history. He has never used smokeless tobacco. He reports that he does not drink alcohol and  does not use drugs.  Ready to quit: Not Answered  Counseling given: Not Answered  Comment: since age 14, no smoking for 24 hours      DPA/Advanced Directive:  Patient has Living Will, but it is not on file. Instructed to bring in a copy to scan into their chart.    ROS:    Gait: Uses no assistive device  Ostomy: No  Other tubes: No  Amputations: No  Chronic oxygen use: No  Last eye exam: couple years ago  Wears hearing aids: No   : Denies any urinary leakage during the last 6 months    Screening:    Depression Screening    Little interest or pleasure in doing things?   No   Feeling down, depressed , or hopeless?  No  Trouble falling or staying asleep, or sleeping too much?   No  Feeling tired or having little energy?   No  Poor appetite or overeating?   No  Feeling bad about yourself - or that you are a failure or have let yourself or your family down?  No  Trouble concentrating on things, such as reading the newspaper or watching television?  No  Moving or speaking so slowly that other people could have noticed.  Or the opposite - being so fidgety or restless that you have been moving around a lot more than usual? No    Thoughts that you would be better off dead, or of hurting yourself?   No  Patient Health Questionnaire Score:  No    If depressive symptoms identified deferred to follow up visit unless specifically addressed in assessment and plan.    Interpretation of PHQ-9 Total Score   Score Severity   1-4 No Depression   5-9 Mild Depression   10-14 Moderate Depression   15-19 Moderately Severe Depression   20-27 Severe Depression      Screening for Cognitive Impairment    Three Minute Recall (captain, garden, picture)  /3    Pablo clock face with all 12 numbers and set the hands to show 5 past 8.  Yes    Cognitive concerns identified deferred for follow up unless specifically addressed in assessment and plan.    Fall Risk Assessment    Has the patient had two or more falls in the last year or any fall with  injury in the last year?  No    Safety Assessment    Throw rugs on floor.  Yes  Handrails on all stairs.  Yes  Good lighting in all hallways.  Yes  Difficulty hearing.  No  Patient counseled about all safety risks that were identified.    Functional Assessment ADLs    Are there any barriers preventing you from cooking for yourself or meeting nutritional needs?  No.    Are there any barriers preventing you from driving safely or obtaining transportation?  No.    Are there any barriers preventing you from using a telephone or calling for help?  No.    Are there any barriers preventing you from shopping?  No.    Are there any barriers preventing you from taking care of your own finances?  No.    Are there any barriers preventing you from managing your medications?  No.    Are there any barriers preventing you from showering, bathing or dressing yourself? No.    Are you currently engaging in any exercise or physical activity?  Yes.     What is your perception of your health?  Fair.    Health Maintenance Summary                LUNG CANCER SCREENING Overdue 6/9/2021      Done 6/9/2020 CT-CHEST (THORAX) W/O     Patient has more history with this topic...    Annual Pulmonary Function Test / Spirometry Next Due 6/15/2022      Done 6/15/2021 PULMONARY FUNCTION TESTS    Annual Wellness Visit Next Due 6/16/2022      Done 6/15/2021 Prob Dx: Medicare annual wellness visit, subsequent     Patient has more history with this topic...    COLONOSCOPY Next Due 11/19/2022      Done 11/19/2012 REFERRAL TO GI FOR COLONOSCOPY     Patient has more history with this topic...    IMM DTaP/Tdap/Td Vaccine Next Due 1/15/2029      Done 1/15/2019 Imm Admin: Tdap Vaccine     Patient has more history with this topic...          Patient Care Team:  Nate Burgess P.A.-C. as PCP - General (Family Medicine)      Social History     Tobacco Use   • Smoking status: Current Every Day Smoker     Packs/day: 0.50     Years: 60.00     Pack years: 30.00      "Types: Cigarettes     Start date: 1959   • Smokeless tobacco: Never Used   • Tobacco comment: since age 14, no smoking for 24 hours   Vaping Use   • Vaping Use: Former   • Substances: Nicotine   • Devices: Disposable   • Passive vaping exposure Yes   Substance Use Topics   • Alcohol use: No     Alcohol/week: 0.0 oz     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   • Drug use: No     Family History   Problem Relation Age of Onset   • Dementia Mother    • Lung Disease Father    • Cancer Father         Lung CA   • Dementia Sister    • Heart Disease Brother    • Hypertension Brother    • Dementia Sister         Alzheimer's   • Diabetes Neg Hx      He  has a past medical history of Chronic obstructive pulmonary disease (HCC).   History reviewed. No pertinent surgical history.    Exam:   /60   Pulse 72   Temp 36.8 °C (98.2 °F) (Temporal)   Ht 1.626 m (5' 4\")   Wt 72.6 kg (160 lb 0.9 oz)   SpO2 95%  Body mass index is 27.47 kg/m².    Hearing good.    Dentition poor  Alert, oriented in no acute distress.  Eye contact is good, speech goal directed, affect calm    Assessment and Plan. The following treatment and monitoring plan is recommended:    1. Centrilobular emphysema (HCC)    2. Tobacco dependence    3. Atherosclerosis of coronary artery without angina pectoris, unspecified vessel or lesion type, unspecified whether native or transplanted heart    4. Flexural eczema    5. Medicare annual wellness visit, subsequent    6. Vitamin D deficiency    7. Left adrenal mass (HCC)    8. Ground glass opacity present on imaging of lung    Discussed his history of developing CAD.  He is not interested in starting any medication.  Does not want any further testing.  He does exercise routinely.  Discussed imaging appointments for surveillance CT of his chest.  Advised to contact imaging if not contacted in the next week.    Services suggested: No services needed at this time  Health Care Screening: Age-appropriate preventive " services recommended by USPTF and ACIP covered by Medicare were discussed today. Services ordered if indicated and agreed upon by the patient.  Referrals offered: Community-based lifestyle interventions to reduce health risks and promote self-management and wellness, fall prevention, nutrition, physical activity, tobacco-use cessation, weight loss, and mental health services as per orders if indicated.    Discussion today about general wellness and lifestyle habits:    · Prevent falls and reduce trip hazards; Cautioned about securing or removing rugs.  · Have a working fire alarm and carbon monoxide detector;   · Engage in regular physical activity and social activities     Follow-up: Return in about 6 months (around 12/15/2021), or if symptoms worsen or fail to improve.

## 2021-06-15 NOTE — PROCEDURES
Tech: Sharon Segura, RRT, CPFT  Tech notes: Good patient effort & cooperation.  The results of this test meet the ATS/ERS standards for acceptability & reproducibility.  Test was performed on the Pay4later Body Plethysmograph-Elite DX system.  Predicted values were GLI-2012 for spirometry, GLI- 2017 for DLCO, ITS for Lung Volumes.  The DLCO was uncorrected for Hgb.    Interpretation:  Moderately severe COPD, FEV1 1.33 L or 52%.  Hyperinflation and air trapping consistent with obstructive lung disease.    Normal gas transfer.  Bronchodilators were not provided.

## 2021-07-17 ENCOUNTER — PATIENT MESSAGE (OUTPATIENT)
Dept: HEALTH INFORMATION MANAGEMENT | Facility: OTHER | Age: 76
End: 2021-07-17

## 2021-09-15 ENCOUNTER — HOSPITAL ENCOUNTER (OUTPATIENT)
Dept: RADIOLOGY | Facility: MEDICAL CENTER | Age: 76
End: 2021-09-15
Attending: INTERNAL MEDICINE
Payer: MEDICARE

## 2021-09-15 DIAGNOSIS — Z72.0 TOBACCO USE: ICD-10-CM

## 2021-09-15 DIAGNOSIS — R91.1 LUNG NODULE: ICD-10-CM

## 2021-09-15 DIAGNOSIS — J44.9 CHRONIC OBSTRUCTIVE PULMONARY DISEASE, UNSPECIFIED COPD TYPE (HCC): ICD-10-CM

## 2021-09-15 PROCEDURE — 71250 CT THORAX DX C-: CPT | Mod: ME

## 2021-10-14 ENCOUNTER — APPOINTMENT (OUTPATIENT)
Dept: SLEEP MEDICINE | Facility: MEDICAL CENTER | Age: 76
End: 2021-10-14
Payer: MEDICARE

## 2021-10-25 ENCOUNTER — OFFICE VISIT (OUTPATIENT)
Dept: MEDICAL GROUP | Facility: MEDICAL CENTER | Age: 76
End: 2021-10-25
Payer: MEDICARE

## 2021-10-25 VITALS
SYSTOLIC BLOOD PRESSURE: 109 MMHG | DIASTOLIC BLOOD PRESSURE: 60 MMHG | RESPIRATION RATE: 16 BRPM | WEIGHT: 149.47 LBS | BODY MASS INDEX: 25.52 KG/M2 | HEIGHT: 64 IN | OXYGEN SATURATION: 97 % | TEMPERATURE: 97.3 F | HEART RATE: 55 BPM

## 2021-10-25 DIAGNOSIS — M79.672 LEFT FOOT PAIN: ICD-10-CM

## 2021-10-25 DIAGNOSIS — J43.2 CENTRILOBULAR EMPHYSEMA (HCC): ICD-10-CM

## 2021-10-25 DIAGNOSIS — I25.10 ATHEROSCLEROSIS OF CORONARY ARTERY WITHOUT ANGINA PECTORIS, UNSPECIFIED VESSEL OR LESION TYPE, UNSPECIFIED WHETHER NATIVE OR TRANSPLANTED HEART: ICD-10-CM

## 2021-10-25 PROCEDURE — 99214 OFFICE O/P EST MOD 30 MIN: CPT | Performed by: PHYSICIAN ASSISTANT

## 2021-10-25 ASSESSMENT — FIBROSIS 4 INDEX: FIB4 SCORE: 1.56

## 2021-10-25 NOTE — PROGRESS NOTES
"Subjective:   Jaskaran Le is a 76 y.o. male here today for emphysema, left foot pain and atherosclerosis.    Centrilobular emphysema (HCC)  This is a pleasant 76-year-old male here today to follow-up on his health.  Chronic history of emphysema.  Use of Advair has been effective.  Does not need to use his rescue inhaler.  Unable to stop smoking.  Smoking about 1/2 pack daily.  Has an appointment in February with pulmonology.    Left foot pain  Chronic history of intermittent pain at the bottom of his left foot.  Past it was more of an ulcer.  This time will have pain intermittently.  Usually when walking.  I switched his shoes.    Atherosclerotic heart disease  MRI in the past showed atherosclerotic disease.  He has refused any intervention with cardiology or initiation of medication such as a statin.  I also offered him CT cardiac scoring.  He is happy he recently turned 76.  Believes he is on house money.       Current medicines (including changes today)  Current Outpatient Medications   Medication Sig Dispense Refill   • ADVAIR DISKUS 100-50 MCG/DOSE AEROSOL POWDER, BREATH ACTIVATED INHALE 1 DOSE BY MOUTH EVERY 12 HOURS. MAY START AFTER ORAL STEROIDS 200 Each 1   • albuterol 108 (90 Base) MCG/ACT Aero Soln inhalation aerosol Inhale 2 Puffs by mouth every 6 hours as needed for Shortness of Breath. 8 g 11     No current facility-administered medications for this visit.     He  has a past medical history of Chronic obstructive pulmonary disease (HCC).    Social History and Family History were reviewed and updated.    ROS   No chest pain, no shortness of breath, no abdominal pain and all other systems were reviewed and are negative.       Objective:     /60   Pulse (!) 55   Temp 36.3 °C (97.3 °F)   Resp 16   Ht 1.626 m (5' 4.02\")   Wt 67.8 kg (149 lb 7.6 oz)   SpO2 97%  Body mass index is 25.64 kg/m².   Physical Exam:  Constitutional: Alert, no distress.  Skin: Warm, dry, good turgor, no " rashes in visible areas.  Eye: Equal, round and reactive, conjunctiva clear, lids normal.  ENMT: Lips without lesions, good dentition, oropharynx clear.  Neck: Trachea midline, no masses.   Lymph: No cervical or supraclavicular lymphadenopathy  Respiratory: Unlabored respiratory effort, lungs clear, no wheezes.  Cardiovascular: Normal S1, S2, no edema.  Psych: Alert and oriented x3, normal affect and mood.        Assessment and Plan:   The following treatment plan was discussed    1. Centrilobular emphysema (HCC)  Chronic condition.  Stable.  Continues to smoke.  Follow with pulmonology.  Continue Advair as directed.    2. Left foot pain  Acute, new onset condition.  Longstanding issues with similar discomfort resulting in breakdown of the skin previously.  At this time the foot appears without any significant ulceration.  There is some tenderness on the bottom of the foot.  Offered referral to podiatry but he declined.  May continue current use of shoe.  Contact me through [facility] with any concerns.    3. Atherosclerosis of coronary artery without angina pectoris, unspecified vessel or lesion type, unspecified whether native or transplanted heart  Chronic condition.  Stable.  Declines any intervention.  Declines medication.         Followup: Return in about 6 months (around 4/25/2022), or if symptoms worsen or fail to improve.    Please note that this dictation was created using voice recognition software. I have made every reasonable attempt to correct obvious errors, but I expect that there are errors of grammar and possibly content that I did not discover before finalizing the note.

## 2021-10-25 NOTE — ASSESSMENT & PLAN NOTE
Chronic history of intermittent pain at the bottom of his left foot.  Past it was more of an ulcer.  This time will have pain intermittently.  Usually when walking.  I switched his shoes.

## 2021-10-25 NOTE — ASSESSMENT & PLAN NOTE
MRI in the past showed atherosclerotic disease.  He has refused any intervention with cardiology or initiation of medication such as a statin.  I also offered him CT cardiac scoring.  He is happy he recently turned 76.  Believes he is on house money.

## 2021-10-25 NOTE — ASSESSMENT & PLAN NOTE
This is a pleasant 76-year-old male here today to follow-up on his health.  Chronic history of emphysema.  Use of Advair has been effective.  Does not need to use his rescue inhaler.  Unable to stop smoking.  Smoking about 1/2 pack daily.  Has an appointment in February with pulmonology.

## 2021-12-08 ENCOUNTER — PATIENT MESSAGE (OUTPATIENT)
Dept: HEALTH INFORMATION MANAGEMENT | Facility: OTHER | Age: 76
End: 2021-12-08

## 2021-12-21 ENCOUNTER — TELEPHONE (OUTPATIENT)
Dept: HEALTH INFORMATION MANAGEMENT | Facility: OTHER | Age: 76
End: 2021-12-21

## 2021-12-21 ENCOUNTER — OFFICE VISIT (OUTPATIENT)
Dept: MEDICAL GROUP | Facility: MEDICAL CENTER | Age: 76
End: 2021-12-21
Payer: MEDICARE

## 2021-12-21 VITALS
BODY MASS INDEX: 23.71 KG/M2 | WEIGHT: 138.89 LBS | HEIGHT: 64 IN | RESPIRATION RATE: 14 BRPM | SYSTOLIC BLOOD PRESSURE: 118 MMHG | TEMPERATURE: 97.5 F | DIASTOLIC BLOOD PRESSURE: 62 MMHG | OXYGEN SATURATION: 97 % | HEART RATE: 65 BPM

## 2021-12-21 DIAGNOSIS — B07.9 WART OF FACE: ICD-10-CM

## 2021-12-21 PROCEDURE — 99213 OFFICE O/P EST LOW 20 MIN: CPT | Performed by: FAMILY MEDICINE

## 2021-12-21 ASSESSMENT — FIBROSIS 4 INDEX: FIB4 SCORE: 1.56

## 2021-12-21 ASSESSMENT — ENCOUNTER SYMPTOMS
FEVER: 0
CHILLS: 0

## 2021-12-21 NOTE — PROGRESS NOTES
"FAMILY MEDICINE VISIT                                                               Chief complaint::The encounter diagnosis was Wart of face.    History of present illness: Jaskaran Le is a 76 y.o. male who presented for wart of face.    He has skin lesion over upper lip area since few months. He reports that he removed this multiple times during shave but this lesion reoccurs. Denies any pain at that area. Reports that it is very difficult for him to shave and would like to get this removed.      Review of systems:     Review of Systems   Constitutional: Negative for chills and fever.   Skin:        Skin lesion of face        Medications and Allergies:     Current Outpatient Medications   Medication Sig Dispense Refill   • Salicylic Acid 17 % Kit Apply 1 Application topically 2 times a day. 1 Kit 1   • ADVAIR DISKUS 100-50 MCG/DOSE AEROSOL POWDER, BREATH ACTIVATED INHALE 1 DOSE BY MOUTH EVERY 12 HOURS. MAY START AFTER ORAL STEROIDS 200 Each 1   • albuterol 108 (90 Base) MCG/ACT Aero Soln inhalation aerosol Inhale 2 Puffs by mouth every 6 hours as needed for Shortness of Breath. 8 g 11     No current facility-administered medications for this visit.          Vitals:    /62 (BP Location: Left arm, Patient Position: Sitting, BP Cuff Size: Adult)   Pulse 65   Temp 36.4 °C (97.5 °F) (Temporal)   Resp 14   Ht 1.626 m (5' 4\")   Wt 63 kg (138 lb 14.2 oz)   SpO2 97%  Body mass index is 23.84 kg/m².    Physical Exam:     Physical Exam  Constitutional:       General: He is not in acute distress.  HENT:      Head: Normocephalic and atraumatic.   Eyes:      Conjunctiva/sclera: Conjunctivae normal.   Cardiovascular:      Rate and Rhythm: Normal rate.   Pulmonary:      Effort: Pulmonary effort is normal. No respiratory distress.   Musculoskeletal:         General: No deformity.      Cervical back: Neck supple.   Skin:     Comments: Papular nonerythematous round scaly lesion present over left side above upper " lip.   Neurological:      Mental Status: He is alert.      Gait: Gait is intact.   Psychiatric:         Mood and Affect: Mood and affect normal.         Judgment: Judgment normal.            Assessment/Plan:    1. Wart of face  New health problem, not getting better and getting worse and reoccurring. Referral to dermatology for removal of this wart. Prescribed salicylic acid in the meantime to be applied 1 application twice daily.    - Referral to Dermatology  - Salicylic Acid 17 % Kit; Apply 1 Application topically 2 times a day.  Dispense: 1 Kit; Refill: 1    Please note that this dictation was created using voice recognition software. I have made every reasonable attempt to correct obvious errors, but I expect that there are errors of grammar and possibly content that I did not discover before finalizing the note.    Follow up as needed.

## 2022-02-21 ENCOUNTER — PATIENT MESSAGE (OUTPATIENT)
Dept: HEALTH INFORMATION MANAGEMENT | Facility: OTHER | Age: 77
End: 2022-02-21
Payer: MEDICARE

## 2022-03-25 ENCOUNTER — APPOINTMENT (OUTPATIENT)
Dept: MEDICAL GROUP | Facility: MEDICAL CENTER | Age: 77
End: 2022-03-25
Payer: MEDICARE

## 2022-03-30 ENCOUNTER — OFFICE VISIT (OUTPATIENT)
Dept: MEDICAL GROUP | Facility: MEDICAL CENTER | Age: 77
End: 2022-03-30
Payer: MEDICARE

## 2022-03-30 VITALS
OXYGEN SATURATION: 94 % | HEIGHT: 64 IN | DIASTOLIC BLOOD PRESSURE: 56 MMHG | HEART RATE: 79 BPM | WEIGHT: 150 LBS | SYSTOLIC BLOOD PRESSURE: 98 MMHG | BODY MASS INDEX: 25.61 KG/M2 | TEMPERATURE: 98.7 F

## 2022-03-30 DIAGNOSIS — B07.9 WART OF FACE: ICD-10-CM

## 2022-03-30 DIAGNOSIS — J43.2 CENTRILOBULAR EMPHYSEMA (HCC): ICD-10-CM

## 2022-03-30 DIAGNOSIS — E27.8 LEFT ADRENAL MASS (HCC): ICD-10-CM

## 2022-03-30 DIAGNOSIS — I70.0 ATHEROSCLEROSIS OF AORTA (HCC): ICD-10-CM

## 2022-03-30 PROBLEM — R91.8 GROUND GLASS OPACITY PRESENT ON IMAGING OF LUNG: Status: RESOLVED | Noted: 2020-03-06 | Resolved: 2022-03-30

## 2022-03-30 PROCEDURE — 99214 OFFICE O/P EST MOD 30 MIN: CPT | Performed by: PHYSICIAN ASSISTANT

## 2022-03-30 ASSESSMENT — FIBROSIS 4 INDEX: FIB4 SCORE: 1.56

## 2022-03-30 ASSESSMENT — PATIENT HEALTH QUESTIONNAIRE - PHQ9: CLINICAL INTERPRETATION OF PHQ2 SCORE: 0

## 2022-03-30 NOTE — ASSESSMENT & PLAN NOTE
Was seen at the end of December for a wart on his upper lip.  Has been using over-the-counter salicylic acid once or twice a day.  States the lesion has shrunk.

## 2022-03-30 NOTE — ASSESSMENT & PLAN NOTE
This is a pleasant 76-year-old male here today to follow-up on his health.  Chronic history of emphysema.  Does have an appointment with pulmonology in the near future.  States his breathing is good.  Requesting a refill the day of Advair.  Would like it sent to [facility].  Medication is effective.  Does not use rescue inhaler.  Continues to smoke but does not feel compelled to quit.  Did stop alcohol abuse years ago.  Finds it difficult to stop smoking.

## 2022-03-30 NOTE — ASSESSMENT & PLAN NOTE
Chronic condition.  Discussed today that he has a 20% chance of having a stroke or heart attack.  He has been aware of this in the past.  He does not want to start a statin.

## 2022-03-30 NOTE — PROGRESS NOTES
Subjective:   Jaskaran Le is a 76 y.o. male here today for emphysema, left adrenal mass, there is sclerosis and wart of face.    Centrilobular emphysema (HCC)  This is a pleasant 76-year-old male here today to follow-up on his health.  Chronic history of emphysema.  Does have an appointment with pulmonology in the near future.  States his breathing is good.  Requesting a refill the day of Advair.  Would like it sent to [facility].  Medication is effective.  Does not use rescue inhaler.  Continues to smoke but does not feel compelled to quit.  Did stop alcohol abuse years ago.  Finds it difficult to stop smoking.    Left adrenal mass (HCC)  Chronic condition.  Last imaging showed stable lesion.  No follow-up needed.    Atherosclerosis of aorta (HCC)  Chronic condition.  Discussed today that he has a 20% chance of having a stroke or heart attack.  He has been aware of this in the past.  He does not want to start a statin.    Wart of face  Was seen at the end of December for a wart on his upper lip.  Has been using over-the-counter salicylic acid once or twice a day.  States the lesion has shrunk.       Current medicines (including changes today)  Current Outpatient Medications   Medication Sig Dispense Refill   • ADVAIR DISKUS 100-50 MCG/DOSE AEROSOL POWDER, BREATH ACTIVATED INHALE 1 DOSE BY MOUTH EVERY 12 HOURS. MAY START AFTER ORAL STEROIDS 200 Each 1   • betamethasone valerate (VALISONE) 0.1 % Cream Apply 1 Application topically 2 times a day. 45 g 3   • salicylic acid 17 % gel Apply 1 Application topically 2 times a day. 14 g 2   • albuterol 108 (90 Base) MCG/ACT Aero Soln inhalation aerosol Inhale 2 Puffs by mouth every 6 hours as needed for Shortness of Breath. 8 g 11     No current facility-administered medications for this visit.     He  has a past medical history of Chronic obstructive pulmonary disease (HCC).    Social History and Family History were reviewed and updated.    ROS   No chest pain, no  "shortness of breath, no abdominal pain and all other systems were reviewed and are negative.       Objective:     BP (!) 98/56 (BP Location: Right arm, Patient Position: Sitting, BP Cuff Size: Adult)   Pulse 79   Temp 37.1 °C (98.7 °F) (Temporal)   Ht 1.626 m (5' 4\")   Wt 68 kg (150 lb)   SpO2 94%  Body mass index is 25.75 kg/m².   Physical Exam:  Constitutional: Alert, no distress.  Skin: Warm, dry, good turgor, no rashes in visible areas.  Upper lip still with a large circular raised lesion.  Lesion measures approximately 1 cm.  Eye: Equal, round and reactive, conjunctiva clear, lids normal.  ENMT: Lips without lesions, good dentition, oropharynx clear.  Neck: Trachea midline, no masses.   Lymph: No cervical or supraclavicular lymphadenopathy  Respiratory: Unlabored respiratory effort, lungs appear clear, no wheezes.  Cardiovascular: Regular rate and rhythm.  Psych: Alert and oriented x3, normal affect and mood.        Assessment and Plan:   The following treatment plan was discussed    1. Centrilobular emphysema (HCC)  Chronic condition.  Stable.  Continue to follow with pulmonology.  Renewed Advair as directed.  - ADVAIR DISKUS 100-50 MCG/DOSE AEROSOL POWDER, BREATH ACTIVATED; INHALE 1 DOSE BY MOUTH EVERY 12 HOURS. MAY START AFTER ORAL STEROIDS  Dispense: 200 Each; Refill: 1    2. Wart of face  Chronic condition.  Did not see the lesion initially.  May continue salicylic acid.  Referred to dermatology for further evaluation.  - Referral to Dermatology    3. Atherosclerosis of aorta (HCC)  Chronic condition.  Discuss need for statin.  He declined.  He understands his cardiac risk.    4. Left adrenal mass (HCC)  Chronic condition.  Stable.  Imaging has been stable for several years.         Followup: Return in about 6 months (around 9/30/2022), or if symptoms worsen or fail to improve.    Please note that this dictation was created using voice recognition software. I have made every reasonable attempt to " correct obvious errors, but I expect that there are errors of grammar and possibly content that I did not discover before finalizing the note.

## 2022-04-04 DIAGNOSIS — J43.2 CENTRILOBULAR EMPHYSEMA (HCC): ICD-10-CM

## 2022-05-04 ENCOUNTER — OFFICE VISIT (OUTPATIENT)
Dept: SLEEP MEDICINE | Facility: MEDICAL CENTER | Age: 77
End: 2022-05-04
Payer: MEDICARE

## 2022-05-04 VITALS
HEART RATE: 71 BPM | SYSTOLIC BLOOD PRESSURE: 124 MMHG | OXYGEN SATURATION: 96 % | BODY MASS INDEX: 25.85 KG/M2 | WEIGHT: 151.4 LBS | DIASTOLIC BLOOD PRESSURE: 70 MMHG | HEIGHT: 64 IN

## 2022-05-04 DIAGNOSIS — R91.1 LUNG NODULE: ICD-10-CM

## 2022-05-04 DIAGNOSIS — J44.9 CHRONIC OBSTRUCTIVE PULMONARY DISEASE, UNSPECIFIED COPD TYPE (HCC): ICD-10-CM

## 2022-05-04 DIAGNOSIS — Z72.0 TOBACCO USE: ICD-10-CM

## 2022-05-04 DIAGNOSIS — R91.1 SOLITARY PULMONARY NODULE: ICD-10-CM

## 2022-05-04 PROCEDURE — 99214 OFFICE O/P EST MOD 30 MIN: CPT | Performed by: INTERNAL MEDICINE

## 2022-05-04 ASSESSMENT — ENCOUNTER SYMPTOMS
EYE DISCHARGE: 0
CONSTIPATION: 0
DIAPHORESIS: 0
FALLS: 0
DIARRHEA: 0
COUGH: 0
NAUSEA: 0
ORTHOPNEA: 0
ABDOMINAL PAIN: 0
PHOTOPHOBIA: 0
CLAUDICATION: 0
BLURRED VISION: 0
MYALGIAS: 0
SPEECH CHANGE: 0
EYE PAIN: 0
SINUS PAIN: 0
SORE THROAT: 0
SPUTUM PRODUCTION: 0
PALPITATIONS: 0
DOUBLE VISION: 0
PND: 0
FOCAL WEAKNESS: 0
WEIGHT LOSS: 0
HEADACHES: 0
NECK PAIN: 0
EYE REDNESS: 0
CHILLS: 0
DIZZINESS: 0
WHEEZING: 0
FEVER: 0
HEMOPTYSIS: 0
WEAKNESS: 0
SHORTNESS OF BREATH: 0
DEPRESSION: 0
TREMORS: 0
BACK PAIN: 0
HEARTBURN: 0
STRIDOR: 0
VOMITING: 0

## 2022-05-04 ASSESSMENT — FIBROSIS 4 INDEX: FIB4 SCORE: 1.56

## 2022-05-04 NOTE — PROGRESS NOTES
Chief Complaint   Patient presents with   • COPD     Last seen 6/14/21   • Results     Pft 6/15/21, ct chest thorax 9/15/21         HPI: This patient is a 76 y.o. male whom is followed in our clinic for COPD and lung nodule last seen by me on 6/14/21. Patient's past medical history is notable only for adrenal mass which is being monitored observationally.  He also has cataracts which have been operated on the remote past.  He is a current tobacco user roughly 1/2 pack/day but has smoked up to 1 pack/day for period of 50 to 55 years.  He is retired from work for the [employer] of [employer].  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 Pt was started on advair for SOB prior to our first visit which decreased need for albuterol and controlled sxs. PFTs since our first visit confirmed moderate obstructive lung disease with FEV1 of 52% pred, mild air trapping, normal TLC and low normal DLCO. Functionally he is MMRC 1.  The patient had a CT chest from June of 2020 demonstrating right lower lobe 12 mm groundglass opacity.  This was actually a follow-up CT from abnormal imaging done in March of 2020 during which there were multiple abnormalities all of which had resolved other than the right lower lobe opacity. We ordered a f/u CT done in September which showed increase in RLL GGO with solid component to max 15 mm in diam. I spoke with pt following the study and he declined PET or attempt to biopsy. He is willing to continue surveillance imaging and consider XRT if nodule continues to grow but declines invasive procedures such as biopsy or surgical resection. He also has no interest in chemo. He feels well today. No acute complaints.     Past Medical History:   Diagnosis Date   • Chronic obstructive pulmonary disease (HCC)        Social History     Socioeconomic History   • Marital status:      Spouse name: Not on file   • Number of children: Not on file   • Years of  education: Not on file   • Highest education level: Not on file   Occupational History   • Not on file   Tobacco Use   • Smoking status: Current Every Day Smoker     Packs/day: 0.50     Years: 60.00     Pack years: 30.00     Types: Cigarettes     Start date: 1959   • Smokeless tobacco: Never Used   • Tobacco comment: since age 14, no smoking for 24 hours   Vaping Use   • Vaping Use: Former   • Substances: Nicotine   • Devices: Disposable   • Passive vaping exposure: Yes   Substance and Sexual Activity   • Alcohol use: No     Alcohol/week: 0.0 oz     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   • Drug use: No   • Sexual activity: Not Currently   Other Topics Concern   • Not on file   Social History Narrative   • Not on file     Social Determinants of Health     Financial Resource Strain: Not on file   Food Insecurity: Not on file   Transportation Needs: Not on file   Physical Activity: Not on file   Stress: Not on file   Social Connections: Not on file   Intimate Partner Violence: Not on file   Housing Stability: Not on file       Family History   Problem Relation Age of Onset   • Dementia Mother    • Lung Disease Father    • Cancer Father         Lung CA   • Dementia Sister    • Heart Disease Brother    • Hypertension Brother    • Dementia Sister         Alzheimer's   • Diabetes Neg Hx        Current Outpatient Medications on File Prior to Visit   Medication Sig Dispense Refill   • ADVAIR DISKUS 100-50 MCG/DOSE AEROSOL POWDER, BREATH ACTIVATED INHALE 1 DOSE BY MOUTH EVERY 12 HOURS. MAY START AFTER ORAL STEROIDS 200 Each 1   • betamethasone valerate (VALISONE) 0.1 % Cream Apply 1 Application topically 2 times a day. 45 g 3   • salicylic acid 17 % gel Apply 1 Application topically 2 times a day. 14 g 2   • albuterol 108 (90 Base) MCG/ACT Aero Soln inhalation aerosol Inhale 2 Puffs by mouth every 6 hours as needed for Shortness of Breath. 8 g 11     No current facility-administered medications on file prior to visit.  "      Patient has no known allergies.      ROS:   Review of Systems   Constitutional: Negative for chills, diaphoresis, fever, malaise/fatigue and weight loss.   HENT: Negative for congestion, ear discharge, ear pain, hearing loss, nosebleeds, sinus pain, sore throat and tinnitus.    Eyes: Negative for blurred vision, double vision, photophobia, pain, discharge and redness.   Respiratory: Negative for cough, hemoptysis, sputum production, shortness of breath, wheezing and stridor.    Cardiovascular: Negative for chest pain, palpitations, orthopnea, claudication, leg swelling and PND.   Gastrointestinal: Negative for abdominal pain, constipation, diarrhea, heartburn, nausea and vomiting.   Genitourinary: Negative for dysuria and urgency.   Musculoskeletal: Negative for back pain, falls, joint pain, myalgias and neck pain.   Skin: Negative for itching and rash.   Neurological: Negative for dizziness, tremors, speech change, focal weakness, weakness and headaches.   Endo/Heme/Allergies: Negative for environmental allergies.   Psychiatric/Behavioral: Negative for depression.       /70 (BP Location: Right arm, Patient Position: Sitting, BP Cuff Size: Adult)   Pulse 71   Ht 1.626 m (5' 4\")   Wt 68.7 kg (151 lb 6.4 oz)   SpO2 96%   Physical Exam  Vitals reviewed.   Constitutional:       General: He is not in acute distress.     Appearance: Normal appearance. He is normal weight.   HENT:      Head: Normocephalic and atraumatic.      Right Ear: External ear normal.      Left Ear: External ear normal.      Nose: Nose normal. No congestion.      Mouth/Throat:      Mouth: Mucous membranes are moist.      Pharynx: Oropharynx is clear. No oropharyngeal exudate.   Eyes:      General: No scleral icterus.     Extraocular Movements: Extraocular movements intact.      Conjunctiva/sclera: Conjunctivae normal.      Pupils: Pupils are equal, round, and reactive to light.   Cardiovascular:      Rate and Rhythm: Normal rate and " regular rhythm.      Heart sounds: Normal heart sounds. No murmur heard.    No gallop.   Pulmonary:      Effort: Pulmonary effort is normal. No respiratory distress.      Breath sounds: Normal breath sounds. No wheezing or rales.   Abdominal:      General: There is no distension.      Palpations: Abdomen is soft.   Musculoskeletal:         General: Normal range of motion.      Cervical back: Normal range of motion and neck supple.      Right lower leg: No edema.      Left lower leg: No edema.   Skin:     General: Skin is warm and dry.      Findings: No rash.   Neurological:      Mental Status: He is alert and oriented to person, place, and time.      Cranial Nerves: No cranial nerve deficit.   Psychiatric:         Mood and Affect: Mood normal.         Behavior: Behavior normal.         PFTs as reviewed by me personally: as per hPI    Imaging as reviewed by me personally:  As per hpI    Assessment:  1. Chronic obstructive pulmonary disease, unspecified COPD type (HCC)  CT-CHEST (THORAX) W/O   2. Lung nodule  CT-CHEST (THORAX) W/O   3. Solitary pulmonary nodule  CT-CHEST (THORAX) W/O   4. Tobacco use         Plan:  1. New dx but sxs are well controlled on advair. We will continue this. He is up to date on vaccines, was counseled on tobacco cessation. F/u 6 mos  2. This has grown in size and is concerning for malignancy. See discussion in HPI. We will repeat CT in September, if nodule continues to grow, we will discuss XRT as pt does not desire biopsy  3. Duplicate dx; see above  4. Pt counseled on tobacco cessation  Return in about 6 months (around 11/4/2022) for CT chest in September or October .

## 2022-05-23 ENCOUNTER — OFFICE VISIT (OUTPATIENT)
Dept: URGENT CARE | Facility: PHYSICIAN GROUP | Age: 77
End: 2022-05-23
Payer: MEDICARE

## 2022-05-23 VITALS
DIASTOLIC BLOOD PRESSURE: 54 MMHG | HEIGHT: 66 IN | RESPIRATION RATE: 18 BRPM | HEART RATE: 82 BPM | WEIGHT: 150 LBS | SYSTOLIC BLOOD PRESSURE: 108 MMHG | OXYGEN SATURATION: 95 % | BODY MASS INDEX: 24.11 KG/M2 | TEMPERATURE: 98.6 F

## 2022-05-23 DIAGNOSIS — H04.201 WATERING OF RIGHT EYE: ICD-10-CM

## 2022-05-23 DIAGNOSIS — H53.8 BLURRED VISION, RIGHT EYE: ICD-10-CM

## 2022-05-23 DIAGNOSIS — S05.01XA CORNEA ABRASION, RIGHT, INITIAL ENCOUNTER: ICD-10-CM

## 2022-05-23 PROCEDURE — 99214 OFFICE O/P EST MOD 30 MIN: CPT

## 2022-05-23 RX ORDER — SULFACETAMIDE SODIUM 100 MG/ML
1 SOLUTION/ DROPS OPHTHALMIC EVERY 4 HOURS
Qty: 5 ML | Refills: 0 | Status: SHIPPED | OUTPATIENT
Start: 2022-05-23 | End: 2022-05-30

## 2022-05-23 ASSESSMENT — ENCOUNTER SYMPTOMS
VOMITING: 0
CHILLS: 0
DIZZINESS: 0
PHOTOPHOBIA: 0
EYE DISCHARGE: 0
HEADACHES: 0
FEVER: 0
BLURRED VISION: 1
EYE REDNESS: 0
RESPIRATORY NEGATIVE: 1
DOUBLE VISION: 0
MUSCULOSKELETAL NEGATIVE: 1
EYE PAIN: 0
NAUSEA: 0

## 2022-05-23 ASSESSMENT — VISUAL ACUITY: OU: 1

## 2022-05-23 ASSESSMENT — FIBROSIS 4 INDEX: FIB4 SCORE: 1.56

## 2022-05-23 NOTE — PROGRESS NOTES
"Subjective     Jaskaran Le is a 76 y.o. male who presents with Eye Problem (Poss eye infection,right eye,x3 days)          HPI     Patient reports eye symptoms for 3 day now. He reports watery discharge from the right eye. He denies any eye pain, reports some itching. Patient cannot specify a mechanism of injury but think he might have gotten something in his eye when it was windy. Patient reports that his vision is \"foggy\" on the said eye, states its like looking thorough a dirty pair of glasses. He denies any doubling of vision, headache, dizziness, nausea, vomiting. His last eye exam was 6 years ago      Review of Systems   Constitutional: Negative for chills and fever.   HENT: Negative.    Eyes: Positive for blurred vision. Negative for double vision, photophobia, pain, discharge and redness.   Respiratory: Negative.    Gastrointestinal: Negative for nausea and vomiting.   Genitourinary: Negative.    Musculoskeletal: Negative.    Skin: Negative.    Neurological: Negative for dizziness and headaches.          Objective     /54 (BP Location: Right arm, Patient Position: Sitting, BP Cuff Size: Adult)   Pulse 82   Temp 37 °C (98.6 °F) (Temporal)   Resp 18   Ht 1.676 m (5' 6\")   Wt 68 kg (150 lb)   SpO2 95%   BMI 24.21 kg/m²      Physical Exam  Constitutional:       Appearance: Normal appearance.   HENT:      Head: Normocephalic.      Nose: Nose normal.   Eyes:      General: Lids are normal. Vision grossly intact.         Right eye: Discharge present.      Extraocular Movements: Extraocular movements intact.      Conjunctiva/sclera:      Right eye: Right conjunctiva is injected. Exudate present.      Comments: Fluorescein lamp exam: Corneal abrasion, right eye  White discharge noted right eye  Vision OD 20/50,OS 20/25, OU 20/25   Cardiovascular:      Rate and Rhythm: Normal rate and regular rhythm.      Pulses: Normal pulses.      Heart sounds: Normal heart sounds.   Pulmonary:      Effort: " Pulmonary effort is normal.      Breath sounds: Normal breath sounds.   Musculoskeletal:         General: Normal range of motion.      Cervical back: Normal range of motion.   Skin:     General: Skin is warm.   Neurological:      General: No focal deficit present.      Mental Status: He is alert.   Psychiatric:         Mood and Affect: Mood normal.         Behavior: Behavior normal.                   Assessment & Plan        1. Cornea abrasion, right, initial encounter    - sulfacetamide (SULAMYD) 10 % Solution; Administer 1 Drop into both eyes every 4 hours for 7 days.  Dispense: 5 mL; Refill: 0  - Referral to Ophthalmology    2. Watering of right eye      3. Blurred vision, right eye    Patient's fluorescein lamp exam showed corneal abrasion on the right eye.  Patient appears to have seasonal allergies, commonly presents with itchy and watery eyes.  Patient possibly scratched/ rubbed his right eye which caused a corneal abrasion.  He is prescribed ophthalmic antibiotics.  Patient is also referred to ophthalmology for further evaluation and management, if symptoms persist.  Recommended use of eyedrops for itching, which patient reports has been using.  Educated patient on signs and symptoms watch out for, when to return to clinic or go to the ER.        Electronically Signed by JESSICA Gr

## 2022-07-21 ENCOUNTER — TELEPHONE (OUTPATIENT)
Dept: HEALTH INFORMATION MANAGEMENT | Facility: OTHER | Age: 77
End: 2022-07-21
Payer: MEDICARE

## 2022-07-27 PROBLEM — E78.00 ELEVATED CHOLESTEROL: Status: ACTIVE | Noted: 2022-07-27

## 2022-08-06 ENCOUNTER — OFFICE VISIT (OUTPATIENT)
Dept: URGENT CARE | Facility: PHYSICIAN GROUP | Age: 77
End: 2022-08-06
Payer: MEDICARE

## 2022-08-06 VITALS
OXYGEN SATURATION: 95 % | SYSTOLIC BLOOD PRESSURE: 140 MMHG | DIASTOLIC BLOOD PRESSURE: 88 MMHG | BODY MASS INDEX: 25.27 KG/M2 | HEIGHT: 64 IN | RESPIRATION RATE: 12 BRPM | TEMPERATURE: 98.5 F | WEIGHT: 148 LBS | HEART RATE: 78 BPM

## 2022-08-06 DIAGNOSIS — R91.8 PULMONARY INFILTRATE: ICD-10-CM

## 2022-08-06 DIAGNOSIS — S20.212A CONTUSION OF LEFT CHEST WALL, INITIAL ENCOUNTER: ICD-10-CM

## 2022-08-06 PROCEDURE — 99214 OFFICE O/P EST MOD 30 MIN: CPT | Performed by: PHYSICIAN ASSISTANT

## 2022-08-06 ASSESSMENT — ENCOUNTER SYMPTOMS
HEADACHES: 0
CHILLS: 0
NAUSEA: 0
CONSTIPATION: 0
EYE PAIN: 0
SORE THROAT: 0
COUGH: 0
FEVER: 0
DIARRHEA: 0
ABDOMINAL PAIN: 0
VOMITING: 0
MYALGIAS: 0
SHORTNESS OF BREATH: 0

## 2022-08-06 ASSESSMENT — FIBROSIS 4 INDEX: FIB4 SCORE: 1.56

## 2022-08-06 NOTE — PROGRESS NOTES
"Subjective:   Jaskaran Le is a 76 y.o. male who presents for Rib Injury (X2days ago tripped and fell and injured left side ribs and feeling pain, hurts to breathe in )      Is a pleasant 76-year-old male who had mechanical ground-level fall 2 days ago and has had persistent left-sided chest wall pain, pain is worse with deep inspiration.  Since injury the patient has not felt like it is getting worse but does not seem to be improving.  He normally is a side sleeper and has not been able to sleep on his side.  He also noted some discomfort of the left wrist as well as a small abrasion on the left eyebrow but both those seem to be bothering him minimally and have improved.  He denies any loss of consciousness.  He is not on blood thinners.  He actually denies striking his head hard.      Review of Systems   Constitutional: Negative for chills and fever.   HENT: Negative for congestion, ear pain and sore throat.    Eyes: Negative for pain.   Respiratory: Negative for cough and shortness of breath.    Cardiovascular: Positive for chest pain.   Gastrointestinal: Negative for abdominal pain, constipation, diarrhea, nausea and vomiting.   Genitourinary: Negative for dysuria.   Musculoskeletal: Negative for myalgias.   Skin: Negative for rash.   Neurological: Negative for headaches.       Medications, Allergies, and current problem list reviewed today in Epic.     Objective:     BP (!) 140/88 (BP Location: Left arm, Patient Position: Sitting, BP Cuff Size: Adult)   Pulse 78   Temp 36.9 °C (98.5 °F) (Temporal)   Resp 12   Ht 1.613 m (5' 3.5\")   Wt 67.1 kg (148 lb)   SpO2 95%     Physical Exam  Vitals reviewed.   Constitutional:       Appearance: Normal appearance.   HENT:      Head: Normocephalic.      Comments: Superficial abrasion left eyebrow, no bony tenderness     Right Ear: External ear normal.      Left Ear: External ear normal.      Nose: Nose normal.      Mouth/Throat:      Mouth: Mucous membranes are " moist.   Eyes:      Conjunctiva/sclera: Conjunctivae normal.   Cardiovascular:      Rate and Rhythm: Normal rate and regular rhythm.   Pulmonary:      Effort: Pulmonary effort is normal.      Comments: TTP mid axillary line around mid thorax without any step-off, crepitance, paradoxical movement or deformity.  Symmetrical chest expansion with inspiration.  Normal auscultatory findings on left compared to right  Skin:     General: Skin is warm and dry.      Capillary Refill: Capillary refill takes less than 2 seconds.   Neurological:      Mental Status: He is alert and oriented to person, place, and time.           RADIOLOGY RESULTS   DX-CHEST-2 VIEWS    Result Date: 8/7/2022 8/7/2022 12:14 PM HISTORY/REASON FOR EXAM:  Cough. TECHNIQUE/EXAM DESCRIPTION AND NUMBER OF VIEWS: Two views of the chest. COMPARISON:  March 2, 2021 FINDINGS: The heart is normal in size. There is bibasilar consolidation and pleural fluid. No pneumothorax identified. Partially calcified mass again noted in the left upper quadrant.     Bibasilar consolidation, left greater right could be due to atelectasis or infiltrate with associated pleural fluid, left greater than right.             Assessment/Plan:     Diagnosis and associated orders:     1. Contusion of left chest wall, initial encounter  DX-CHEST-2 VIEWS    CANCELED: DX-CHEST-2 VIEWS   2. Pulmonary infiltrate  amoxicillin-clavulanate (AUGMENTIN) 875-125 MG Tab      Comments/MDM:     • We are unable to perform x-rays onsite today, I offered the patient to have this done at outside facility however the patient does not have a means of transportation and is traveling by bus.  I am somewhat concerned about this so I did strongly encourage that we have the x-ray performed to ensure that there is no pneumothorax, hemothorax, significant fracture or abnormal finding that would warrant repeat evaluation.  He plans to return tomorrow when the x-ray technician should be available and I will call  him thereafter with results.  I strongly cautioned him that if he notices any deterioration or worsening signs or symptoms he should present to the ER immediately and he demonstrated understanding of those instructions.  Currently patient with no signs of significant fracture or hemodynamic compromise.  • Update: 8/7/22: called patient and discussed xray results.  He does appear to have evidence of increased pulmonary consolidation.  His primary concern he saw me is that subsequent to his chest wall trauma he was taking shallow breaths and was having more productive cough concerning for potential infection.  Given his comorbidities and his age I think it would be wise to initiate antibiotic therapy at this time I think Augmentin is a reasonable option.  I instructed him to follow-up with his primary in 7 to 10 days if he is not noting improvement, if he is worsening he should be immediately reevaluated however if he notes complete resolution of symptoms and returns to his baseline do not feel strongly that he needs follow-up.         Differential diagnosis, natural history, supportive care, and indications for immediate follow-up discussed.    Advised the patient to follow-up with the primary care physician for recheck, reevaluation, and consideration of further management.    Please note that this dictation was created using voice recognition software. I have made a reasonable attempt to correct obvious errors, but I expect that there are errors of grammar and possibly content that I did not discover before finalizing the note.    This note was electronically signed by Parminder Lundy PA-C

## 2022-08-07 ENCOUNTER — APPOINTMENT (OUTPATIENT)
Dept: RADIOLOGY | Facility: IMAGING CENTER | Age: 77
End: 2022-08-07
Attending: PHYSICIAN ASSISTANT
Payer: MEDICARE

## 2022-08-07 PROCEDURE — 71046 X-RAY EXAM CHEST 2 VIEWS: CPT | Mod: TC | Performed by: RADIOLOGY

## 2022-08-07 RX ORDER — AMOXICILLIN AND CLAVULANATE POTASSIUM 875; 125 MG/1; MG/1
1 TABLET, FILM COATED ORAL 2 TIMES DAILY
Qty: 14 TABLET | Refills: 0 | Status: SHIPPED | OUTPATIENT
Start: 2022-08-07 | End: 2022-08-14

## 2022-08-30 ENCOUNTER — OFFICE VISIT (OUTPATIENT)
Dept: MEDICAL GROUP | Facility: MEDICAL CENTER | Age: 77
End: 2022-08-30
Payer: MEDICARE

## 2022-08-30 VITALS
DIASTOLIC BLOOD PRESSURE: 62 MMHG | RESPIRATION RATE: 16 BRPM | BODY MASS INDEX: 25.61 KG/M2 | SYSTOLIC BLOOD PRESSURE: 98 MMHG | OXYGEN SATURATION: 97 % | TEMPERATURE: 98.1 F | HEART RATE: 72 BPM | WEIGHT: 150 LBS | HEIGHT: 64 IN

## 2022-08-30 DIAGNOSIS — E78.00 ELEVATED CHOLESTEROL: ICD-10-CM

## 2022-08-30 DIAGNOSIS — B07.9 WART OF FACE: ICD-10-CM

## 2022-08-30 DIAGNOSIS — J43.2 CENTRILOBULAR EMPHYSEMA (HCC): ICD-10-CM

## 2022-08-30 DIAGNOSIS — Z12.5 SCREENING PSA (PROSTATE SPECIFIC ANTIGEN): ICD-10-CM

## 2022-08-30 DIAGNOSIS — R91.1 NODULE OF LOWER LOBE OF RIGHT LUNG: ICD-10-CM

## 2022-08-30 DIAGNOSIS — F17.200 TOBACCO DEPENDENCE: ICD-10-CM

## 2022-08-30 PROCEDURE — 99214 OFFICE O/P EST MOD 30 MIN: CPT | Performed by: PHYSICIAN ASSISTANT

## 2022-08-30 RX ORDER — FLUTICASONE PROPIONATE AND SALMETEROL 100; 50 UG/1; UG/1
1 POWDER RESPIRATORY (INHALATION) EVERY 12 HOURS
Qty: 180 EACH | Refills: 1 | Status: SHIPPED | OUTPATIENT
Start: 2022-08-30 | End: 2022-11-28

## 2022-08-30 ASSESSMENT — FIBROSIS 4 INDEX: FIB4 SCORE: 1.56

## 2022-08-30 NOTE — ASSESSMENT & PLAN NOTE
This is a pleasant 76-year-old male here today to follow-up on his health.  Requesting a refill today of Advair.  Medication is effective.  Does not need to use rescue inhaler.  Recently seen at urgent care.  Provided an antibiotic that helped with his chest congestion.  Has an appointment in early November with pulmonology.  Dr. Silva.

## 2022-08-30 NOTE — ASSESSMENT & PLAN NOTE
Did not follow-up with dermatology.  States he is currently using an over-the-counter Dr. Amaya's wart medication.  Started that this week.  Has no concerns with a wart on his face.

## 2022-08-30 NOTE — PROGRESS NOTES
Subjective:   Jaskaran Le is a 76 y.o. male here today for emphysema, suspicious right lung lobe and wart on his face.    Centrilobular emphysema (HCC)  This is a pleasant 76-year-old male here today to follow-up on his health.  Requesting a refill today of Advair.  Medication is effective.  Does not need to use rescue inhaler.  Recently seen at urgent care.  Provided an antibiotic that helped with his chest congestion.  Has an appointment in early November with pulmonology.  Dr. Silva.    Nodule of lower lobe of right lung  Last year was found to have a suspicious nodule of his right lower lobe.  Was advised to have a PET scan or biopsy per radiology request.  Per patient pulmonology recommended PET scan but he declined.  He is more than happy living until 77 years of age.  Does not want any surgical intervention.  Does not want to consider any treatment if needed.    Wart of face  Did not follow-up with dermatology.  States he is currently using an over-the-counter Dr. Amaya's wart medication.  Started that this week.  Has no concerns with a wart on his face.     Current medicines (including changes today)  Current Outpatient Medications   Medication Sig Dispense Refill    fluticasone-salmeterol (ADVAIR) 100-50 MCG/ACT AEROSOL POWDER, BREATH ACTIVATED Inhale 1 Puff every 12 hours for 90 days. 180 Each 1    betamethasone valerate (VALISONE) 0.1 % Cream Apply 1 Application topically 2 times a day. 45 g 3    salicylic acid 17 % gel Apply 1 Application topically 2 times a day. 14 g 2    albuterol 108 (90 Base) MCG/ACT Aero Soln inhalation aerosol Inhale 2 Puffs by mouth every 6 hours as needed for Shortness of Breath. 8 g 11     No current facility-administered medications for this visit.     He  has a past medical history of Chronic obstructive pulmonary disease (HCC).    Social History and Family History were reviewed and updated.    ROS   No chest pain, no shortness of breath, no abdominal pain and all  "other systems were reviewed and are negative.       Objective:     BP (!) 98/62 (BP Location: Right arm, Patient Position: Sitting, BP Cuff Size: Adult)   Pulse 72   Temp 36.7 °C (98.1 °F) (Temporal)   Resp 16   Ht 1.613 m (5' 3.5\")   Wt 68 kg (150 lb)   SpO2 97%  Body mass index is 26.15 kg/m².   Physical Exam:  Constitutional: Alert, no distress.  Skin: Warm, dry, good turgor, no rashes in visible areas.  Eye: Equal, round and reactive, conjunctiva clear, lids normal.  ENMT: Lips without lesions, good dentition, oropharynx clear.  Neck: Trachea midline, no masses.   Lymph: No cervical or supraclavicular lymphadenopathy  Respiratory: Unlabored respiratory effort, lungs appear clear.  Psych: Alert and oriented x3, normal affect and mood.        Assessment and Plan:   The following treatment plan was discussed    1. Centrilobular emphysema (HCC)  Chronic condition.  Stable.  Renewed Advair as directed.  Ordered labs.  Continue to follow with pulmonology.  - CBC WITH DIFFERENTIAL; Future  - Comp Metabolic Panel; Future  - fluticasone-salmeterol (ADVAIR) 100-50 MCG/ACT AEROSOL POWDER, BREATH ACTIVATED; Inhale 1 Puff every 12 hours for 90 days.  Dispense: 180 Each; Refill: 1    2. Elevated cholesterol  Chronic condition but will check cholesterol profile.  Fast 8 hours.  - Lipid Profile; Future    3. Tobacco dependence  Chronic use.  We will continue to monitor.  Still half pack a day.    4. Wart of face  Chronic condition.  May continue over-the-counter Dr. Amaya's product.  Offered referral to dermatology but he declined.    5. Screening PSA (prostate specific antigen)  PSA ordered.  Screening.  - PROSTATE SPECIFIC AG SCREENING; Future    6. Nodule of lower lobe of right lung  Chronic condition.  Patient deferred any further intervention today.  I asked him to follow-up with his pulmonologist to discuss possible PET scan.  He declined any intervention by me.         Followup: Return in about 6 months (around " 2/28/2023), or if symptoms worsen or fail to improve.    Please note that this dictation was created using voice recognition software. I have made every reasonable attempt to correct obvious errors, but I expect that there are errors of grammar and possibly content that I did not discover before finalizing the note.

## 2022-08-30 NOTE — ASSESSMENT & PLAN NOTE
Last year was found to have a suspicious nodule of his right lower lobe.  Was advised to have a PET scan or biopsy per radiology request.  Per patient pulmonology recommended PET scan but he declined.  He is more than happy living until 77 years of age.  Does not want any surgical intervention.  Does not want to consider any treatment if needed.

## 2022-09-02 ENCOUNTER — HOSPITAL ENCOUNTER (OUTPATIENT)
Dept: LAB | Facility: MEDICAL CENTER | Age: 77
End: 2022-09-02
Attending: PHYSICIAN ASSISTANT
Payer: MEDICARE

## 2022-09-02 DIAGNOSIS — Z12.5 SCREENING PSA (PROSTATE SPECIFIC ANTIGEN): ICD-10-CM

## 2022-09-02 DIAGNOSIS — E78.00 ELEVATED CHOLESTEROL: ICD-10-CM

## 2022-09-02 DIAGNOSIS — J43.2 CENTRILOBULAR EMPHYSEMA (HCC): ICD-10-CM

## 2022-09-02 LAB
ALBUMIN SERPL BCP-MCNC: 4.1 G/DL (ref 3.2–4.9)
ALBUMIN/GLOB SERPL: 1.3 G/DL
ALP SERPL-CCNC: 91 U/L (ref 30–99)
ALT SERPL-CCNC: 11 U/L (ref 2–50)
ANION GAP SERPL CALC-SCNC: 10 MMOL/L (ref 7–16)
AST SERPL-CCNC: 18 U/L (ref 12–45)
BASOPHILS # BLD AUTO: 0.3 % (ref 0–1.8)
BASOPHILS # BLD: 0.02 K/UL (ref 0–0.12)
BILIRUB SERPL-MCNC: 0.4 MG/DL (ref 0.1–1.5)
BUN SERPL-MCNC: 13 MG/DL (ref 8–22)
CALCIUM SERPL-MCNC: 9.2 MG/DL (ref 8.5–10.5)
CHLORIDE SERPL-SCNC: 102 MMOL/L (ref 96–112)
CHOLEST SERPL-MCNC: 200 MG/DL (ref 100–199)
CO2 SERPL-SCNC: 26 MMOL/L (ref 20–33)
CREAT SERPL-MCNC: 0.76 MG/DL (ref 0.5–1.4)
EOSINOPHIL # BLD AUTO: 0.09 K/UL (ref 0–0.51)
EOSINOPHIL NFR BLD: 1.3 % (ref 0–6.9)
ERYTHROCYTE [DISTWIDTH] IN BLOOD BY AUTOMATED COUNT: 49.5 FL (ref 35.9–50)
GFR SERPLBLD CREATININE-BSD FMLA CKD-EPI: 94 ML/MIN/1.73 M 2
GLOBULIN SER CALC-MCNC: 3.1 G/DL (ref 1.9–3.5)
GLUCOSE SERPL-MCNC: 81 MG/DL (ref 65–99)
HCT VFR BLD AUTO: 47.3 % (ref 42–52)
HDLC SERPL-MCNC: 67 MG/DL
HGB BLD-MCNC: 15.4 G/DL (ref 14–18)
IMM GRANULOCYTES # BLD AUTO: 0.01 K/UL (ref 0–0.11)
IMM GRANULOCYTES NFR BLD AUTO: 0.1 % (ref 0–0.9)
LDLC SERPL CALC-MCNC: 122 MG/DL
LYMPHOCYTES # BLD AUTO: 2.78 K/UL (ref 1–4.8)
LYMPHOCYTES NFR BLD: 40.3 % (ref 22–41)
MCH RBC QN AUTO: 31.2 PG (ref 27–33)
MCHC RBC AUTO-ENTMCNC: 32.6 G/DL (ref 33.7–35.3)
MCV RBC AUTO: 95.9 FL (ref 81.4–97.8)
MONOCYTES # BLD AUTO: 0.59 K/UL (ref 0–0.85)
MONOCYTES NFR BLD AUTO: 8.6 % (ref 0–13.4)
NEUTROPHILS # BLD AUTO: 3.4 K/UL (ref 1.82–7.42)
NEUTROPHILS NFR BLD: 49.4 % (ref 44–72)
NRBC # BLD AUTO: 0 K/UL
NRBC BLD-RTO: 0 /100 WBC
PLATELET # BLD AUTO: 264 K/UL (ref 164–446)
PMV BLD AUTO: 9.4 FL (ref 9–12.9)
POTASSIUM SERPL-SCNC: 4 MMOL/L (ref 3.6–5.5)
PROT SERPL-MCNC: 7.2 G/DL (ref 6–8.2)
PSA SERPL-MCNC: 0.16 NG/ML (ref 0–4)
RBC # BLD AUTO: 4.93 M/UL (ref 4.7–6.1)
SODIUM SERPL-SCNC: 138 MMOL/L (ref 135–145)
TRIGL SERPL-MCNC: 57 MG/DL (ref 0–149)
WBC # BLD AUTO: 6.9 K/UL (ref 4.8–10.8)

## 2022-09-02 PROCEDURE — 36415 COLL VENOUS BLD VENIPUNCTURE: CPT

## 2022-09-02 PROCEDURE — 85025 COMPLETE CBC W/AUTO DIFF WBC: CPT

## 2022-09-02 PROCEDURE — 80053 COMPREHEN METABOLIC PANEL: CPT

## 2022-09-02 PROCEDURE — 80061 LIPID PANEL: CPT

## 2022-09-02 PROCEDURE — 84153 ASSAY OF PSA TOTAL: CPT

## 2022-09-12 ENCOUNTER — HOSPITAL ENCOUNTER (OUTPATIENT)
Dept: CARDIOLOGY | Facility: MEDICAL CENTER | Age: 77
End: 2022-09-12
Attending: ANESTHESIOLOGY
Payer: MEDICARE

## 2022-09-12 DIAGNOSIS — Z01.810 PRE-OPERATIVE CARDIOVASCULAR EXAMINATION: ICD-10-CM

## 2022-09-12 LAB — EKG IMPRESSION: NORMAL

## 2022-09-12 PROCEDURE — 93005 ELECTROCARDIOGRAM TRACING: CPT

## 2022-09-12 PROCEDURE — 93010 ELECTROCARDIOGRAM REPORT: CPT | Performed by: INTERNAL MEDICINE

## 2022-11-07 ENCOUNTER — OFFICE VISIT (OUTPATIENT)
Dept: SLEEP MEDICINE | Facility: MEDICAL CENTER | Age: 77
End: 2022-11-07
Payer: MEDICARE

## 2022-11-07 VITALS
SYSTOLIC BLOOD PRESSURE: 112 MMHG | DIASTOLIC BLOOD PRESSURE: 74 MMHG | HEIGHT: 64 IN | HEART RATE: 72 BPM | WEIGHT: 151 LBS | BODY MASS INDEX: 25.78 KG/M2 | OXYGEN SATURATION: 93 %

## 2022-11-07 DIAGNOSIS — J44.9 CHRONIC OBSTRUCTIVE PULMONARY DISEASE, UNSPECIFIED COPD TYPE (HCC): ICD-10-CM

## 2022-11-07 DIAGNOSIS — Z72.0 TOBACCO USE: ICD-10-CM

## 2022-11-07 DIAGNOSIS — R91.1 LUNG NODULE: ICD-10-CM

## 2022-11-07 PROCEDURE — 99214 OFFICE O/P EST MOD 30 MIN: CPT | Performed by: INTERNAL MEDICINE

## 2022-11-07 RX ORDER — ALBUTEROL SULFATE 90 UG/1
2 AEROSOL, METERED RESPIRATORY (INHALATION) EVERY 6 HOURS PRN
Qty: 8 G | Refills: 11 | Status: SHIPPED | OUTPATIENT
Start: 2022-11-07 | End: 2023-01-01 | Stop reason: SDUPTHER

## 2022-11-07 ASSESSMENT — ENCOUNTER SYMPTOMS
PALPITATIONS: 0
PHOTOPHOBIA: 0
SORE THROAT: 0
WEAKNESS: 0
ORTHOPNEA: 0
DIAPHORESIS: 0
EYE PAIN: 0
DIZZINESS: 0
MYALGIAS: 0
WHEEZING: 0
SPUTUM PRODUCTION: 0
DIARRHEA: 0
PND: 0
DOUBLE VISION: 0
CHILLS: 0
FEVER: 0
HEARTBURN: 0
CONSTIPATION: 0
SPEECH CHANGE: 0
CLAUDICATION: 0
VOMITING: 0
TREMORS: 0
NECK PAIN: 0
EYE REDNESS: 0
DEPRESSION: 0
BACK PAIN: 0
FALLS: 0
BLURRED VISION: 0
COUGH: 0
ABDOMINAL PAIN: 0
SHORTNESS OF BREATH: 0
WEIGHT LOSS: 0
STRIDOR: 0
SINUS PAIN: 0
FOCAL WEAKNESS: 0
NAUSEA: 0
HEMOPTYSIS: 0
HEADACHES: 0
EYE DISCHARGE: 0

## 2022-11-07 ASSESSMENT — FIBROSIS 4 INDEX: FIB4 SCORE: 1.54

## 2022-11-07 NOTE — PROGRESS NOTES
Chief Complaint   Patient presents with    COPD     Last seen 05/04/22         HPI: This patient is a 77 y.o. male whom is followed in our clinic for COPD and pulmonary nodule last seen by me on 5/4/22. Patient's past medical history is notable only for adrenal mass which is being monitored observationally.  He also has cataracts which have been operated on the remote past but recently needed new lens replacement on R which was done in October and resulted in pt putting off surveillance CT chest that we had ordered to f/u on RLL partially solid GGO. CT chest from June of 2020 showed right lower lobe 12 mm GGO  This was actually a follow-up CT from abnormal imaging done in March of 2020 during which there were multiple abnormalities all of which had resolved other than the RLL opacity. F/U CT done in September 2021 showed increase in RLL GGO with solid component to max 15 mm in diam. I spoke with pt following the study and he declined PET or attempt to biopsy. He was willing to continue surveillance imaging and consider XRT if nodule continues to grow but declines invasive procedures such as biopsy or surgical resection. He also has no interest in chemo. He is willing to schedule f/u CT. Regarding his COPD, PFTs from 6/2021 showed moderate obstructive lung disease with FEV1 of 52% pred, mild air trapping, normal TLC and low normal DLCO. Functionally he is MMRC 1 and swims daily. He is on advair HFA and prn albuterol which he uses never. No exacerbations. No acute complaints today. He is a current tobacco user roughly 1/2 pack/day but has smoked up to 1 pack/day for period of 50 to 55 years.    Past Medical History:   Diagnosis Date    Chronic obstructive pulmonary disease (HCC)        Social History     Socioeconomic History    Marital status:      Spouse name: Not on file    Number of children: Not on file    Years of education: Not on file    Highest education level: Not on file   Occupational History    Not  on file   Tobacco Use    Smoking status: Every Day     Packs/day: 0.50     Years: 60.00     Pack years: 30.00     Types: Cigarettes     Start date: 1959    Smokeless tobacco: Never    Tobacco comments:     since age 14, no smoking for 24 hours   Vaping Use    Vaping Use: Former    Substances: Nicotine    Devices: Disposable    Passive vaping exposure: Yes   Substance and Sexual Activity    Alcohol use: No     Alcohol/week: 0.0 oz     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    Drug use: No    Sexual activity: Not Currently   Other Topics Concern    Not on file   Social History Narrative    Not on file     Social Determinants of Health     Financial Resource Strain: Not on file   Food Insecurity: Not on file   Transportation Needs: Not on file   Physical Activity: Not on file   Stress: Not on file   Social Connections: Not on file   Intimate Partner Violence: Not on file   Housing Stability: Not on file       Family History   Problem Relation Age of Onset    Dementia Mother     Lung Disease Father     Cancer Father         Lung CA    Dementia Sister     Heart Disease Brother     Hypertension Brother     Dementia Sister         Alzheimer's    Diabetes Neg Hx        Current Outpatient Medications on File Prior to Visit   Medication Sig Dispense Refill    fluticasone-salmeterol (ADVAIR) 100-50 MCG/ACT AEROSOL POWDER, BREATH ACTIVATED Inhale 1 Puff every 12 hours for 90 days. 180 Each 1    betamethasone valerate (VALISONE) 0.1 % Cream Apply 1 Application topically 2 times a day. 45 g 3    salicylic acid 17 % gel Apply 1 Application topically 2 times a day. 14 g 2     No current facility-administered medications on file prior to visit.       Patient has no known allergies.      ROS:   Review of Systems   Constitutional:  Negative for chills, diaphoresis, fever, malaise/fatigue and weight loss.   HENT:  Negative for congestion, ear discharge, ear pain, hearing loss, nosebleeds, sinus pain, sore throat and tinnitus.    Eyes:   "Negative for blurred vision, double vision, photophobia, pain, discharge and redness.   Respiratory:  Negative for cough, hemoptysis, sputum production, shortness of breath, wheezing and stridor.    Cardiovascular:  Negative for chest pain, palpitations, orthopnea, claudication, leg swelling and PND.   Gastrointestinal:  Negative for abdominal pain, constipation, diarrhea, heartburn, nausea and vomiting.   Genitourinary:  Negative for dysuria and urgency.   Musculoskeletal:  Negative for back pain, falls, joint pain, myalgias and neck pain.   Skin:  Negative for itching and rash.   Neurological:  Negative for dizziness, tremors, speech change, focal weakness, weakness and headaches.   Endo/Heme/Allergies:  Negative for environmental allergies.   Psychiatric/Behavioral:  Negative for depression.      /74 (BP Location: Right arm, Patient Position: Sitting, BP Cuff Size: Adult)   Pulse 72   Ht 1.613 m (5' 3.5\")   Wt 68.5 kg (151 lb)   SpO2 93%   Physical Exam  Vitals reviewed.   Constitutional:       General: He is not in acute distress.     Appearance: Normal appearance. He is normal weight.   HENT:      Head: Normocephalic and atraumatic.      Right Ear: External ear normal.      Left Ear: External ear normal.      Nose: Nose normal. No congestion.      Mouth/Throat:      Mouth: Mucous membranes are moist.      Pharynx: Oropharynx is clear. No oropharyngeal exudate.   Eyes:      General: No scleral icterus.     Extraocular Movements: Extraocular movements intact.      Pupils: Pupils are equal, round, and reactive to light.      Comments: Bl conjunctival injection   Cardiovascular:      Rate and Rhythm: Normal rate and regular rhythm.      Heart sounds: Normal heart sounds. No murmur heard.    No gallop.   Pulmonary:      Effort: Pulmonary effort is normal.      Breath sounds: Normal breath sounds.   Abdominal:      General: There is no distension.      Palpations: Abdomen is soft.   Musculoskeletal:         " General: Normal range of motion.      Cervical back: Normal range of motion and neck supple.      Right lower leg: No edema.      Left lower leg: No edema.   Skin:     General: Skin is warm and dry.      Findings: No rash.   Neurological:      Mental Status: He is alert and oriented to person, place, and time.      Cranial Nerves: No cranial nerve deficit.   Psychiatric:         Mood and Affect: Mood normal.         Behavior: Behavior normal.       PFTs as reviewed by me personally: as per hPI    Imaging as reviewed by me personally:  as per hPI    Assessment:  1. Chronic obstructive pulmonary disease, unspecified COPD type (HCC)  albuterol 108 (90 Base) MCG/ACT Aero Soln inhalation aerosol    PULMONARY FUNCTION TESTS -Test requested: Complete Pulmonary Function Test      2. Lung nodule        3. Tobacco use            Plan:  Chronic. Moderate but likely progressive given ongoing tobacco use. Good functional status with good symptoms control on advair and prn olga which we will continue. Update PFTs at f/u in 6 mos. Counseled on tobacco cessation  Pt agreed to schedule surveillance CT ASAP; see discussion in HPI  Counseled on cessation; has tried pharmacologic therapy.   Return in about 6 months (around 5/7/2023) for PFT at time of follow up, ct chest now.

## 2022-11-11 ENCOUNTER — HOSPITAL ENCOUNTER (OUTPATIENT)
Dept: RADIOLOGY | Facility: MEDICAL CENTER | Age: 77
End: 2022-11-11
Attending: INTERNAL MEDICINE
Payer: MEDICARE

## 2022-11-11 DIAGNOSIS — J44.9 CHRONIC OBSTRUCTIVE PULMONARY DISEASE, UNSPECIFIED COPD TYPE (HCC): ICD-10-CM

## 2022-11-11 DIAGNOSIS — R91.1 SOLITARY PULMONARY NODULE: ICD-10-CM

## 2022-11-11 DIAGNOSIS — R91.1 LUNG NODULE: ICD-10-CM

## 2022-11-11 PROCEDURE — 71250 CT THORAX DX C-: CPT

## 2022-12-28 ENCOUNTER — DOCUMENTATION (OUTPATIENT)
Dept: HEALTH INFORMATION MANAGEMENT | Facility: OTHER | Age: 77
End: 2022-12-28
Payer: MEDICARE

## 2023-01-01 ENCOUNTER — TELEPHONE (OUTPATIENT)
Dept: SLEEP MEDICINE | Facility: MEDICAL CENTER | Age: 78
End: 2023-01-01
Payer: MEDICARE

## 2023-01-01 ENCOUNTER — OFFICE VISIT (OUTPATIENT)
Dept: SLEEP MEDICINE | Facility: MEDICAL CENTER | Age: 78
End: 2023-01-01
Attending: INTERNAL MEDICINE
Payer: MEDICARE

## 2023-01-01 ENCOUNTER — TELEPHONE (OUTPATIENT)
Dept: HEALTH INFORMATION MANAGEMENT | Facility: OTHER | Age: 78
End: 2023-01-01
Payer: MEDICARE

## 2023-01-01 ENCOUNTER — ANESTHESIA (OUTPATIENT)
Dept: SURGERY | Facility: MEDICAL CENTER | Age: 78
DRG: 167 | End: 2023-01-01
Payer: MEDICARE

## 2023-01-01 ENCOUNTER — PHARMACY VISIT (OUTPATIENT)
Dept: PHARMACY | Facility: MEDICAL CENTER | Age: 78
End: 2023-01-01
Payer: COMMERCIAL

## 2023-01-01 ENCOUNTER — OFFICE VISIT (OUTPATIENT)
Dept: MEDICAL GROUP | Facility: MEDICAL CENTER | Age: 78
End: 2023-01-01
Payer: MEDICARE

## 2023-01-01 ENCOUNTER — PATIENT MESSAGE (OUTPATIENT)
Dept: SLEEP MEDICINE | Facility: MEDICAL CENTER | Age: 78
End: 2023-01-01
Payer: MEDICARE

## 2023-01-01 ENCOUNTER — HOSPITAL ENCOUNTER (OUTPATIENT)
Dept: LAB | Facility: MEDICAL CENTER | Age: 78
End: 2023-09-07
Attending: PHYSICIAN ASSISTANT
Payer: MEDICARE

## 2023-01-01 ENCOUNTER — TELEPHONE (OUTPATIENT)
Dept: MEDICAL GROUP | Facility: MEDICAL CENTER | Age: 78
End: 2023-01-01
Payer: MEDICARE

## 2023-01-01 ENCOUNTER — APPOINTMENT (OUTPATIENT)
Dept: ADMISSIONS | Facility: MEDICAL CENTER | Age: 78
DRG: 167 | End: 2023-01-01
Attending: INTERNAL MEDICINE
Payer: MEDICARE

## 2023-01-01 ENCOUNTER — HOSPITAL ENCOUNTER (INPATIENT)
Facility: MEDICAL CENTER | Age: 78
LOS: 1 days | DRG: 167 | End: 2023-12-15
Attending: INTERNAL MEDICINE | Admitting: HOSPITALIST
Payer: MEDICARE

## 2023-01-01 ENCOUNTER — HOSPITAL ENCOUNTER (OUTPATIENT)
Dept: HEMATOLOGY ONCOLOGY | Facility: MEDICAL CENTER | Age: 78
End: 2023-12-29
Attending: STUDENT IN AN ORGANIZED HEALTH CARE EDUCATION/TRAINING PROGRAM
Payer: MEDICARE

## 2023-01-01 ENCOUNTER — OFFICE VISIT (OUTPATIENT)
Dept: URGENT CARE | Facility: CLINIC | Age: 78
End: 2023-01-01
Payer: MEDICARE

## 2023-01-01 ENCOUNTER — HOSPITAL ENCOUNTER (OUTPATIENT)
Dept: LAB | Facility: MEDICAL CENTER | Age: 78
End: 2023-09-20
Attending: PHYSICIAN ASSISTANT
Payer: MEDICARE

## 2023-01-01 ENCOUNTER — APPOINTMENT (OUTPATIENT)
Dept: RADIOLOGY | Facility: MEDICAL CENTER | Age: 78
End: 2023-01-01
Attending: STUDENT IN AN ORGANIZED HEALTH CARE EDUCATION/TRAINING PROGRAM
Payer: MEDICARE

## 2023-01-01 ENCOUNTER — HOSPITAL ENCOUNTER (OUTPATIENT)
Dept: RADIOLOGY | Facility: MEDICAL CENTER | Age: 78
End: 2023-11-28
Attending: INTERNAL MEDICINE
Payer: MEDICARE

## 2023-01-01 ENCOUNTER — APPOINTMENT (OUTPATIENT)
Dept: CARDIOLOGY | Facility: MEDICAL CENTER | Age: 78
DRG: 167 | End: 2023-01-01
Attending: HOSPITALIST
Payer: MEDICARE

## 2023-01-01 ENCOUNTER — ANESTHESIA EVENT (OUTPATIENT)
Dept: SURGERY | Facility: MEDICAL CENTER | Age: 78
DRG: 167 | End: 2023-01-01
Payer: MEDICARE

## 2023-01-01 VITALS
TEMPERATURE: 97.4 F | DIASTOLIC BLOOD PRESSURE: 60 MMHG | WEIGHT: 153.88 LBS | OXYGEN SATURATION: 95 % | BODY MASS INDEX: 26.27 KG/M2 | HEART RATE: 66 BPM | SYSTOLIC BLOOD PRESSURE: 124 MMHG | HEIGHT: 64 IN

## 2023-01-01 VITALS
TEMPERATURE: 96.5 F | BODY MASS INDEX: 26.7 KG/M2 | HEIGHT: 64 IN | HEART RATE: 75 BPM | DIASTOLIC BLOOD PRESSURE: 60 MMHG | WEIGHT: 156.4 LBS | SYSTOLIC BLOOD PRESSURE: 100 MMHG | OXYGEN SATURATION: 100 %

## 2023-01-01 VITALS
SYSTOLIC BLOOD PRESSURE: 118 MMHG | RESPIRATION RATE: 18 BRPM | WEIGHT: 151 LBS | HEIGHT: 66 IN | TEMPERATURE: 98.5 F | DIASTOLIC BLOOD PRESSURE: 70 MMHG | OXYGEN SATURATION: 92 % | HEART RATE: 70 BPM | BODY MASS INDEX: 24.27 KG/M2

## 2023-01-01 VITALS
SYSTOLIC BLOOD PRESSURE: 90 MMHG | OXYGEN SATURATION: 90 % | HEIGHT: 66 IN | DIASTOLIC BLOOD PRESSURE: 58 MMHG | HEART RATE: 78 BPM | BODY MASS INDEX: 24.91 KG/M2 | WEIGHT: 155 LBS

## 2023-01-01 VITALS
SYSTOLIC BLOOD PRESSURE: 120 MMHG | TEMPERATURE: 98.1 F | HEART RATE: 77 BPM | WEIGHT: 137.6 LBS | HEIGHT: 64 IN | BODY MASS INDEX: 23.49 KG/M2 | DIASTOLIC BLOOD PRESSURE: 60 MMHG | OXYGEN SATURATION: 100 %

## 2023-01-01 VITALS
OXYGEN SATURATION: 100 % | RESPIRATION RATE: 18 BRPM | HEART RATE: 78 BPM | TEMPERATURE: 98.7 F | BODY MASS INDEX: 25.63 KG/M2 | SYSTOLIC BLOOD PRESSURE: 146 MMHG | DIASTOLIC BLOOD PRESSURE: 92 MMHG | HEIGHT: 66 IN | WEIGHT: 159.5 LBS

## 2023-01-01 VITALS
SYSTOLIC BLOOD PRESSURE: 116 MMHG | TEMPERATURE: 99.5 F | WEIGHT: 141.98 LBS | HEIGHT: 64 IN | OXYGEN SATURATION: 89 % | DIASTOLIC BLOOD PRESSURE: 60 MMHG | BODY MASS INDEX: 24.24 KG/M2 | HEART RATE: 105 BPM

## 2023-01-01 VITALS — BODY MASS INDEX: 25.24 KG/M2 | HEIGHT: 66 IN

## 2023-01-01 VITALS
TEMPERATURE: 97.5 F | DIASTOLIC BLOOD PRESSURE: 53 MMHG | HEART RATE: 67 BPM | SYSTOLIC BLOOD PRESSURE: 109 MMHG | RESPIRATION RATE: 18 BRPM | HEIGHT: 64 IN | WEIGHT: 147.93 LBS | BODY MASS INDEX: 25.25 KG/M2 | OXYGEN SATURATION: 93 %

## 2023-01-01 VITALS — HEIGHT: 64 IN | BODY MASS INDEX: 26.29 KG/M2 | WEIGHT: 154 LBS

## 2023-01-01 DIAGNOSIS — J44.9 CHRONIC OBSTRUCTIVE PULMONARY DISEASE, UNSPECIFIED COPD TYPE (HCC): ICD-10-CM

## 2023-01-01 DIAGNOSIS — E78.00 ELEVATED CHOLESTEROL: ICD-10-CM

## 2023-01-01 DIAGNOSIS — H10.023 OTHER MUCOPURULENT CONJUNCTIVITIS OF BOTH EYES: ICD-10-CM

## 2023-01-01 DIAGNOSIS — R91.8 RIGHT LOWER LOBE LUNG MASS: ICD-10-CM

## 2023-01-01 DIAGNOSIS — H10.33 ACUTE BACTERIAL CONJUNCTIVITIS OF BOTH EYES: ICD-10-CM

## 2023-01-01 DIAGNOSIS — J44.1 COPD WITH ACUTE EXACERBATION (HCC): ICD-10-CM

## 2023-01-01 DIAGNOSIS — J43.2 CENTRILOBULAR EMPHYSEMA (HCC): ICD-10-CM

## 2023-01-01 DIAGNOSIS — I50.20 SYSTOLIC HEART FAILURE, UNSPECIFIED HF CHRONICITY (HCC): ICD-10-CM

## 2023-01-01 DIAGNOSIS — R91.1 LUNG NODULE: ICD-10-CM

## 2023-01-01 DIAGNOSIS — Z12.5 SCREENING PSA (PROSTATE SPECIFIC ANTIGEN): ICD-10-CM

## 2023-01-01 DIAGNOSIS — Z12.12 SCREENING FOR COLORECTAL CANCER: ICD-10-CM

## 2023-01-01 DIAGNOSIS — Z01.812 PRE-OPERATIVE LABORATORY EXAMINATION: ICD-10-CM

## 2023-01-01 DIAGNOSIS — I70.0 ATHEROSCLEROSIS OF AORTA (HCC): ICD-10-CM

## 2023-01-01 DIAGNOSIS — L20.82 FLEXURAL ECZEMA: ICD-10-CM

## 2023-01-01 DIAGNOSIS — E27.8 LEFT ADRENAL MASS (HCC): ICD-10-CM

## 2023-01-01 DIAGNOSIS — G89.29 CHRONIC PAIN OF BOTH KNEES: ICD-10-CM

## 2023-01-01 DIAGNOSIS — R79.89 ABNORMAL CBC: ICD-10-CM

## 2023-01-01 DIAGNOSIS — M25.562 CHRONIC PAIN OF BOTH KNEES: ICD-10-CM

## 2023-01-01 DIAGNOSIS — C34.91 NSCLC OF RIGHT LUNG (HCC): ICD-10-CM

## 2023-01-01 DIAGNOSIS — M79.672 LEFT FOOT PAIN: ICD-10-CM

## 2023-01-01 DIAGNOSIS — Z12.11 SCREENING FOR COLORECTAL CANCER: ICD-10-CM

## 2023-01-01 DIAGNOSIS — M25.561 CHRONIC PAIN OF BOTH KNEES: ICD-10-CM

## 2023-01-01 DIAGNOSIS — Z72.0 TOBACCO USE: ICD-10-CM

## 2023-01-01 DIAGNOSIS — M25.512 ACUTE PAIN OF LEFT SHOULDER: ICD-10-CM

## 2023-01-01 DIAGNOSIS — Z09 HOSPITAL DISCHARGE FOLLOW-UP: Primary | ICD-10-CM

## 2023-01-01 DIAGNOSIS — C34.2 NSCLC OF RIGHT MIDDLE LOBE (HCC): ICD-10-CM

## 2023-01-01 LAB
ALBUMIN SERPL BCP-MCNC: 3.9 G/DL (ref 3.2–4.9)
ALBUMIN/GLOB SERPL: 0.9 G/DL
ALP SERPL-CCNC: 131 U/L (ref 30–99)
ALT SERPL-CCNC: 13 U/L (ref 2–50)
ANION GAP SERPL CALC-SCNC: 10 MMOL/L (ref 7–16)
ANION GAP SERPL CALC-SCNC: 11 MMOL/L (ref 7–16)
ANION GAP SERPL CALC-SCNC: 11 MMOL/L (ref 7–16)
ANION GAP SERPL CALC-SCNC: 12 MMOL/L (ref 7–16)
AST SERPL-CCNC: 14 U/L (ref 12–45)
BACTERIA SPEC RESP CULT: NORMAL
BASOPHILS # BLD AUTO: 0.2 % (ref 0–1.8)
BASOPHILS # BLD: 0.02 K/UL (ref 0–0.12)
BASOPHILS # BLD: 0.03 K/UL (ref 0–0.12)
BASOPHILS # BLD: 0.04 K/UL (ref 0–0.12)
BILIRUB SERPL-MCNC: 0.4 MG/DL (ref 0.1–1.5)
BUN SERPL-MCNC: 10 MG/DL (ref 8–22)
BUN SERPL-MCNC: 6 MG/DL (ref 8–22)
BUN SERPL-MCNC: 7 MG/DL (ref 8–22)
BUN SERPL-MCNC: 9 MG/DL (ref 8–22)
CALCIUM ALBUM COR SERPL-MCNC: 9.2 MG/DL (ref 8.5–10.5)
CALCIUM SERPL-MCNC: 8.4 MG/DL (ref 8.4–10.2)
CALCIUM SERPL-MCNC: 8.5 MG/DL (ref 8.4–10.2)
CALCIUM SERPL-MCNC: 8.8 MG/DL (ref 8.4–10.2)
CALCIUM SERPL-MCNC: 9.1 MG/DL (ref 8.5–10.5)
CHLORIDE SERPL-SCNC: 100 MMOL/L (ref 96–112)
CHLORIDE SERPL-SCNC: 91 MMOL/L (ref 96–112)
CHLORIDE SERPL-SCNC: 96 MMOL/L (ref 96–112)
CHLORIDE SERPL-SCNC: 98 MMOL/L (ref 96–112)
CHOLEST SERPL-MCNC: 195 MG/DL (ref 100–199)
CO2 SERPL-SCNC: 22 MMOL/L (ref 20–33)
CO2 SERPL-SCNC: 24 MMOL/L (ref 20–33)
CO2 SERPL-SCNC: 25 MMOL/L (ref 20–33)
CO2 SERPL-SCNC: 26 MMOL/L (ref 20–33)
CREAT SERPL-MCNC: 0.47 MG/DL (ref 0.5–1.4)
CREAT SERPL-MCNC: 0.54 MG/DL (ref 0.5–1.4)
CREAT SERPL-MCNC: 0.66 MG/DL (ref 0.5–1.4)
CREAT SERPL-MCNC: 0.78 MG/DL (ref 0.5–1.4)
EKG IMPRESSION: NORMAL
EOSINOPHIL # BLD AUTO: 0.03 K/UL (ref 0–0.51)
EOSINOPHIL # BLD AUTO: 0.08 K/UL (ref 0–0.51)
EOSINOPHIL # BLD AUTO: 0.11 K/UL (ref 0–0.51)
EOSINOPHIL NFR BLD: 0.2 % (ref 0–6.9)
EOSINOPHIL NFR BLD: 0.7 % (ref 0–6.9)
EOSINOPHIL NFR BLD: 0.9 % (ref 0–6.9)
ERYTHROCYTE [DISTWIDTH] IN BLOOD BY AUTOMATED COUNT: 43.1 FL (ref 35.9–50)
ERYTHROCYTE [DISTWIDTH] IN BLOOD BY AUTOMATED COUNT: 46.7 FL (ref 35.9–50)
ERYTHROCYTE [DISTWIDTH] IN BLOOD BY AUTOMATED COUNT: 49.2 FL (ref 35.9–50)
ERYTHROCYTE [DISTWIDTH] IN BLOOD BY AUTOMATED COUNT: 50.9 FL (ref 35.9–50)
ERYTHROCYTE [DISTWIDTH] IN BLOOD BY AUTOMATED COUNT: 52.4 FL (ref 35.9–50)
FUNGUS SPEC FUNGUS STN: NORMAL
GFR SERPLBLD CREATININE-BSD FMLA CKD-EPI: 103 ML/MIN/1.73 M 2
GFR SERPLBLD CREATININE-BSD FMLA CKD-EPI: 108 ML/MIN/1.73 M 2
GFR SERPLBLD CREATININE-BSD FMLA CKD-EPI: 92 ML/MIN/1.73 M 2
GFR SERPLBLD CREATININE-BSD FMLA CKD-EPI: 97 ML/MIN/1.73 M 2
GLOBULIN SER CALC-MCNC: 4.2 G/DL (ref 1.9–3.5)
GLUCOSE SERPL-MCNC: 109 MG/DL (ref 65–99)
GLUCOSE SERPL-MCNC: 128 MG/DL (ref 65–99)
GLUCOSE SERPL-MCNC: 97 MG/DL (ref 65–99)
GLUCOSE SERPL-MCNC: 99 MG/DL (ref 65–99)
GRAM STN SPEC: NORMAL
GRAM STN SPEC: NORMAL
HCT VFR BLD AUTO: 36.7 % (ref 42–52)
HCT VFR BLD AUTO: 37.2 % (ref 42–52)
HCT VFR BLD AUTO: 37.4 % (ref 42–52)
HCT VFR BLD AUTO: 43.4 % (ref 42–52)
HCT VFR BLD AUTO: 44.2 % (ref 42–52)
HDLC SERPL-MCNC: 48 MG/DL
HGB BLD-MCNC: 11.8 G/DL (ref 14–18)
HGB BLD-MCNC: 11.8 G/DL (ref 14–18)
HGB BLD-MCNC: 11.9 G/DL (ref 14–18)
HGB BLD-MCNC: 13.4 G/DL (ref 14–18)
HGB BLD-MCNC: 14 G/DL (ref 14–18)
IMM GRANULOCYTES # BLD AUTO: 0.03 K/UL (ref 0–0.11)
IMM GRANULOCYTES # BLD AUTO: 0.04 K/UL (ref 0–0.11)
IMM GRANULOCYTES # BLD AUTO: 0.13 K/UL (ref 0–0.11)
IMM GRANULOCYTES NFR BLD AUTO: 0.2 % (ref 0–0.9)
IMM GRANULOCYTES NFR BLD AUTO: 0.3 % (ref 0–0.9)
IMM GRANULOCYTES NFR BLD AUTO: 0.7 % (ref 0–0.9)
LDLC SERPL CALC-MCNC: 134 MG/DL
LV EJECT FRACT  99904: 60
LV EJECT FRACT MOD 2C 99903: 40.62
LV EJECT FRACT MOD 4C 99902: 67.98
LV EJECT FRACT MOD BP 99901: 52.95
LYMPHOCYTES # BLD AUTO: 1.98 K/UL (ref 1–4.8)
LYMPHOCYTES # BLD AUTO: 2.53 K/UL (ref 1–4.8)
LYMPHOCYTES # BLD AUTO: 2.91 K/UL (ref 1–4.8)
LYMPHOCYTES NFR BLD: 10.4 % (ref 22–41)
LYMPHOCYTES NFR BLD: 21.3 % (ref 22–41)
LYMPHOCYTES NFR BLD: 23.9 % (ref 22–41)
MAGNESIUM SERPL-MCNC: 2.2 MG/DL (ref 1.5–2.5)
MCH RBC QN AUTO: 27.5 PG (ref 27–33)
MCH RBC QN AUTO: 27.7 PG (ref 27–33)
MCH RBC QN AUTO: 27.7 PG (ref 27–33)
MCH RBC QN AUTO: 28.7 PG (ref 27–33)
MCH RBC QN AUTO: 29 PG (ref 27–33)
MCHC RBC AUTO-ENTMCNC: 30.9 G/DL (ref 32.3–36.5)
MCHC RBC AUTO-ENTMCNC: 31.6 G/DL (ref 32.3–36.5)
MCHC RBC AUTO-ENTMCNC: 31.7 G/DL (ref 32.3–36.5)
MCHC RBC AUTO-ENTMCNC: 32 G/DL (ref 32.3–36.5)
MCHC RBC AUTO-ENTMCNC: 32.2 G/DL (ref 32.3–36.5)
MCV RBC AUTO: 86.1 FL (ref 81.4–97.8)
MCV RBC AUTO: 86.2 FL (ref 81.4–97.8)
MCV RBC AUTO: 87.8 FL (ref 81.4–97.8)
MCV RBC AUTO: 90.6 FL (ref 81.4–97.8)
MCV RBC AUTO: 93.9 FL (ref 81.4–97.8)
MONOCYTES # BLD AUTO: 0.7 K/UL (ref 0–0.85)
MONOCYTES # BLD AUTO: 0.89 K/UL (ref 0–0.85)
MONOCYTES # BLD AUTO: 0.92 K/UL (ref 0–0.85)
MONOCYTES NFR BLD AUTO: 4.7 % (ref 0–13.4)
MONOCYTES NFR BLD AUTO: 5.9 % (ref 0–13.4)
MONOCYTES NFR BLD AUTO: 7.6 % (ref 0–13.4)
NEUTROPHILS # BLD AUTO: 15.92 K/UL (ref 1.82–7.42)
NEUTROPHILS # BLD AUTO: 8.17 K/UL (ref 1.82–7.42)
NEUTROPHILS # BLD AUTO: 8.49 K/UL (ref 1.82–7.42)
NEUTROPHILS NFR BLD: 67.2 % (ref 44–72)
NEUTROPHILS NFR BLD: 71.6 % (ref 44–72)
NEUTROPHILS NFR BLD: 83.8 % (ref 44–72)
NRBC # BLD AUTO: 0 K/UL
NRBC BLD-RTO: 0 /100 WBC (ref 0–0.2)
NT-PROBNP SERPL IA-MCNC: 2740 PG/ML (ref 0–125)
PATHOLOGY CONSULT NOTE: NORMAL
PLATELET # BLD AUTO: 463 K/UL (ref 164–446)
PLATELET # BLD AUTO: 518 K/UL (ref 164–446)
PLATELET # BLD AUTO: 553 K/UL (ref 164–446)
PLATELET # BLD AUTO: 568 K/UL (ref 164–446)
PLATELET # BLD AUTO: 571 K/UL (ref 164–446)
PMV BLD AUTO: 8.2 FL (ref 9–12.9)
PMV BLD AUTO: 8.3 FL (ref 9–12.9)
PMV BLD AUTO: 8.8 FL (ref 9–12.9)
PMV BLD AUTO: 8.9 FL (ref 9–12.9)
PMV BLD AUTO: 9.1 FL (ref 9–12.9)
POTASSIUM SERPL-SCNC: 4.1 MMOL/L (ref 3.6–5.5)
POTASSIUM SERPL-SCNC: 4.3 MMOL/L (ref 3.6–5.5)
POTASSIUM SERPL-SCNC: 4.8 MMOL/L (ref 3.6–5.5)
POTASSIUM SERPL-SCNC: 5 MMOL/L (ref 3.6–5.5)
PROT SERPL-MCNC: 8.1 G/DL (ref 6–8.2)
PSA SERPL-MCNC: 0.07 NG/ML (ref 0–4)
RBC # BLD AUTO: 4.26 M/UL (ref 4.7–6.1)
RBC # BLD AUTO: 4.26 M/UL (ref 4.7–6.1)
RBC # BLD AUTO: 4.32 M/UL (ref 4.7–6.1)
RBC # BLD AUTO: 4.62 M/UL (ref 4.7–6.1)
RBC # BLD AUTO: 4.88 M/UL (ref 4.7–6.1)
RHODAMINE-AURAMINE STN SPEC: NORMAL
SIGNIFICANT IND 70042: NORMAL
SITE SITE: NORMAL
SODIUM SERPL-SCNC: 126 MMOL/L (ref 135–145)
SODIUM SERPL-SCNC: 132 MMOL/L (ref 135–145)
SODIUM SERPL-SCNC: 134 MMOL/L (ref 135–145)
SODIUM SERPL-SCNC: 134 MMOL/L (ref 135–145)
SOURCE SOURCE: NORMAL
TRIGL SERPL-MCNC: 66 MG/DL (ref 0–149)
WBC # BLD AUTO: 11.9 K/UL (ref 4.8–10.8)
WBC # BLD AUTO: 12.2 K/UL (ref 4.8–10.8)
WBC # BLD AUTO: 19 K/UL (ref 4.8–10.8)
WBC # BLD AUTO: 24.6 K/UL (ref 4.8–10.8)
WBC # BLD AUTO: 27.6 K/UL (ref 4.8–10.8)

## 2023-01-01 PROCEDURE — 87015 SPECIMEN INFECT AGNT CONCNTJ: CPT

## 2023-01-01 PROCEDURE — 31624 DX BRONCHOSCOPE/LAVAGE: CPT | Performed by: INTERNAL MEDICINE

## 2023-01-01 PROCEDURE — 80048 BASIC METABOLIC PNL TOTAL CA: CPT

## 2023-01-01 PROCEDURE — 99214 OFFICE O/P EST MOD 30 MIN: CPT | Performed by: INTERNAL MEDICINE

## 2023-01-01 PROCEDURE — 94760 N-INVAS EAR/PLS OXIMETRY 1: CPT

## 2023-01-01 PROCEDURE — 700111 HCHG RX REV CODE 636 W/ 250 OVERRIDE (IP): Mod: JZ | Performed by: HOSPITALIST

## 2023-01-01 PROCEDURE — RXMED WILLOW AMBULATORY MEDICATION CHARGE: Performed by: PHYSICIAN ASSISTANT

## 2023-01-01 PROCEDURE — 160048 HCHG OR STATISTICAL LEVEL 1-5: Performed by: INTERNAL MEDICINE

## 2023-01-01 PROCEDURE — 93005 ELECTROCARDIOGRAM TRACING: CPT | Performed by: INTERNAL MEDICINE

## 2023-01-01 PROCEDURE — 3074F SYST BP LT 130 MM HG: CPT | Performed by: PHYSICIAN ASSISTANT

## 2023-01-01 PROCEDURE — 31625 BRONCHOSCOPY W/BIOPSY(S): CPT | Performed by: INTERNAL MEDICINE

## 2023-01-01 PROCEDURE — 87205 SMEAR GRAM STAIN: CPT

## 2023-01-01 PROCEDURE — 99214 OFFICE O/P EST MOD 30 MIN: CPT | Performed by: PHYSICIAN ASSISTANT

## 2023-01-01 PROCEDURE — 85025 COMPLETE CBC W/AUTO DIFF WBC: CPT

## 2023-01-01 PROCEDURE — 99213 OFFICE O/P EST LOW 20 MIN: CPT | Performed by: PHYSICIAN ASSISTANT

## 2023-01-01 PROCEDURE — 84153 ASSAY OF PSA TOTAL: CPT

## 2023-01-01 PROCEDURE — 99223 1ST HOSP IP/OBS HIGH 75: CPT | Mod: 25,AI | Performed by: HOSPITALIST

## 2023-01-01 PROCEDURE — 3078F DIAST BP <80 MM HG: CPT | Performed by: PHYSICIAN ASSISTANT

## 2023-01-01 PROCEDURE — 93306 TTE W/DOPPLER COMPLETE: CPT | Mod: 26 | Performed by: INTERNAL MEDICINE

## 2023-01-01 PROCEDURE — 94060 EVALUATION OF WHEEZING: CPT | Performed by: INTERNAL MEDICINE

## 2023-01-01 PROCEDURE — 88342 IMHCHEM/IMCYTCHM 1ST ANTB: CPT

## 2023-01-01 PROCEDURE — 88341 IMHCHEM/IMCYTCHM EA ADD ANTB: CPT | Mod: 91

## 2023-01-01 PROCEDURE — 87206 SMEAR FLUORESCENT/ACID STAI: CPT

## 2023-01-01 PROCEDURE — 36415 COLL VENOUS BLD VENIPUNCTURE: CPT

## 2023-01-01 PROCEDURE — 87102 FUNGUS ISOLATION CULTURE: CPT

## 2023-01-01 PROCEDURE — 94726 PLETHYSMOGRAPHY LUNG VOLUMES: CPT | Performed by: INTERNAL MEDICINE

## 2023-01-01 PROCEDURE — 88305 TISSUE EXAM BY PATHOLOGIST: CPT | Mod: 59

## 2023-01-01 PROCEDURE — 99212 OFFICE O/P EST SF 10 MIN: CPT | Performed by: INTERNAL MEDICINE

## 2023-01-01 PROCEDURE — 83880 ASSAY OF NATRIURETIC PEPTIDE: CPT

## 2023-01-01 PROCEDURE — RXMED WILLOW AMBULATORY MEDICATION CHARGE: Performed by: INTERNAL MEDICINE

## 2023-01-01 PROCEDURE — 88333 PATH CONSLTJ SURG CYTO XM 1: CPT

## 2023-01-01 PROCEDURE — 88112 CYTOPATH CELL ENHANCE TECH: CPT

## 2023-01-01 PROCEDURE — 99407 BEHAV CHNG SMOKING > 10 MIN: CPT | Performed by: HOSPITALIST

## 2023-01-01 PROCEDURE — 160029 HCHG SURGERY MINUTES - 1ST 30 MINS LEVEL 4: Performed by: INTERNAL MEDICINE

## 2023-01-01 PROCEDURE — 80061 LIPID PANEL: CPT

## 2023-01-01 PROCEDURE — 3078F DIAST BP <80 MM HG: CPT | Performed by: INTERNAL MEDICINE

## 2023-01-01 PROCEDURE — 99212 OFFICE O/P EST SF 10 MIN: CPT | Performed by: STUDENT IN AN ORGANIZED HEALTH CARE EDUCATION/TRAINING PROGRAM

## 2023-01-01 PROCEDURE — 3074F SYST BP LT 130 MM HG: CPT | Performed by: INTERNAL MEDICINE

## 2023-01-01 PROCEDURE — 700105 HCHG RX REV CODE 258: Performed by: INTERNAL MEDICINE

## 2023-01-01 PROCEDURE — 07974ZX DRAINAGE OF THORAX LYMPHATIC, PERCUTANEOUS ENDOSCOPIC APPROACH, DIAGNOSTIC: ICD-10-PCS | Performed by: INTERNAL MEDICINE

## 2023-01-01 PROCEDURE — 700102 HCHG RX REV CODE 250 W/ 637 OVERRIDE(OP): Performed by: HOSPITALIST

## 2023-01-01 PROCEDURE — 85027 COMPLETE CBC AUTOMATED: CPT

## 2023-01-01 PROCEDURE — 87070 CULTURE OTHR SPECIMN AEROBIC: CPT

## 2023-01-01 PROCEDURE — 700101 HCHG RX REV CODE 250: Performed by: ANESTHESIOLOGY

## 2023-01-01 PROCEDURE — 83735 ASSAY OF MAGNESIUM: CPT

## 2023-01-01 PROCEDURE — 93010 ELECTROCARDIOGRAM REPORT: CPT | Performed by: INTERNAL MEDICINE

## 2023-01-01 PROCEDURE — 88177 CYTP FNA EVAL EA ADDL: CPT

## 2023-01-01 PROCEDURE — 0B9D8ZX DRAINAGE OF RIGHT MIDDLE LUNG LOBE, VIA NATURAL OR ARTIFICIAL OPENING ENDOSCOPIC, DIAGNOSTIC: ICD-10-PCS | Performed by: INTERNAL MEDICINE

## 2023-01-01 PROCEDURE — 94729 DIFFUSING CAPACITY: CPT | Performed by: INTERNAL MEDICINE

## 2023-01-01 PROCEDURE — 99204 OFFICE O/P NEW MOD 45 MIN: CPT | Performed by: STUDENT IN AN ORGANIZED HEALTH CARE EDUCATION/TRAINING PROGRAM

## 2023-01-01 PROCEDURE — 80053 COMPREHEN METABOLIC PANEL: CPT

## 2023-01-01 PROCEDURE — 1126F AMNT PAIN NOTED NONE PRSNT: CPT | Performed by: INTERNAL MEDICINE

## 2023-01-01 PROCEDURE — 160009 HCHG ANES TIME/MIN: Performed by: INTERNAL MEDICINE

## 2023-01-01 PROCEDURE — 93306 TTE W/DOPPLER COMPLETE: CPT

## 2023-01-01 PROCEDURE — 99214 OFFICE O/P EST MOD 30 MIN: CPT

## 2023-01-01 PROCEDURE — 88172 CYTP DX EVAL FNA 1ST EA SITE: CPT | Mod: 91

## 2023-01-01 PROCEDURE — 94729 DIFFUSING CAPACITY: CPT | Mod: 26 | Performed by: INTERNAL MEDICINE

## 2023-01-01 PROCEDURE — 700111 HCHG RX REV CODE 636 W/ 250 OVERRIDE (IP): Performed by: INTERNAL MEDICINE

## 2023-01-01 PROCEDURE — 31653 BRONCH EBUS SAMPLNG 3/> NODE: CPT | Performed by: INTERNAL MEDICINE

## 2023-01-01 PROCEDURE — 99239 HOSP IP/OBS DSCHRG MGMT >30: CPT | Performed by: INTERNAL MEDICINE

## 2023-01-01 PROCEDURE — 71250 CT THORAX DX C-: CPT

## 2023-01-01 PROCEDURE — 94726 PLETHYSMOGRAPHY LUNG VOLUMES: CPT | Mod: 26 | Performed by: INTERNAL MEDICINE

## 2023-01-01 PROCEDURE — 160002 HCHG RECOVERY MINUTES (STAT): Performed by: INTERNAL MEDICINE

## 2023-01-01 PROCEDURE — 160035 HCHG PACU - 1ST 60 MINS PHASE I: Performed by: INTERNAL MEDICINE

## 2023-01-01 PROCEDURE — 94640 AIRWAY INHALATION TREATMENT: CPT

## 2023-01-01 PROCEDURE — 770020 HCHG ROOM/CARE - TELE (206)

## 2023-01-01 PROCEDURE — 87116 MYCOBACTERIA CULTURE: CPT

## 2023-01-01 PROCEDURE — 88173 CYTOPATH EVAL FNA REPORT: CPT | Mod: 91

## 2023-01-01 PROCEDURE — 94060 EVALUATION OF WHEEZING: CPT | Mod: 26 | Performed by: INTERNAL MEDICINE

## 2023-01-01 PROCEDURE — 700111 HCHG RX REV CODE 636 W/ 250 OVERRIDE (IP): Performed by: ANESTHESIOLOGY

## 2023-01-01 PROCEDURE — 160041 HCHG SURGERY MINUTES - EA ADDL 1 MIN LEVEL 4: Performed by: INTERNAL MEDICINE

## 2023-01-01 RX ORDER — PHENYLEPHRINE HCL IN 0.9% NACL 0.5 MG/5ML
SYRINGE (ML) INTRAVENOUS PRN
Status: DISCONTINUED | OUTPATIENT
Start: 2023-01-01 | End: 2023-01-01 | Stop reason: SURG

## 2023-01-01 RX ORDER — FLUTICASONE PROPIONATE AND SALMETEROL 100; 50 UG/1; UG/1
1 POWDER RESPIRATORY (INHALATION) EVERY 12 HOURS
Qty: 180 EACH | Refills: 3 | Status: SHIPPED | OUTPATIENT
Start: 2023-01-01 | End: 2023-01-01 | Stop reason: SDUPTHER

## 2023-01-01 RX ORDER — AZITHROMYCIN 250 MG/1
TABLET, FILM COATED ORAL
Qty: 6 TABLET | Refills: 0 | Status: SHIPPED | OUTPATIENT
Start: 2023-01-01 | End: 2023-01-01

## 2023-01-01 RX ORDER — OXYCODONE HYDROCHLORIDE 5 MG/1
5 TABLET ORAL
Status: DISCONTINUED | OUTPATIENT
Start: 2023-01-01 | End: 2023-01-01 | Stop reason: HOSPADM

## 2023-01-01 RX ORDER — DIPHENHYDRAMINE HYDROCHLORIDE 50 MG/ML
12.5 INJECTION INTRAMUSCULAR; INTRAVENOUS
Status: DISCONTINUED | OUTPATIENT
Start: 2023-01-01 | End: 2023-01-01 | Stop reason: HOSPADM

## 2023-01-01 RX ORDER — FLUTICASONE PROPIONATE AND SALMETEROL 100; 50 UG/1; UG/1
1 POWDER RESPIRATORY (INHALATION) EVERY 12 HOURS
COMMUNITY
Start: 2022-12-05 | End: 2023-01-01 | Stop reason: SDUPTHER

## 2023-01-01 RX ORDER — LIDOCAINE HYDROCHLORIDE 40 MG/ML
SOLUTION TOPICAL PRN
Status: DISCONTINUED | OUTPATIENT
Start: 2023-01-01 | End: 2023-01-01 | Stop reason: SURG

## 2023-01-01 RX ORDER — IPRATROPIUM BROMIDE AND ALBUTEROL SULFATE 2.5; .5 MG/3ML; MG/3ML
3 SOLUTION RESPIRATORY (INHALATION)
Status: DISCONTINUED | OUTPATIENT
Start: 2023-01-01 | End: 2023-01-01 | Stop reason: HOSPADM

## 2023-01-01 RX ORDER — IPRATROPIUM BROMIDE AND ALBUTEROL SULFATE 2.5; .5 MG/3ML; MG/3ML
3 SOLUTION RESPIRATORY (INHALATION) ONCE
Status: COMPLETED | OUTPATIENT
Start: 2023-01-01 | End: 2023-01-01

## 2023-01-01 RX ORDER — HALOPERIDOL 5 MG/ML
1 INJECTION INTRAMUSCULAR
Status: DISCONTINUED | OUTPATIENT
Start: 2023-01-01 | End: 2023-01-01 | Stop reason: HOSPADM

## 2023-01-01 RX ORDER — ALBUTEROL SULFATE 90 UG/1
2 AEROSOL, METERED RESPIRATORY (INHALATION) EVERY 6 HOURS PRN
Qty: 8 G | Refills: 11 | Status: SHIPPED | OUTPATIENT
Start: 2023-01-01 | End: 2024-01-01 | Stop reason: SDUPTHER

## 2023-01-01 RX ORDER — AMOXICILLIN 250 MG
2 CAPSULE ORAL 2 TIMES DAILY
Status: DISCONTINUED | OUTPATIENT
Start: 2023-01-01 | End: 2023-01-01 | Stop reason: HOSPADM

## 2023-01-01 RX ORDER — PREDNISONE 20 MG/1
40 TABLET ORAL ONCE
Status: COMPLETED | OUTPATIENT
Start: 2023-01-01 | End: 2023-01-01

## 2023-01-01 RX ORDER — EPHEDRINE SULFATE 50 MG/ML
INJECTION, SOLUTION INTRAVENOUS PRN
Status: DISCONTINUED | OUTPATIENT
Start: 2023-01-01 | End: 2023-01-01 | Stop reason: SURG

## 2023-01-01 RX ORDER — SODIUM CHLORIDE, SODIUM LACTATE, POTASSIUM CHLORIDE, CALCIUM CHLORIDE 600; 310; 30; 20 MG/100ML; MG/100ML; MG/100ML; MG/100ML
INJECTION, SOLUTION INTRAVENOUS CONTINUOUS
Status: DISCONTINUED | OUTPATIENT
Start: 2023-01-01 | End: 2023-01-01 | Stop reason: HOSPADM

## 2023-01-01 RX ORDER — FLUTICASONE PROPIONATE AND SALMETEROL 100; 50 UG/1; UG/1
1 POWDER RESPIRATORY (INHALATION) EVERY 12 HOURS
COMMUNITY
End: 2024-01-01 | Stop reason: SDUPTHER

## 2023-01-01 RX ORDER — ACETAMINOPHEN 325 MG/1
650 TABLET ORAL EVERY 6 HOURS PRN
Status: DISCONTINUED | OUTPATIENT
Start: 2023-01-01 | End: 2023-01-01 | Stop reason: HOSPADM

## 2023-01-01 RX ORDER — MIDAZOLAM HYDROCHLORIDE 1 MG/ML
INJECTION INTRAMUSCULAR; INTRAVENOUS PRN
Status: DISCONTINUED | OUTPATIENT
Start: 2023-01-01 | End: 2023-01-01 | Stop reason: SURG

## 2023-01-01 RX ORDER — OXYCODONE HYDROCHLORIDE 5 MG/1
2.5 TABLET ORAL
Status: DISCONTINUED | OUTPATIENT
Start: 2023-01-01 | End: 2023-01-01 | Stop reason: HOSPADM

## 2023-01-01 RX ORDER — ONDANSETRON 2 MG/ML
INJECTION INTRAMUSCULAR; INTRAVENOUS PRN
Status: DISCONTINUED | OUTPATIENT
Start: 2023-01-01 | End: 2023-01-01 | Stop reason: SURG

## 2023-01-01 RX ORDER — MORPHINE SULFATE 4 MG/ML
2 INJECTION INTRAVENOUS
Status: DISCONTINUED | OUTPATIENT
Start: 2023-01-01 | End: 2023-01-01 | Stop reason: HOSPADM

## 2023-01-01 RX ORDER — FLUTICASONE FUROATE, UMECLIDINIUM BROMIDE AND VILANTEROL TRIFENATATE 100; 62.5; 25 UG/1; UG/1; UG/1
1 POWDER RESPIRATORY (INHALATION) DAILY
Qty: 2 EACH | Refills: 0 | Status: SHIPPED | OUTPATIENT
Start: 2023-01-01 | End: 2023-01-01

## 2023-01-01 RX ORDER — DEXAMETHASONE SODIUM PHOSPHATE 4 MG/ML
INJECTION, SOLUTION INTRA-ARTICULAR; INTRALESIONAL; INTRAMUSCULAR; INTRAVENOUS; SOFT TISSUE PRN
Status: DISCONTINUED | OUTPATIENT
Start: 2023-01-01 | End: 2023-01-01 | Stop reason: SURG

## 2023-01-01 RX ORDER — BISACODYL 10 MG
10 SUPPOSITORY, RECTAL RECTAL
Status: DISCONTINUED | OUTPATIENT
Start: 2023-01-01 | End: 2023-01-01 | Stop reason: HOSPADM

## 2023-01-01 RX ORDER — FLUTICASONE FUROATE, UMECLIDINIUM BROMIDE AND VILANTEROL TRIFENATATE 100; 62.5; 25 UG/1; UG/1; UG/1
1 POWDER RESPIRATORY (INHALATION) DAILY
Qty: 1 EACH | Refills: 6 | Status: SHIPPED | OUTPATIENT
Start: 2023-01-01 | End: 2023-01-01 | Stop reason: SDUPTHER

## 2023-01-01 RX ORDER — LABETALOL HYDROCHLORIDE 5 MG/ML
10 INJECTION, SOLUTION INTRAVENOUS EVERY 4 HOURS PRN
Status: DISCONTINUED | OUTPATIENT
Start: 2023-01-01 | End: 2023-01-01 | Stop reason: HOSPADM

## 2023-01-01 RX ORDER — ENOXAPARIN SODIUM 100 MG/ML
40 INJECTION SUBCUTANEOUS DAILY
Status: DISCONTINUED | OUTPATIENT
Start: 2023-01-01 | End: 2023-01-01 | Stop reason: HOSPADM

## 2023-01-01 RX ORDER — ROCURONIUM BROMIDE 10 MG/ML
INJECTION, SOLUTION INTRAVENOUS PRN
Status: DISCONTINUED | OUTPATIENT
Start: 2023-01-01 | End: 2023-01-01 | Stop reason: SURG

## 2023-01-01 RX ORDER — ONDANSETRON 4 MG/1
4 TABLET, ORALLY DISINTEGRATING ORAL EVERY 4 HOURS PRN
Status: DISCONTINUED | OUTPATIENT
Start: 2023-01-01 | End: 2023-01-01 | Stop reason: HOSPADM

## 2023-01-01 RX ORDER — POLYETHYLENE GLYCOL 3350 17 G/17G
1 POWDER, FOR SOLUTION ORAL
Status: DISCONTINUED | OUTPATIENT
Start: 2023-01-01 | End: 2023-01-01 | Stop reason: HOSPADM

## 2023-01-01 RX ORDER — FLUTICASONE FUROATE, UMECLIDINIUM BROMIDE AND VILANTEROL TRIFENATATE 100; 62.5; 25 UG/1; UG/1; UG/1
1 POWDER RESPIRATORY (INHALATION) DAILY
Qty: 1 EACH | Refills: 6 | Status: CANCELLED | OUTPATIENT
Start: 2023-01-01

## 2023-01-01 RX ORDER — FLUTICASONE FUROATE, UMECLIDINIUM BROMIDE AND VILANTEROL TRIFENATATE 100; 62.5; 25 UG/1; UG/1; UG/1
1 POWDER RESPIRATORY (INHALATION) DAILY
Qty: 180 EACH | Refills: 3 | Status: SHIPPED | OUTPATIENT
Start: 2023-01-01 | End: 2024-01-01

## 2023-01-01 RX ORDER — ONDANSETRON 2 MG/ML
4 INJECTION INTRAMUSCULAR; INTRAVENOUS
Status: DISCONTINUED | OUTPATIENT
Start: 2023-01-01 | End: 2023-01-01 | Stop reason: HOSPADM

## 2023-01-01 RX ORDER — FLUTICASONE FUROATE, UMECLIDINIUM BROMIDE AND VILANTEROL TRIFENATATE 100; 62.5; 25 UG/1; UG/1; UG/1
1 POWDER RESPIRATORY (INHALATION) DAILY
Qty: 1 EACH | Refills: 6 | Status: SHIPPED | OUTPATIENT
Start: 2023-01-01 | End: 2023-01-01

## 2023-01-01 RX ORDER — MOXIFLOXACIN 5 MG/ML
1 SOLUTION/ DROPS OPHTHALMIC 3 TIMES DAILY
Qty: 2 ML | Refills: 0 | Status: SHIPPED | OUTPATIENT
Start: 2023-01-01 | End: 2023-01-01

## 2023-01-01 RX ORDER — POLYMYXIN B SULFATE AND TRIMETHOPRIM 1; 10000 MG/ML; [USP'U]/ML
SOLUTION OPHTHALMIC
Qty: 10 ML | Refills: 0 | Status: CANCELLED | OUTPATIENT
Start: 2023-01-01

## 2023-01-01 RX ORDER — FLUTICASONE PROPIONATE AND SALMETEROL 100; 50 UG/1; UG/1
1 POWDER RESPIRATORY (INHALATION) EVERY 12 HOURS
Qty: 180 EACH | Refills: 3 | Status: SHIPPED | OUTPATIENT
Start: 2023-01-01 | End: 2023-01-01

## 2023-01-01 RX ORDER — POLYMYXIN B SULFATE AND TRIMETHOPRIM 1; 10000 MG/ML; [USP'U]/ML
1 SOLUTION OPHTHALMIC EVERY 4 HOURS
Qty: 4 ML | Refills: 0 | Status: SHIPPED | OUTPATIENT
Start: 2023-01-01 | End: 2023-01-01

## 2023-01-01 RX ORDER — PREDNISONE 20 MG/1
40 TABLET ORAL DAILY
Qty: 8 TABLET | Refills: 0 | Status: SHIPPED | OUTPATIENT
Start: 2023-01-01 | End: 2023-01-01

## 2023-01-01 RX ORDER — FLUTICASONE FUROATE, UMECLIDINIUM BROMIDE AND VILANTEROL TRIFENATATE 100; 62.5; 25 UG/1; UG/1; UG/1
1 POWDER RESPIRATORY (INHALATION) DAILY
Qty: 2 EACH | Refills: 0 | COMMUNITY
Start: 2023-01-01 | End: 2023-01-01 | Stop reason: SDUPTHER

## 2023-01-01 RX ORDER — LIDOCAINE HYDROCHLORIDE 20 MG/ML
INJECTION, SOLUTION EPIDURAL; INFILTRATION; INTRACAUDAL; PERINEURAL PRN
Status: DISCONTINUED | OUTPATIENT
Start: 2023-01-01 | End: 2023-01-01 | Stop reason: SURG

## 2023-01-01 RX ORDER — PREDNISONE 10 MG/1
40 TABLET ORAL 2 TIMES DAILY
Qty: 40 TABLET | Refills: 0 | Status: SHIPPED | OUTPATIENT
Start: 2023-01-01 | End: 2023-01-01

## 2023-01-01 RX ORDER — ALBUTEROL SULFATE 90 UG/1
2 AEROSOL, METERED RESPIRATORY (INHALATION) EVERY 6 HOURS PRN
Status: DISCONTINUED | OUTPATIENT
Start: 2023-01-01 | End: 2023-01-01 | Stop reason: HOSPADM

## 2023-01-01 RX ORDER — ONDANSETRON 2 MG/ML
4 INJECTION INTRAMUSCULAR; INTRAVENOUS EVERY 4 HOURS PRN
Status: DISCONTINUED | OUTPATIENT
Start: 2023-01-01 | End: 2023-01-01 | Stop reason: HOSPADM

## 2023-01-01 RX ORDER — SODIUM CHLORIDE, SODIUM LACTATE, POTASSIUM CHLORIDE, CALCIUM CHLORIDE 600; 310; 30; 20 MG/100ML; MG/100ML; MG/100ML; MG/100ML
INJECTION, SOLUTION INTRAVENOUS CONTINUOUS
Status: ACTIVE | OUTPATIENT
Start: 2023-01-01 | End: 2023-01-01

## 2023-01-01 RX ADMIN — PREDNISONE 40 MG: 20 TABLET ORAL at 15:00

## 2023-01-01 RX ADMIN — PROPOFOL 100 MG: 10 INJECTION, EMULSION INTRAVENOUS at 15:45

## 2023-01-01 RX ADMIN — ROCURONIUM BROMIDE 50 MG: 50 INJECTION, SOLUTION INTRAVENOUS at 15:45

## 2023-01-01 RX ADMIN — Medication 200 MCG: at 15:51

## 2023-01-01 RX ADMIN — ONDANSETRON 4 MG: 2 INJECTION INTRAMUSCULAR; INTRAVENOUS at 16:00

## 2023-01-01 RX ADMIN — DEXAMETHASONE SODIUM PHOSPHATE 10 MG: 4 INJECTION INTRA-ARTICULAR; INTRALESIONAL; INTRAMUSCULAR; INTRAVENOUS; SOFT TISSUE at 15:56

## 2023-01-01 RX ADMIN — SUGAMMADEX 200 MG: 100 INJECTION, SOLUTION INTRAVENOUS at 16:52

## 2023-01-01 RX ADMIN — LIDOCAINE HYDROCHLORIDE 80 MG: 20 INJECTION, SOLUTION EPIDURAL; INFILTRATION; INTRACAUDAL at 15:45

## 2023-01-01 RX ADMIN — SODIUM CHLORIDE, POTASSIUM CHLORIDE, SODIUM LACTATE AND CALCIUM CHLORIDE: 600; 310; 30; 20 INJECTION, SOLUTION INTRAVENOUS at 15:42

## 2023-01-01 RX ADMIN — ENOXAPARIN SODIUM 40 MG: 100 INJECTION SUBCUTANEOUS at 19:52

## 2023-01-01 RX ADMIN — EPHEDRINE SULFATE 10 MG: 50 INJECTION, SOLUTION INTRAVENOUS at 15:58

## 2023-01-01 RX ADMIN — FLUTICASONE FUROATE, UMECLIDINIUM BROMIDE AND VILANTEROL TRIFENATATE 1 PUFF: 100; 62.5; 25 POWDER RESPIRATORY (INHALATION) at 07:00

## 2023-01-01 RX ADMIN — IPRATROPIUM BROMIDE AND ALBUTEROL SULFATE 3 ML: 2.5; .5 SOLUTION RESPIRATORY (INHALATION) at 09:18

## 2023-01-01 RX ADMIN — MIDAZOLAM HYDROCHLORIDE 0.5 MG: 1 INJECTION, SOLUTION INTRAMUSCULAR; INTRAVENOUS at 15:45

## 2023-01-01 RX ADMIN — Medication 200 MCG: at 15:58

## 2023-01-01 RX ADMIN — FENTANYL CITRATE 25 MCG: 50 INJECTION, SOLUTION INTRAMUSCULAR; INTRAVENOUS at 16:38

## 2023-01-01 RX ADMIN — LIDOCAINE HYDROCHLORIDE 4 ML: 40 SOLUTION TOPICAL at 15:50

## 2023-01-01 RX ADMIN — FENTANYL CITRATE 50 MCG: 50 INJECTION, SOLUTION INTRAMUSCULAR; INTRAVENOUS at 15:45

## 2023-01-01 ASSESSMENT — PULMONARY FUNCTION TESTS
FEV1/FVC_PERCENT_PREDICTED: 53
FEV1: 1.3
FEV1: 1.08
FEV1/FVC_PERCENT_CHANGE: 164
FEV1/FVC_PERCENT_PREDICTED: 52
FEV1/FVC: 40.5
FEV1/FVC_PERCENT_PREDICTED: 76
FVC_PERCENT_PREDICTED: 83
FEV1/FVC_PERCENT_CHANGE: 118
FEV1/FVC_PREDICTED: 76
FEV1_PERCENT_CHANGE: 17
FEV1_PREDICTED: 2.49
FEV1/FVC: 40
FEV1/FVC_PERCENT_PREDICTED: 52
FEV1/FVC_PERCENT_PREDICTED: 53
FVC: 3.21
FVC: 2.72
FEV1_PERCENT_CHANGE: 20
FEV1_PERCENT_PREDICTED: 43
FVC_LLN: 2.73
FEV1_LLN: 2.08
FEV1/FVC: 41
FEV1_PERCENT_PREDICTED: 52
FEV1/FVC: 40
FVC_PERCENT_PREDICTED: 98
FEV1/FVC_PERCENT_LLN: .53
FVC_PREDICTED: 3.27

## 2023-01-01 ASSESSMENT — ENCOUNTER SYMPTOMS
HEADACHES: 0
DOUBLE VISION: 0
STRIDOR: 0
DIAPHORESIS: 0
SPUTUM PRODUCTION: 1
DOUBLE VISION: 0
FLANK PAIN: 0
SHORTNESS OF BREATH: 1
ABDOMINAL PAIN: 0
TREMORS: 0
WEAKNESS: 1
WEAKNESS: 0
SPUTUM PRODUCTION: 1
BACK PAIN: 0
FEVER: 1
DIZZINESS: 0
EYES NEGATIVE: 1
NAUSEA: 0
BLURRED VISION: 0
EYE DISCHARGE: 1
DIZZINESS: 0
NAUSEA: 0
NECK PAIN: 0
SINUS PAIN: 0
SINUS PAIN: 1
PHOTOPHOBIA: 0
DEPRESSION: 0
BRUISES/BLEEDS EASILY: 0
PALPITATIONS: 0
EYE REDNESS: 0
WEIGHT LOSS: 0
MEMORY LOSS: 0
PHOTOPHOBIA: 0
HEARTBURN: 0
COUGH: 1
ORTHOPNEA: 0
CHILLS: 0
ABDOMINAL PAIN: 0
CONSTIPATION: 0
CHILLS: 0
HEADACHES: 0
PHOTOPHOBIA: 0
PALPITATIONS: 0
BLOOD IN STOOL: 0
GASTROINTESTINAL NEGATIVE: 1
DEPRESSION: 0
PSYCHIATRIC NEGATIVE: 1
FOCAL WEAKNESS: 0
DIAPHORESIS: 0
HEADACHES: 0
DIARRHEA: 0
COUGH: 1
FOCAL WEAKNESS: 0
ORTHOPNEA: 0
SINUS PAIN: 0
SHORTNESS OF BREATH: 1
DIARRHEA: 0
FEVER: 0
COUGH: 1
DEPRESSION: 0
DIZZINESS: 0
BLURRED VISION: 0
INSOMNIA: 0
FEVER: 0
CLAUDICATION: 0
WEIGHT LOSS: 1
FEVER: 0
PSYCHIATRIC NEGATIVE: 1
BLURRED VISION: 0
SPEECH CHANGE: 0
SPUTUM PRODUCTION: 0
SHORTNESS OF BREATH: 1
SHORTNESS OF BREATH: 1
WHEEZING: 0
HEADACHES: 0
MUSCULOSKELETAL NEGATIVE: 1
STRIDOR: 0
TREMORS: 0
DOUBLE VISION: 0
EYE PAIN: 0
EYE DISCHARGE: 0
SEIZURES: 0
BACK PAIN: 0
ABDOMINAL PAIN: 0
HEARTBURN: 0
EYE DISCHARGE: 0
ABDOMINAL PAIN: 0
POLYDIPSIA: 0
PND: 0
MYALGIAS: 0
SORE THROAT: 0
HEARTBURN: 0
CONSTITUTIONAL NEGATIVE: 1
SENSORY CHANGE: 0
EYE PAIN: 0
MYALGIAS: 1
EYE REDNESS: 0
TINGLING: 0
NAUSEA: 0
VOMITING: 0
WHEEZING: 0
DIARRHEA: 0
FALLS: 0
MYALGIAS: 0
COUGH: 0
VOMITING: 0
COUGH: 1
CONSTIPATION: 0
CHILLS: 1
CHILLS: 0
SHORTNESS OF BREATH: 0
ORTHOPNEA: 0
FOCAL WEAKNESS: 0
VOMITING: 0
SENSORY CHANGE: 0
HEADACHES: 0
SORE THROAT: 0
NAUSEA: 0
FEVER: 0
WHEEZING: 0
BRUISES/BLEEDS EASILY: 0
SORE THROAT: 0
NEUROLOGICAL NEGATIVE: 1
FALLS: 0
PALPITATIONS: 0
RHINORRHEA: 1
MYALGIAS: 0
TINGLING: 0
NECK PAIN: 0
PND: 0
VOMITING: 0
HEMOPTYSIS: 0
EYE REDNESS: 1
CHILLS: 0

## 2023-01-01 ASSESSMENT — LIFESTYLE VARIABLES
TOTAL SCORE: 0
TOTAL SCORE: 0
ALCOHOL_USE: NO
HAVE YOU EVER FELT YOU SHOULD CUT DOWN ON YOUR DRINKING: NO
HOW MANY TIMES IN THE PAST YEAR HAVE YOU HAD 5 OR MORE DRINKS IN A DAY: 0
CONSUMPTION TOTAL: NEGATIVE
SUBSTANCE_ABUSE: 0
ON A TYPICAL DAY WHEN YOU DRINK ALCOHOL HOW MANY DRINKS DO YOU HAVE: 0
EVER FELT BAD OR GUILTY ABOUT YOUR DRINKING: NO
AVERAGE NUMBER OF DAYS PER WEEK YOU HAVE A DRINK CONTAINING ALCOHOL: 0
HAVE PEOPLE ANNOYED YOU BY CRITICIZING YOUR DRINKING: NO
EVER HAD A DRINK FIRST THING IN THE MORNING TO STEADY YOUR NERVES TO GET RID OF A HANGOVER: NO
TOTAL SCORE: 0

## 2023-01-01 ASSESSMENT — PATIENT HEALTH QUESTIONNAIRE - PHQ9
CLINICAL INTERPRETATION OF PHQ2 SCORE: 0
1. LITTLE INTEREST OR PLEASURE IN DOING THINGS: NOT AT ALL
SUM OF ALL RESPONSES TO PHQ9 QUESTIONS 1 AND 2: 0
2. FEELING DOWN, DEPRESSED, IRRITABLE, OR HOPELESS: NOT AT ALL

## 2023-01-01 ASSESSMENT — FIBROSIS 4 INDEX
FIB4 SCORE: 0.53
FIB4 SCORE: 0.52
FIB4 SCORE: 0.52
FIB4 SCORE: 1.54
FIB4 SCORE: 1.54
FIB4 SCORE: 0.52
FIB4 SCORE: 1.54

## 2023-01-01 ASSESSMENT — COGNITIVE AND FUNCTIONAL STATUS - GENERAL
SUGGESTED CMS G CODE MODIFIER MOBILITY: CK
MOVING TO AND FROM BED TO CHAIR: A LITTLE
CLIMB 3 TO 5 STEPS WITH RAILING: A LITTLE
TOILETING: A LITTLE
HELP NEEDED FOR BATHING: A LITTLE
SUGGESTED CMS G CODE MODIFIER DAILY ACTIVITY: CJ
STANDING UP FROM CHAIR USING ARMS: A LITTLE
DAILY ACTIVITIY SCORE: 22
MOBILITY SCORE: 19
MOVING FROM LYING ON BACK TO SITTING ON SIDE OF FLAT BED: A LITTLE
WALKING IN HOSPITAL ROOM: A LITTLE

## 2023-01-01 ASSESSMENT — PAIN SCALES - GENERAL: PAIN_LEVEL: 0

## 2023-01-01 ASSESSMENT — COPD QUESTIONNAIRES: COPD: 1

## 2023-01-01 ASSESSMENT — PAIN DESCRIPTION - PAIN TYPE
TYPE: ACUTE PAIN
TYPE: ACUTE PAIN

## 2023-02-27 PROBLEM — B07.9 WART OF FACE: Status: RESOLVED | Noted: 2022-03-30 | Resolved: 2023-01-01

## 2023-02-27 PROBLEM — L30.9 ECZEMA: Status: ACTIVE | Noted: 2017-07-13

## 2023-02-27 PROBLEM — M25.512 ACUTE PAIN OF LEFT SHOULDER: Status: ACTIVE | Noted: 2023-01-01

## 2023-02-27 NOTE — ASSESSMENT & PLAN NOTE
Diagnosed in the past years ago with eczema.  Uses a steroid cream mixed with over-the-counter creams which is effective.  Currently has exacerbation of the rash on his lower left leg.

## 2023-02-27 NOTE — ASSESSMENT & PLAN NOTE
Chronic condition.  Continues to smoke about a half a pack a day.  Requesting a refill today of Advair.  Medication is helpful.  Uses his rescue inhaler less.

## 2023-02-27 NOTE — ASSESSMENT & PLAN NOTE
This is a pleasant 77-year-old male here today to follow-up on his health.  Recently had more ulcerations on his left foot.  He had them previously on both of his feet.  Was using a cream that I prescribed in the past and it cleared up.

## 2023-02-27 NOTE — ASSESSMENT & PLAN NOTE
Cholesterols been elevated in the past.  Above 100 with his LDL.  Total cholesterol around 200.  He has declined evaluation testing such as CT calcium score.

## 2023-02-27 NOTE — PROGRESS NOTES
Subjective:   Jaskaran Le is a 77 y.o. male here today for left foot pain, eczema and emphysema.    Left foot pain  This is a pleasant 77-year-old male here today to follow-up on his health.  Recently had more ulcerations on his left foot.  He had them previously on both of his feet.  Was using a cream that I prescribed in the past and it cleared up.    Flexural eczema  Diagnosed in the past years ago with eczema.  Uses a steroid cream mixed with over-the-counter creams which is effective.  Currently has exacerbation of the rash on his lower left leg.    Acute pain of left shoulder  Recently developed irritation of his left shoulder.  Has difficulty with range of motion.  Overall though is expecting the shoulder to improve in time.    Centrilobular emphysema (HCC)  Chronic condition.  Continues to smoke about a half a pack a day.  Requesting a refill today of Advair.  Medication is helpful.  Uses his rescue inhaler less.    Elevated cholesterol  Cholesterols been elevated in the past.  Above 100 with his LDL.  Total cholesterol around 200.  He has declined evaluation testing such as CT calcium score.    Left adrenal mass (HCC)  Chronic condition.  Evaluated routinely yearly with imaging.  Stable adrenal mass.       Current medicines (including changes today)  Current Outpatient Medications   Medication Sig Dispense Refill    fluticasone-salmeterol (ADVAIR) 100-50 MCG/ACT AEROSOL POWDER, BREATH ACTIVATED Inhale 1 Puff every 12 hours. 180 Each 3    betamethasone valerate (VALISONE) 0.1 % Cream Apply 1 Application topically 2 times a day. 45 g 3    albuterol 108 (90 Base) MCG/ACT Aero Soln inhalation aerosol Inhale 2 Puffs every 6 hours as needed for Shortness of Breath. 8 g 11     No current facility-administered medications for this visit.     He  has a past medical history of Chronic obstructive pulmonary disease (HCC).    Social History and Family History were reviewed and updated.    ROS   No chest  "pain, no shortness of breath, no abdominal pain and all other systems were reviewed and are negative.       Objective:     /60 (BP Location: Left arm, Patient Position: Sitting, BP Cuff Size: Adult)   Pulse 66   Temp 36.3 °C (97.4 °F) (Temporal)   Ht 1.613 m (5' 3.5\")   Wt 69.8 kg (153 lb 14.1 oz)   SpO2 95%  Body mass index is 26.83 kg/m².   Physical Exam:  Constitutional: Alert, no distress.  Skin: Warm, dry, good turgor, no rashes in visible areas.  Eye: Equal, round and reactive, conjunctiva clear, lids normal.  ENMT: Lips without lesions, good dentition, oropharynx clear.  Neck: Trachea midline, no masses.   Lymph: No cervical or supraclavicular lymphadenopathy  Respiratory: Unlabored respiratory effort, lungs appear clear.  Psych: Alert and oriented x3, normal affect and mood.        Assessment and Plan:   The following treatment plan was discussed    1. Left foot pain  Chronic, intermittent condition.  Resolved.  Continue to use cream as directed.    2. Flexural eczema  Chronic condition.  Stable.  Continue steroid cream as directed.  Renewed.  - betamethasone valerate (VALISONE) 0.1 % Cream; Apply 1 Application topically 2 times a day.  Dispense: 45 g; Refill: 3    3. Centrilobular emphysema (HCC)  Chronic condition.  Controlled.  Continues to smoke.  Renewed Advair as directed.  - fluticasone-salmeterol (ADVAIR) 100-50 MCG/ACT AEROSOL POWDER, BREATH ACTIVATED; Inhale 1 Puff every 12 hours.  Dispense: 180 Each; Refill: 3    4. Elevated cholesterol  Condition.  We will continue to monitor.  Discussed CT calcium score but he declined.    5. Atherosclerosis of aorta (HCC)  Chronic condition.  Stable.  We will continue to monitor.    6. Left adrenal mass (HCC)  Chronic condition.  Stable per annual imaging.    7. Acute pain of left shoulder  New condition noted in chart.  We will continue to monitor.  Offered referral to PT he declined.    8. Screening for colorectal cancer  Ordered Cologuard.  " Preferred Cologuard versus colonoscopy.  Discussed testing.  - Cologuard      Followup: Return in about 6 months (around 8/27/2023), or if symptoms worsen or fail to improve.    Please note that this dictation was created using voice recognition software. I have made every reasonable attempt to correct obvious errors, but I expect that there are errors of grammar and possibly content that I did not discover before finalizing the note.

## 2023-02-27 NOTE — ASSESSMENT & PLAN NOTE
Recently developed irritation of his left shoulder.  Has difficulty with range of motion.  Overall though is expecting the shoulder to improve in time.

## 2023-03-05 NOTE — PROGRESS NOTES
"Subjective:   Jaskaran Le is a 77 y.o. male who presents for Eye Drainage ((R) x yesterday with redness and goopy eye. )      77-year-old male notes a 4-day history of right eye irritation, had increased tear production.  No injury or trauma, does not wear contact lenses or glasses however woke up this morning with more discharge and matting.  Concern of possible pinkeye.  Has been using antihistamine drops with no improvement.  He denies changes to visual acuities    Review of Systems   Constitutional:  Negative for chills and fever.   HENT:  Negative for congestion, ear pain and sore throat.    Eyes:  Positive for discharge and redness. Negative for blurred vision, double vision, photophobia and pain.   Respiratory:  Negative for cough and shortness of breath.    Cardiovascular:  Negative for chest pain.   Gastrointestinal:  Negative for abdominal pain, constipation, diarrhea, nausea and vomiting.   Genitourinary:  Negative for dysuria.   Musculoskeletal:  Negative for myalgias.   Skin:  Negative for rash.   Neurological:  Negative for headaches.     Medications, Allergies, and current problem list reviewed today in Epic.     Objective:     BP (!) 146/92   Pulse 78   Temp 37.1 °C (98.7 °F) (Temporal)   Resp 18   Ht 1.676 m (5' 6\")   Wt 72.3 kg (159 lb 8 oz)   SpO2 100%     Physical Exam  Vitals reviewed.   Constitutional:       Appearance: Normal appearance.   HENT:      Head: Normocephalic and atraumatic.      Right Ear: External ear normal.      Left Ear: External ear normal.      Nose: Nose normal.      Mouth/Throat:      Mouth: Mucous membranes are moist.   Eyes:      Extraocular Movements: Extraocular movements intact.      Pupils: Pupils are equal, round, and reactive to light.      Comments: Right greater than left injected conjunctiva with obvious discharge on right eyelid margin.  PERRLA, EOMI, gaze aligns normally, vision grossly intact   Cardiovascular:      Rate and Rhythm: Normal " rate.   Pulmonary:      Effort: Pulmonary effort is normal.   Skin:     General: Skin is warm and dry.      Capillary Refill: Capillary refill takes less than 2 seconds.   Neurological:      Mental Status: He is alert and oriented to person, place, and time.       Assessment/Plan:     Diagnosis and associated orders:     1. Acute bacterial conjunctivitis of both eyes  polymixin-trimethoprim (POLYTRIM) 79328-4.1 UNIT/ML-% Solution         Comments/MDM:     No red flags of an acute red eye and intraocular pathology.  Reviewed OTC eyedrops as I was concerned he may be using Visine or an astringent eyedrop which would likely exacerbate things.  Follow-up as needed, reviewed red flags that would warrant prompt ER or ophthalmology evaluation         Differential diagnosis, natural history, supportive care, and indications for immediate follow-up discussed.    Advised the patient to follow-up with the primary care physician for recheck, reevaluation, and consideration of further management.    Please note that this dictation was created using voice recognition software. I have made a reasonable attempt to correct obvious errors, but I expect that there are errors of grammar and possibly content that I did not discover before finalizing the note.    This note was electronically signed by Parminder Lundy PA-C

## 2023-03-10 NOTE — PROGRESS NOTES
Subjective:     Jaskaran Le is a 77 y.o. male who presents for Weakness (X 11 days ) and Cough      Cough  This is a new problem. The current episode started in the past 7 days (Acute). The problem has been gradually worsening. The cough is Productive of purulent sputum. Associated symptoms include chills, a fever, myalgias, rhinorrhea and shortness of breath. Pertinent negatives include no chest pain, headaches or nasal congestion. Associated symptoms comments: Felt feverish. Risk factors for lung disease include smoking/tobacco exposure (Current smoker). He has tried rest and steroid inhaler for the symptoms. His past medical history is significant for bronchitis and COPD. There is no history of pneumonia.  Additionally, patient was seen in the urgent care last week for a bilateral bacterial conjunctivitis.  He has been using prescribed Vigamox daily as directed.  Patient endorses that the symptoms have not subsided since he started this medication.  He reports that he has had no change in his vision or any pain associated with this conjunctivitis.    Review of Systems   Constitutional:  Positive for chills and fever.   HENT:  Positive for rhinorrhea and sinus pain.    Respiratory:  Positive for cough and shortness of breath.    Cardiovascular:  Negative for chest pain.   Gastrointestinal:  Negative for diarrhea, nausea and vomiting.   Genitourinary: Negative.    Musculoskeletal:  Positive for myalgias.   Neurological:  Positive for weakness. Negative for headaches.   Psychiatric/Behavioral: Negative.       PMH:   Past Medical History:   Diagnosis Date    Chronic obstructive pulmonary disease (HCC)      ALLERGIES: No Known Allergies  SURGHX: No past surgical history on file.  SOCHX:   Social History     Socioeconomic History    Marital status:    Tobacco Use    Smoking status: Every Day     Packs/day: 0.50     Years: 60.00     Pack years: 30.00     Types: Cigarettes     Start date: 1959     "Smokeless tobacco: Never    Tobacco comments:     since age 14.   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   Constitutional:       General: He is not in acute distress.     Appearance: Normal appearance. He is not ill-appearing, toxic-appearing or diaphoretic.   HENT:      Head: Normocephalic and atraumatic.      Right Ear: Tympanic membrane and external ear normal.      Left Ear: Tympanic membrane and external ear normal.      Nose: No rhinorrhea.      Mouth/Throat:      Mouth: Mucous membranes are moist.   Eyes:      General:         Right eye: Discharge present.         Left eye: Discharge present.     Extraocular Movements: Extraocular movements intact.      Conjunctiva/sclera:      Right eye: Right conjunctiva is injected. Exudate present.      Left eye: Left conjunctiva is injected. Exudate present.      Pupils: Pupils are equal, round, and reactive to light.      Comments: Scant mucopurulent discharge present.   Cardiovascular:      Rate and Rhythm: Normal rate and regular rhythm.      Heart sounds: Normal heart sounds.   Pulmonary:      Effort: Pulmonary effort is normal. Prolonged expiration present. No tachypnea, accessory " muscle usage, respiratory distress or retractions.      Breath sounds: Normal air entry. No stridor, decreased air movement or transmitted upper airway sounds. Wheezing and rhonchi present.   Abdominal:      General: Abdomen is flat. There is no distension.   Musculoskeletal:         General: No swelling or tenderness.      Cervical back: Normal range of motion and neck supple.   Skin:     General: Skin is warm and dry.      Findings: No rash.   Neurological:      General: No focal deficit present.      Mental Status: He is alert and oriented to person, place, and time. Mental status is at baseline.   Psychiatric:         Mood and Affect: Mood normal.         Behavior: Behavior normal.         Thought Content: Thought content normal.         Judgment: Judgment normal.     Assessment/Plan:   Assessment      1. COPD with acute exacerbation (HCC)  - ipratropium-albuterol (DUONEB) nebulizer solution  - predniSONE (DELTASONE) 10 MG Tab; Take 4 Tablets by mouth 2 times a day for 5 days.  Dispense: 40 Tablet; Refill: 0  - azithromycin (ZITHROMAX) 250 MG Tab; Take 2 tablets by mouth on day one. Take one tablet by mouth the remaining days until gone  Dispense: 6 Tablet; Refill: 0    Wheezing improved after DuoNeb treatment.  Discussed management of acute COPD as well as supportive care, including increased fluids, deep breathing exercises, mobilization, smoking cessation.    2. Other mucopurulent conjunctivitis of both eyes  Patient referred to ophthalmology.  He was given the phone number of 3 different ophthalmologist in [place].  He was instructed to call and make an appointment for today to have his eyes assessed.  Per patient report, he said that he has been using Vigamox daily to bilateral eyes as directed.  He does not feel that this is improving.  He denies visual changes.  Instructed patient to use warm compresses to decrease inflammation.    Prescription called to patient's preferred pharmacy for azithromycin and  short burst of prednisone to help with COPD exacerbation.  Patient reports that he will continue to use his Advair inhaler daily.  Strict ER precautions discussed. Patient encouraged to seek treatment back in the ER or urgent care for worsening symptoms, fever greater than 100.5, wheezes or shortness of breath. Encouraged to follow up with PCP if symptoms fail to improve or worsen despite treatment and supportive care.     AVS handout given and reviewed with patient. Pt educated on red flags and when to seek treatment back in ER or UC.     I personally reviewed prior external notes and test results pertinent to today's visit.  I have independently reviewed and interpreted all diagnostics ordered during this urgent care visit.     Please note that this dictation was created using voice recognition software. I have made every reasonable attempt to correct obvious errors, but I expect that there may be errors of grammar and possibly content that I did not discover before finalizing the note.     This note was electronically signed by ROXANE Munroe

## 2023-05-08 NOTE — PROCEDURES
Technician: Sherly Henderson RRT, CPFT  Good patient effort & cooperation.  The results of this test meet the ATS/ERS standards for acceptability & reproducibility.  Test was performed on the [facility] Body Plethysmograph-Elite DX system.  Predicted equations for Spirometry are GLI-2012, ITS for lung volumes, and GLI-2017 for DLCO.  The DLCO was uncorrected for Hgb.  A bronchodilator of Ventolin HFA -2puffs via spacer administered.  DLCO performed during dilation period.    Interpretation;    Baseline spirometry shows airflow obstruction with FEV1/FVC ratio 40 and an FEV1 of 1 L or 43% predicted.  There is bronchodilator response with improvement in FEV1 to 1.3 L or 52% predicted.  Total lung capacity is normal at 6.6 L or 113% predicted.  There is air trapping with residual volume of 176% predicted.  Diffusion capacity is reduced at 62% predicted.  Pulmonary function testing shows airflow obstruction with air trapping and reduced DLCO consistent with chronic obstructive pulmonary disease.  There is bronchodilator responsiveness.

## 2023-07-03 NOTE — PROGRESS NOTES
Chief Complaint   Patient presents with    Follow-Up     Last seen 11/7/22, PFT 5/8/23, CT 11/11/22         HPI: This patient is a 77 y.o. male followed in our clinic for COPD last seen by me on 11/7/22. PMHx   is notable only for adrenal mass which is being monitored observationally.  He also has cataracts/  He is an active tobacco smoker with >60 pk year hx, currently 1 ppd.  He has RLL partially solid GGO. CT chest from June of 2020 showed right lower lobe 12 mm GGO  This was actually a follow-up CT from abnormal imaging done in March of 2020 during which there were multiple abnormalities all of which had resolved other than the RLL opacity. F/U CT done in September 2021 showed increase in RLL GGO with solid component to max 15 mm in diam. I spoke with pt following the study and he declined PET or attempt to biopsy. He was willing to continue surveillance imaging and consider XRT if nodule continues to grow but declines invasive procedures such as biopsy or surgical resection. Repeat CT chest in November showed stable nodule estimated at 10 mm. PFT from 5/8 showed stable FEV1 of 1.3L or 52% pred post BD with + BD response, air trapping and reduced DLCO which is new from 6/2021. Functionally he is MMRC 1-2 and swims daily. He is on advair HFA and prn albuterol which he uses never. No exacerbations but he is experiencing increased chest congestion AM and PM.     Past Medical History:   Diagnosis Date    Chronic obstructive pulmonary disease (HCC)        Social History     Socioeconomic History    Marital status:      Spouse name: Not on file    Number of children: Not on file    Years of education: Not on file    Highest education level: Not on file   Occupational History    Not on file   Tobacco Use    Smoking status: Every Day     Packs/day: 0.50     Years: 60.00     Pack years: 30.00     Types: Cigarettes     Start date: 1959    Smokeless tobacco: Never    Tobacco comments:     since age 14.   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    Drug use: No    Sexual activity: Not Currently     Partners: Female     Comment: , 2 girls, 5 grands, 6 great-grands   Other Topics Concern    Not on file   Social History Narrative    Not on file     Social Determinants of Health     Financial Resource Strain: Not on file   Food Insecurity: Not on file   Transportation Needs: Not on file   Physical Activity: Not on file   Stress: Not on file   Social Connections: Not on file   Intimate Partner Violence: Not on file   Housing Stability: Not on file       Family History   Problem Relation Age of Onset    Dementia Mother     Lung Disease Father     Cancer Father         Lung CA    Dementia Sister     Heart Disease Brother     Hypertension Brother     Dementia Sister         Alzheimer's    Diabetes Neg Hx        Current Outpatient Medications on File Prior to Visit   Medication Sig Dispense Refill    fluticasone-salmeterol (ADVAIR) 100-50 MCG/ACT AEROSOL POWDER, BREATH ACTIVATED Inhale 1 Puff every 12 hours. 180 Each 3    betamethasone valerate (VALISONE) 0.1 % Cream Apply 1 Application topically 2 times a day. 45 g 3    albuterol 108 (90 Base) MCG/ACT Aero Soln inhalation aerosol Inhale 2 Puffs every 6 hours as needed for Shortness of Breath. 8 g 11    azithromycin (ZITHROMAX) 250 MG Tab Take 2 tablets by mouth on day one. Take one tablet by mouth the remaining days until gone 6 Tablet 0     No current facility-administered medications on file prior to visit.       Patient has no known allergies.      ROS:   Review of Systems   Constitutional:  Negative for chills, diaphoresis, fever, malaise/fatigue and weight loss.   HENT:  Negative for congestion, ear discharge, ear pain, hearing loss, nosebleeds, sinus pain, sore throat and tinnitus.   "  Eyes:  Negative for blurred vision, double vision, photophobia, pain, discharge and redness.   Respiratory:  Positive for cough and shortness of breath. Negative for hemoptysis, sputum production, wheezing and stridor.    Cardiovascular:  Negative for chest pain, palpitations, orthopnea, claudication, leg swelling and PND.   Gastrointestinal:  Negative for abdominal pain, constipation, diarrhea, heartburn, nausea and vomiting.   Genitourinary:  Negative for dysuria and urgency.   Musculoskeletal:  Negative for back pain, falls, joint pain, myalgias and neck pain.   Skin:  Negative for itching and rash.   Neurological:  Negative for dizziness, tremors, speech change, focal weakness, weakness and headaches.   Endo/Heme/Allergies:  Negative for environmental allergies.   Psychiatric/Behavioral:  Negative for depression.        BP 90/58 (BP Location: Right arm, Patient Position: Sitting, BP Cuff Size: Adult)   Pulse 78   Ht 1.676 m (5' 6\")   Wt 70.3 kg (155 lb)   SpO2 90%   Physical Exam  Vitals reviewed.   Constitutional:       General: He is not in acute distress.     Appearance: Normal appearance. He is normal weight.   HENT:      Head: Normocephalic and atraumatic.      Right Ear: External ear normal.      Left Ear: External ear normal.      Nose: Nose normal. No congestion.      Mouth/Throat:      Mouth: Mucous membranes are moist.      Pharynx: Oropharynx is clear. No oropharyngeal exudate.   Eyes:      General: No scleral icterus.     Extraocular Movements: Extraocular movements intact.      Conjunctiva/sclera: Conjunctivae normal.      Pupils: Pupils are equal, round, and reactive to light.   Cardiovascular:      Rate and Rhythm: Normal rate and regular rhythm.      Heart sounds: Normal heart sounds. No murmur heard.     No gallop.   Pulmonary:      Effort: Pulmonary effort is normal. No respiratory distress.      Breath sounds: No wheezing or rales.      Comments: Prolonged expiratory phase  Abdominal:    "   General: There is no distension.      Palpations: Abdomen is soft.   Musculoskeletal:         General: Normal range of motion.      Cervical back: Normal range of motion and neck supple.      Right lower leg: No edema.      Left lower leg: No edema.   Skin:     General: Skin is warm and dry.      Findings: No rash.   Neurological:      Mental Status: He is alert and oriented to person, place, and time.      Cranial Nerves: No cranial nerve deficit.   Psychiatric:         Mood and Affect: Mood normal.         Behavior: Behavior normal.         PFTs as reviewed by me personally: as per hPI    Imaging as reviewed by me personally:  as per hPI    Assessment:  1. Chronic obstructive pulmonary disease, unspecified COPD type (HCC)  fluticasone-umeclidin-vilant (TRELEGY ELLIPTA) 100-62.5-25 MCG/ACT AEROSOL POWDER, BREATH ACTIVATED inhalation    DISCONTINUED: fluticasone-umeclidin-vilant (TRELEGY ELLIPTA) 100-62.5-25 MCG/ACT AEROSOL POWDER, BREATH ACTIVATED inhalation      2. Lung nodule  CT-CHEST (THORAX) W/O      3. Tobacco use            Plan:  Chronic, moderate to severe and progressive. Trial of trelegy; may need O2 in near future which we discussed.  Counseled on tobacco cessation.  Stable.  Repeat CT in November.  Counseled on tobacco cessation.  Return in about 4 months (around 11/3/2023) for ct chest.

## 2023-08-28 PROBLEM — M25.562 CHRONIC PAIN OF BOTH KNEES: Status: ACTIVE | Noted: 2023-01-01

## 2023-08-28 PROBLEM — M79.672 LEFT FOOT PAIN: Status: RESOLVED | Noted: 2021-10-25 | Resolved: 2023-01-01

## 2023-08-28 PROBLEM — M25.512 ACUTE PAIN OF LEFT SHOULDER: Status: RESOLVED | Noted: 2023-01-01 | Resolved: 2023-01-01

## 2023-08-28 PROBLEM — G89.29 CHRONIC PAIN OF BOTH KNEES: Status: ACTIVE | Noted: 2023-01-01

## 2023-08-28 PROBLEM — M25.561 CHRONIC PAIN OF BOTH KNEES: Status: ACTIVE | Noted: 2023-01-01

## 2023-08-28 NOTE — PROGRESS NOTES
Subjective:   Jaskaran Le is a 77 y.o. male here today for emphysema and hypercholesterolemia.    Centrilobular emphysema (HCC)  This is a pleasant 77-year-old male here today to follow-up on his health.  History of COPD.  Still a smoker.  He does follow with pulmonology.  Currently alternating between Trelegy and Advair.  With Advair he is able to cough up mucus and with Trelegy he may cough but there is no coughing up mucus.  He may ultimately choose Advair because of its cost.  He has a follow-up appointment for a CT scan next several weeks.    Chronic pain of both knees  Complains of chronic stiffness bilateral knees.  Does exercise routinely.  Knee pain is not too severe.    Elevated cholesterol  Noted to have atherosclerosis on imaging.  The past has declined cholesterol medication.       Current medicines (including changes today)  Current Outpatient Medications   Medication Sig Dispense Refill    fluticasone-umeclidin-vilant (TRELEGY ELLIPTA) 100-62.5-25 MCG/ACT AEROSOL POWDER, BREATH ACTIVATED inhalation Inhale 1 Inhalation every day. 1 Each 6    fluticasone-salmeterol (ADVAIR) 100-50 MCG/ACT AEROSOL POWDER, BREATH ACTIVATED Inhale 1 Puff every 12 hours. 180 Each 3    betamethasone valerate (VALISONE) 0.1 % Cream Apply 1 Application topically 2 times a day. 45 g 3    albuterol 108 (90 Base) MCG/ACT Aero Soln inhalation aerosol Inhale 2 Puffs every 6 hours as needed for Shortness of Breath. 8 g 11     No current facility-administered medications for this visit.     He  has a past medical history of Chronic obstructive pulmonary disease (HCC).    Social History and Family History were reviewed and updated.    ROS   No chest pain, no shortness of breath, no abdominal pain and all other systems were reviewed and are negative.       Objective:     /60 (BP Location: Right arm, Patient Position: Sitting, BP Cuff Size: Adult)   Pulse 75   Temp 35.8 °C (96.5 °F) (Temporal)   Ht 1.613 m (5'  "3.5\")   Wt 70.9 kg (156 lb 6.4 oz)   SpO2 100%  Body mass index is 27.27 kg/m².   Physical Exam:  Constitutional: Alert, no distress.  Skin: Warm, dry, good turgor, no rashes in visible areas.  Eye: Equal, round and reactive, conjunctiva clear, lids normal.  ENMT: Lips without lesions, good dentition, oropharynx clear.  Neck: Trachea midline, no masses.   Respiratory: Unlabored respiratory effort.  Psych: Alert and oriented x3, normal affect and mood.        Assessment and Plan:   The following treatment plan was discussed    1. Centrilobular emphysema (HCC)  Chronic condition.  Stable.  Continue to follow with Dr. Silva.  Follow-up with CT scan.  Reviewed medications.    2. Elevated cholesterol  Chronic condition.  Uncontrolled.  Discussed need for cholesterol medication but he declined today.  Ordered labs.  Fast 8 hours.  - CBC WITH DIFFERENTIAL; Future  - Comp Metabolic Panel; Future  - Lipid Profile; Future    3. Chronic pain of both knees  New condition noted in chart but chronic.  We will continue to monitor.  Discussed likely with OA.  Declined imaging.    4. Screening PSA (prostate specific antigen)  PSA ordered.  Screening.  Perform after the third of next month.  Asked given other labs.  - PROSTATE SPECIFIC AG SCREENING; Future     Discussed need for hepatitis A vaccination.  Follow-up at [facility] or at [facility] pharmacy for the vaccine.  2 vaccine series.    Followup: Return in about 6 months (around 2/28/2024), or if symptoms worsen or fail to improve.    Please note that this dictation was created using voice recognition software. I have made every reasonable attempt to correct obvious errors, but I expect that there are errors of grammar and possibly content that I did not discover before finalizing the note.             "

## 2023-08-28 NOTE — ASSESSMENT & PLAN NOTE
Complains of chronic stiffness bilateral knees.  Does exercise routinely.  Knee pain is not too severe.

## 2023-08-28 NOTE — ASSESSMENT & PLAN NOTE
This is a pleasant 77-year-old male here today to follow-up on his health.  History of COPD.  Still a smoker.  He does follow with pulmonology.  Currently alternating between Trelegy and Advair.  With Advair he is able to cough up mucus and with Trelegy he may cough but there is no coughing up mucus.  He may ultimately choose Advair because of its cost.  He has a follow-up appointment for a CT scan next several weeks.

## 2023-09-08 NOTE — TELEPHONE ENCOUNTER
Have we ever prescribed this med? Yes.     Last OV: 7/3/23      Medications: albuterol 108 (90 Base) MCG/ACT Aero Soln inhalation aerosol

## 2023-09-08 NOTE — TELEPHONE ENCOUNTER
Phone Number Called: [phone]    Call outcome: Did not leave a detailed message. Requested patient to call back.    Message: 1/3 phone call attempts to contact pt in regards of lab results. Left pt a barbara.

## 2023-09-08 NOTE — TELEPHONE ENCOUNTER
Phone Number Called: [phone]    Call outcome: Spoke to patient regarding message below.    Message: Pt was contacted in regards of lab results. Pt had no questions or concerns.

## 2023-09-08 NOTE — TELEPHONE ENCOUNTER
----- Message from Nate Burgess P.A.-C. sent at 9/8/2023  8:34 AM PDT -----  Please contact Jaskaran.  Labs generally look good but there is an elevation in CBC numbers.  I would like him to repeat the CBC in 2 to 3 weeks.  I will reorder it.  Nonfasting.  Thank you.    Nate

## 2023-12-01 NOTE — TELEPHONE ENCOUNTER
Discussed CT results with patient.  He now has right lower lobe mass up to 10 cm in size that involves anterior portion of the hilum.  He also has mediastinal lymphadenopathy.  At this point I am recommending bronchoscopy with endobronchial ultrasound for lymph node biopsy and biopsy of right lower lobe mass.  Patient is agreeable to this.  I will also place referral to oncology.

## 2023-12-08 NOTE — PREPROCEDURE INSTRUCTIONS
Pre-admit telephone appointment completed. Reviewed the Preparing for your procedure handout with patient over the phone. Patient instructed per pharmacy guidelines regarding taking, holding, or contacting provider for instructions on regularly prescribed medications before surgery. Instructed to take the following medications the day of surgery with a sip of water per pharmacy medication guidelines: Advair or Trelegy Ellipta.    Denies anesthesia complications

## 2023-12-14 PROBLEM — J44.9 COPD (CHRONIC OBSTRUCTIVE PULMONARY DISEASE) (HCC): Status: ACTIVE | Noted: 2023-01-01

## 2023-12-14 PROBLEM — F10.11 HISTORY OF ALCOHOL ABUSE: Status: ACTIVE | Noted: 2023-01-01

## 2023-12-14 PROBLEM — J96.01 ACUTE RESPIRATORY FAILURE WITH HYPOXEMIA (HCC): Status: ACTIVE | Noted: 2023-01-01

## 2023-12-14 PROBLEM — R60.0 BILATERAL LOWER EXTREMITY EDEMA: Status: ACTIVE | Noted: 2023-01-01

## 2023-12-14 NOTE — OR NURSING
Pt allergies and NPO status verified. Home medications reconciled, preferred pharmacy verified. Belongings secured in locker. Pt verbalizes understanding of pain scale, expected course of stay, and plan of care. Surgical site verified with pt. IV access established. All questions answered. Bed in lowest position, call light within reach.   MD Johnson updated on pre-admit labs, order placed for repeat labs. MD Johnson and MD Gastelum updated that initial SpO2 77-83% and that patient placed on 2L O2 in pre-op.    Tammy Baez 48 y o  female MRN: [id number]    Encounter: [id number]      Assessment/Plan     Problem List Items Addressed This Visit        Endocrine    Other specified hypothyroidism - Primary     Will update thyroid function tests now in light of present symptoms           Hyperparathyroidism ([address] )     PTH mildly elevated in light of normal serum calcium & low phosphorus & wnl Vit D  Encourage 1200 mg daily calcium intake & continuing Vit D  Will follow up repeat PTH levels  Musculoskeletal and Integument    Osteoporosis     Patient with multiple year history of reclast  Was briefly on denosumab x1 year before it was abruptly stopped  CTX obtained recently in the lower half of normal range, perhaps suggesting some ongoing effects of reclast, however a single value interpreted in isolation is challenging  Last DXA showed osteopenia  Will monitor clinically  Next DXA Oct 2023              Other    Polyarthralgia     Follow up ESR           Relevant Orders    Sedimentation rate, automated          CC: Hypothyroidism, myalgias/arthralgias    History of Present Illness     HPI:    Zehra Clifford presents today for follow up evaluation  She reports recent episodes of myalgias, polyarthalgias & abdominal rash (now resolved) over the course of the last few weeks  The rash did not have a bullseye appearance  She denies any fevers  She has not any swelling of joints  She has been taking NSAIDS  She had testing for Lyme's, but that was apparently unrevealing  For hypothyroidism, she takes levothyroxine 100 mcg daily  She has been taking this dose since at last December  For bone health, she takes vit D and calcium daily  Review of Systems   Constitutional: Positive for fatigue  Negative for diaphoresis and unexpected weight change  HENT: Negative for trouble swallowing and voice change  Eyes: Negative for visual disturbance  Respiratory: Negative for shortness of breath      Cardiovascular: Negative for chest pain, palpitations and leg swelling  Gastrointestinal: Negative for abdominal pain, nausea and vomiting  Endocrine: Negative for heat intolerance  Musculoskeletal: Positive for arthralgias and myalgias  Skin: Negative for rash and wound  Neurological: Positive for weakness  Negative for tremors  Psychiatric/Behavioral: Negative for agitation and behavioral problems  Historical Information   Past Medical History:   Diagnosis Date    Allergic 1974    Seasonal    Anxiety     Arthritis     Colitis     Noted on colonoscopy 04/24/2020      Depression     Disease of thyroid gland     Gastritis     Noted on EGD 04/24/2020    Gastroparesis     Glaucoma     Headache(784 0)     Hyperlipidemia     Hypertension     Hypothyroidism     Lyme disease     Migraine     Osteoporosis     Scoliosis     SVT (supraventricular tachycardia) (HCC)     Visual impairment     VT (ventricular tachycardia) (HCC)      Past Surgical History:   Procedure Laterality Date    BREAST BIOPSY Right 10/21/2020    papilloma    COLONOSCOPY      EGD      FINGER SURGERY      left pinky    FL GUIDED NEEDLE PLAC BX/ASP/INJ  3/17/2022    HYSTERECTOMY      age- 47    HYSTERECTOMY W/ SALPINGO-OOPHERECTOMY  05/2008    NERVE BLOCK Bilateral 3/17/2022    Procedure: L3, L4, L5 BLOCK MEDIAL BRANCH;  Surgeon: José Luis Le MD;  Location:  [facility];  Service: Pain Management     OOPHORECTOMY Bilateral     age- 47    US GUIDED BREAST BIOPSY RIGHT COMPLETE Right 10/21/2020     Social History   Social History     Substance and Sexual Activity   Alcohol Use Never     Social History     Substance and Sexual Activity   Drug Use Never     Social History     Tobacco Use   Smoking Status Former Smoker    Packs/day: 1 00    Years: 10 00    Pack years: 10 00    Types: Cigarettes    Quit date: 1/1/2007    Years since quitting: 15 4   Smokeless Tobacco Never Used   Tobacco Comment    quit 2007     Family History:   Family History   Problem Relation Age of Onset    Peripheral vascular disease Mother     Glaucoma Mother     Prostate cancer Father     Hypothyroidism Sister     No Known Problems Daughter     Colon cancer Maternal Grandmother     No Known Problems Maternal Grandfather     Arthritis Paternal Grandmother     No Known Problems Paternal Grandfather     No Known Problems Daughter     No Known Problems Maternal Aunt     No Known Problems Maternal Aunt     Skin cancer Paternal Aunt     No Known Problems Paternal Aunt        Meds/Allergies   Current Outpatient Medications   Medication Sig Dispense Refill    atorvastatin (LIPITOR) 40 mg tablet Take 1 tablet (40 mg total) by mouth daily 90 tablet 1    Calcium Carbonate-Vit D-Min (CALCIUM 1200 PO) Take by mouth daily       Cholecalciferol (VITAMIN D3 PO) Take 1,000 mg by mouth      Glucosamine-Chondroitin 500-400 MG CAPS Take by mouth daily       hydrOXYzine HCL (ATARAX) 25 mg tablet Take 1 tablet (25 mg total) by mouth as needed for anxiety 30 tablet 1    irbesartan (AVAPRO) 75 mg tablet Take 1 tablet (75 mg total) by mouth daily 30 tablet 11    levothyroxine 100 mcg tablet Take 1 tablet (100 mcg total) by mouth daily 90 tablet 1    Magnesium 250 MG TABS Take 1 tablet (250 mg total) by mouth daily 30 tablet 0    metoprolol succinate (TOPROL-XL) 25 mg 24 hr tablet Take 1 tablet (25 mg total) by mouth daily 90 tablet 4    multivitamin (THERAGRAN) TABS Take 1 tablet by mouth daily      Omega-3 Fatty Acids (FISH OIL OMEGA-3 PO) Take by mouth      omeprazole (PriLOSEC) 40 MG capsule       ALPRAZolam (XANAX) 0 5 mg tablet Take 1 tablet (0 5 mg total) by mouth 30 min pre-procedure (Patient not taking: Reported on 5/24/2022) 4 tablet 0    b complex vitamins capsule Take 1 capsule by mouth daily (Patient not taking: Reported on 5/24/2022)      bimatoprost (Lumigan) 0 01 % ophthalmic drops Lumigan 0 01 % eye drops (Patient not taking: Reported on 5/24/2022)      gabapentin (NEURONTIN) 100 mg capsule Take 1 capsule (100 mg total) by mouth 3 (three) times a day (Patient not taking: Reported on 5/24/2022) 90 capsule 0    Inulin (FIBER CHOICE PO) Take by mouth (Patient not taking: Reported on 5/24/2022)      LORazepam (ATIVAN) 0 5 mg tablet Take 0 5 mg by mouth as needed (Patient not taking: Reported on 5/24/2022)      Methyl Salicylate-Lido-Menthol (LidoPro) 4-4-5 % PTCH LidoPro 4 %-4 %-5 % topical patch   APPLY 1 PATCH TO THE AFFECTED AREA EVERY 8 TO 12 HOURS AS NEEDED  MAX OF 2 PATCHES PER DAY (Patient not taking: Reported on 5/24/2022)      SUMAtriptan (IMITREX) 100 mg tablet Take 100 mg by mouth as needed (Patient not taking: Reported on 5/24/2022)      Thiamine HCl (VITAMIN B1 PO) Take 1 capsule by mouth daily (Patient not taking: Reported on 5/24/2022)       No current facility-administered medications for this visit  No Known Allergies    Objective   Vitals: Blood pressure 118/72, pulse 78, height 5' 8" (1 727 m), weight 67 6 kg (149 lb)  Physical Exam  Vitals reviewed  Constitutional:       Appearance: She is not ill-appearing  HENT:      Head: Normocephalic and atraumatic  Nose: Nose normal    Eyes:      General: No scleral icterus  Conjunctiva/sclera: Conjunctivae normal    Neck:      Thyroid: No thyroid mass, thyromegaly or thyroid tenderness  Cardiovascular:      Rate and Rhythm: Normal rate and regular rhythm  Pulses: Normal pulses  Heart sounds: No murmur heard  Pulmonary:      Effort: Pulmonary effort is normal  No respiratory distress  Abdominal:      Palpations: Abdomen is soft  Tenderness: There is no abdominal tenderness  Musculoskeletal:      Cervical back: Neck supple  Lymphadenopathy:      Cervical: No cervical adenopathy  Skin:     General: Skin is warm and dry  Neurological:      Mental Status: She is alert     Psychiatric:         Mood and Affect: Mood normal          Behavior: Behavior normal  The history was obtained from the review of the chart, patient  Lab Results:   Lab Results   Component Value Date/Time    TSH 3RD GENERATON 2 215 02/28/2022 08:19 AM    TSH 3RD GENERATON 1 464 02/11/2022 08:12 AM    TSH 3RD GENERATON 10 325 (H) 12/02/2021 09:30 AM    Free T4 1 13 02/28/2022 08:19 AM    Free T4 1 13 02/11/2022 08:12 AM    Free T4 1 01 12/02/2021 09:30 AM     Component      Latest Ref Rng & Units 2/28/2022   Sodium      136 - 145 mmol/L 139   Potassium      3 5 - 5 3 mmol/L 3 6   Chloride      100 - 108 mmol/L 104   CO2      21 - 32 mmol/L 26   Anion Gap      4 - 13 mmol/L 9   BUN      5 - 25 mg/dL 10   Creatinine      0 60 - 1 30 mg/dL 0 78   GLUCOSE FASTING      65 - 99 mg/dL 101 (H)   Calcium      8 3 - 10 1 mg/dL 8 2 (L)   AST      5 - 45 U/L 17   ALT      12 - 78 U/L 26   Alkaline Phosphatase      46 - 116 U/L 59   Total Protein      6 4 - 8 2 g/dL 7 7   Albumin      3 5 - 5 0 g/dL 3 9   TOTAL BILIRUBIN      0 20 - 1 00 mg/dL 0 40   eGFR      ml/min/1 73sq m 87   Free T4      0 76 - 1 46 ng/dL 1 13   TSH 3RD GENERATON      0 358 - 3 740 uIU/mL 2 215   Vit D, 25-Hydroxy      30 0 - 100 0 ng/mL 37 9   PARATHYROID HORMONE      18 4 - 80 1 pg/mL 106 2 (H)   Phosphorus      2 7 - 4 5 mg/dL 2 4 (L)   C-TELOPEPTIDE,SERUM      pg/mL 153         Imaging Studies:      10/22/2021  CENTRAL DXA SCAN     CLINICAL HISTORY:  72-year-old postmenopausal  female with a family history of low impact parenteral hip fracture  Osteoporosis screening  OTHER RISK FACTORS:  None      PHARMACOLOGIC THERAPY FOR OSTEOPOROSIS:  Prolia      TECHNIQUE: Bone densitometry was performed using a Hologic Horizon A  bone densitometer    Regions of interest appear properly placed       COMPARISON: Outside studies performed on a dissimilar DEXA unit, most recent November 13, 2019            RESULTS:   LUMBAR SPINE L1-L4 :   BMD  0 881  gm/cm2   T-score -1 5    These values are artifactually elevated due to the presence of scoliosis with spondylosis      LEFT  TOTAL HIP:   BMD:  0 886  gm/cm2   T-score:  -0 5     LEFT  FEMORAL NECK:   BMD:  0 710  gm/cm2   T score: -1 2               IMPRESSION:  1  Low bone mass (OSTEOPENIA)  [Based on the lumbar spine]     2  Since the prior study, there has been a slight DECREASE in the total bone mineral densities of both the lumbar spine and left hip      It should be noted however that the examinations were performed on dissimilar DXA units  I have personally reviewed pertinent reports  Portions of the record may have been created with voice recognition software  Occasional wrong word or "sound a like" substitutions may have occurred due to the inherent limitations of voice recognition software  Read the chart carefully and recognize, using context, where substitutions have occurred

## 2023-12-14 NOTE — ANESTHESIA PREPROCEDURE EVALUATION
Case: [id number] Date/Time: 12/14/23 1515    Procedures:       FIBER OPTIC BRONCHOSCOPY WITH POSSIBLE WASH, BRUSH, BRONCHOALVEOLAR LAVAGE, BIOPSY, FINE NEEDLE ASPIRATION AND ENDOBRONCHIAL ULTRASOUND      ENDOBRONCHIAL ULTRASOUND (EBUS)    Pre-op diagnosis: RIGHT LOWER LOBE LUNG MASS    Location:  PROCEDURE ROOM / SURGERY [facility]    Surgeons: Daisy Gastelum M.D.            Relevant Problems   PULMONARY   (positive) Centrilobular emphysema (HCC)      CARDIAC   (positive) Atherosclerosis of aorta (HCC)       Physical Exam    Airway   Mallampati: II  TM distance: >3 FB       Cardiovascular   Rhythm: regular  Rate: normal     Dental - normal exam  (+) upper dentures, lower dentures           Pulmonary   (+) rhonchi, decreased breath sounds     Abdominal - normal exam     Neurological                Anesthesia Plan    ASA 3   ASA physical status 3 criteria: COPD - poorly controlled    Plan - general       Airway plan will be ETT          Induction: intravenous      Pertinent diagnostic labs and testing reviewed    Informed Consent:    Anesthetic plan and risks discussed with patient.    Use of blood products discussed with: whom consented to blood products.

## 2023-12-14 NOTE — ANESTHESIA PROCEDURE NOTES
Airway    Date/Time: 12/14/2023 3:50 PM    Performed by: Opal Johnson M.D.  Authorized by: Opal Johnson M.D.    Location:  OR  Urgency:  Elective  Difficult Airway: No    Indications for Airway Management:  Anesthesia      Spontaneous Ventilation: absent    Sedation Level:  Deep  Preoxygenated: Yes    Patient Position:  Sniffing  MILS Maintained Throughout: No    Mask Difficulty Assessment:  1 - vent by mask  Final Airway Type:  Endotracheal airway  Final Endotracheal Airway:  ETT  Cuffed: Yes    Technique Used for Successful ETT Placement:  Direct laryngoscopy  Devices/Methods Used in Placement:  Intubating stylet    Insertion Site:  Oral  Blade Type:  Mckeon  Laryngoscope Blade/Videolaryngoscope Blade Size:  2  ETT Size (mm):  8.0  Placement Verified by: capnometry    Cormack-Lehane Classification:  Grade I - full view of glottis  Number of Attempts at Approach:  1

## 2023-12-15 NOTE — ASSESSMENT & PLAN NOTE
Patient reports 30 years of alcohol abuse and he quit 22 years ago and he has been sober ever since.  Discussed with him in detail smoking cessation counseling at this point he is not interested as he says he has tried nicotine patches and Chantix in the past none of them worked.  He has been try to cut down slowly over time but this was not effective.  Smoking cessation discussion held for 13 minutes.

## 2023-12-15 NOTE — ASSESSMENT & PLAN NOTE
Patient has complaint of bilateral lower extremity edema as of late  Obtain an echocardiogram to make sure there is no underlying heart failure and also obtain a BNP level

## 2023-12-15 NOTE — CARE PLAN
The patient is Stable - Low risk of patient condition declining or worsening    Shift Goals  Clinical Goals: Walking O2, monitor O2 needs, fall prevention  Patient Goals: rest  Family Goals: SAL    Progress made toward(s) clinical / shift goals:      Problem: Knowledge Deficit - Standard  Goal: Patient and family/care givers will demonstrate understanding of plan of care, disease process/condition, diagnostic tests and medications  Description: Target End Date:  1-3 days or as soon as patient condition allows    Document in Patient Education    1.  Patient and family/caregiver oriented to unit, equipment, visitation policy and means for communicating concern  2.  Complete/review Learning Assessment  3.  Assess knowledge level of disease process/condition, treatment plan, diagnostic tests and medications  4.  Explain disease process/condition, treatment plan, diagnostic tests and medications  Outcome: Progressing  Note: Pt updated on POC to include monitoring o2 demands, walking O2, fall prevention, and breathing treatments.      Problem: Skin Integrity  Goal: Skin integrity is maintained or improved  Description: Target End Date:  Prior to discharge or change in level of care    Document interventions on Skin Risk/Maged flowsheet groups and corresponding LDA    1.  Assess and monitor skin integrity, appearance and/or temperature  2.  Assess risk factors for impaired skin integrity and/or pressures ulcers  3.  Implement precautions to protect skin integrity in collaboration with interdisciplinary team  4.  Implement pressure ulcer prevention protocol if at risk for skin breakdown  5.  Confirm wound care consult if at risk for skin breakdown  6.  Ensure patient use of pressure relieving devices  (Low air loss bed, waffle overlay, heel protectors, ROHO cushion, etc)  Outcome: Progressing     Problem: Discharge Barriers/Planning  Goal: Patient's continuum of care needs are met  Description: Target End Date:  Prior to  discharge or change in level of care    1.  Identify potential discharge barriers on admission and throughout hospitalization  2.  Collaborate with Case Management, , Clinical Educators, Navigators and others on the transitional care team to meet discharge needs  3.  Involve patient/family/caregivers in setting and prioritizing goals for hospitalization and discharge  4.  Ensure Flu vaccinations are addressed  5.  Inquire if patient is interested in the Meds to Bed program  6.  Ensure patient and family/caregiver are able to demonstrate use of equipment as prescribed  7.  Ensure patient and family/caregiver can verbalize understanding of patient education  8.  Explain discharge instructions and medication reconciliation to patient and family/caregiver  Outcome: Progressing     Problem: Hemodynamics  Goal: Patient's hemodynamics, fluid balance and neurologic status will be stable or improve  Description: Target End Date:  Prior to discharge or change in level of care    Document on Assessment and I/O flowsheet templates    1.  Monitor vital signs, pulse oximetry and cardiac monitor per provider order and/or policy  2.  Maintain blood pressure per provider order  3.  Hemodynamic monitoring per provider order  4.  Manage IV fluids and IV infusions  5.  Monitor intake and output  6.  Daily weights per unit policy or provider order  7.  Assess peripheral pulses and capillary refill  8.  Assess color and body temperature  9.  Position patient for maximum circulation/cardiac output  10. Monitor for signs/symptoms of excessive bleeding  11. Assess mental status, restlessness and changes in level of consciousness  12. Monitor temperature and report fever or hypothermia to provider immediately. Consideration of targeted temperature management.  Outcome: Progressing     Problem: Respiratory  Goal: Patient will achieve/maintain optimum respiratory ventilation and gas exchange  Description: Target End Date:  Prior  to discharge or change in level of care    Document on Assessment flowsheet    1.  Assess and monitor rate, rhythm, depth and effort of respiration  2.  Breath sounds assessed qshift and/or as needed  3.  Assess O2 saturation, administer/titrate oxygen as ordered  4.  Position patient for maximum ventilatory efficiency  5.  Turn, cough, and deep breath with splinting to improve effectiveness  6.  Collaborate with RT to administer medication/treatments per order  7.  Encourage use of incentive spirometer and encourage patient to cough after use and utilize splinting techniques if applicable  8.  Airway suctioning  9.  Monitor sputum production for changes in color, consistency and frequency  10. Perform frequent oral hygiene  11. Alternate physical activity with rest periods  Outcome: Progressing  Note: 93% on 5l NC. Walking O2 performed.        Patient is not progressing towards the following goals:

## 2023-12-15 NOTE — ANESTHESIA TIME REPORT
Anesthesia Start and Stop Event Times       Date Time Event    12/14/2023 1514 Ready for Procedure     1542 Anesthesia Start     1703 Anesthesia Stop          Responsible Staff  12/14/23      Name Role Begin End    Opal Johnson M.D. Anesth 1542 1703          Overtime Reason:  no overtime (within assigned shift)    Comments:

## 2023-12-15 NOTE — PROCEDURES
Bronchoscopy Procedure Note      Results/Findings: Malignancy    Specimen: Endobronchial biopsy, TBNA, BAL        Location: [facility] End[facility]    Date of Operation: 12/14/23    Attending Physician Performing Procedure: Daisy Gastelum M.D.     Pre-op Diagnosis: Right lung mass    Post-op Diagnosis: Right lung malignancy    Anesthesia: General, administered by Dr Johnson    Operation: Bronchoscopy with biopsy and EBUS      Estimated Blood Loss: ~20cc    Complications: None    Indications and History:    The patient is a 78 y.o. male. The risks, benefits, complications, treatment options and expected outcomes were discussed with the patient. The possibilities of reaction to medication, pulmonary aspiration, perforation of a viscus, bleeding, failure to diagnose a condition and creating a complication requiring transfusion or operation were discussed with the patient who freely signed the consent.    Description of Procedure:    The patient was seen in the Pre-op area and interval H&P was verified. The patient was taken to the endoscopy suite, identified as Jaskaran Le and a Time Out was held and the above information confirmed. After the induction of general anesthesia, the patient was positioned supine and the bronchoscope was passed through the ETT. The scope was then passed into the trachea. Careful inspection of the tracheal lumen was accomplished. The scope was sequentially passed into the left main and then left upper and lower bronchi and segmental bronchi. The scope was then withdrawn and advanced into the right main bronchus and then into the RUL, RML, and RLL bronchi and segmental bronchi.    Findings:    Trachea: normal bronchial mucosa  Linda: normal bronchial mucosa  Right main bronchus: normal bronchial mucosa  Right upper lobe bronchus: normal bronchial mucosa  Right middle lobe bronchus: Friable, edematous and irregular tissue, bronchus patent ~ 75%  Right lower lobe  bronchus: 100 obstructed by tumor invasion.  Left main bronchus: normal bronchial mucosa  Left upper lobe bronchus: normal bronchial mucosa  Left lower lobe bronchus: normal bronchial mucosa    Bronchoalveolar lavage was performed in the [facility]. Lavage specimen was bloody in gross appearance.    Endobronchial biopsy: Endobronchial biopsies were taken x ~ 10 with positive atypia on KEENA    EBUS  EBUS was performed using a Olympus needle size 21  Stations 4R, 4L, 7, 10R, 10L, 11R, 11L were surveyed.     The Following stations were evaluated with biopsy:    7: 3 Passes, 2 slides, 3 passes for cell block, lymphocytes on KEENA  4L: 3 Passes, 2 slides, 3 passes for cell block, lymphocytes on KEENA  11R: 3 Passes, 1 slides, 3 passes for cell block, lymphocytes on KEENA    The EBUS scope was removed and the balloon was noted to be intact and accounted for outside the patient.   At the close of the EBUS an airway survey was again performed and hemostasis was achieved prior to termination of the bronchoscopy.      The Patient was subsequently taken to the PACU in satisfactory condition.    Jaskaran will be notified of the results of the procedure.  He will be admitted to [facility] due to his severe hypoxia prior to the procedure and afterwards and lack of O2 at home.  I called his granddaughter Janeth but there was no answer.     Daisy Gastelum MD RD  Pulmonary and Critical Care    Available on [facility]

## 2023-12-15 NOTE — DISCHARGE INSTRUCTIONS
You were hospitalized for low oxygen levels and found to have a lung mass in lower portion of your right lung.  The biopsy taken by Dr. Gastelum is pending at time of discharge.  Please follow up with Dr. Silva as outpatient. We have also referred you to oncology to make sure you learn of all your treatment options if this is proven to be cancer.  Continue your inhalers and 4 more days of steroids.  You will require oxygen and the goal is to keep your oxygenation between 88-92%. We advise you purchase a pulse oximeter for home.

## 2023-12-15 NOTE — DISCHARGE PLANNING
"HTH/SCP TCN chart review completed. Collaborated with KATHY De Leon prior to meeting with the pt. The most current review of medical record, knowledge of pt's PLOF and social support, LACE+ score of 53, 6 clicks scores of 22 ADL and 19 mobility were considered. In chart review, noted order and face to face present for oxygen needs at discharge.     TCN met with patient at bedside. Introduced self and TCN program. Provided education regarding post acute levels of care. Discussed HTH/SCP plan benefits. Pt verbalized understanding.     Patient endorsed he resides alone in an apartment. Patient stated no use of assistive device with ambulation prior to acute hospitalization noting no mobility concerns at this time. Patient stated no previous need for oxygen at home. Patient stated independence in ADLs, IADLs and noted he uses public transportation for attendance to appointments. Previous to acute hospitalization, patient endorsed he \"walks a mile a day and swims 30 minutes\" at a local fitness center daily.     In collaboration with KATHY and based on patient potential discharge planning needs, choice obtained for DME ([employer]), faxed to [facility] and given to CM. Patient was amendable to TCN assistance in scheduling after hospital visit follow up with primary care physician, confirmed to Nate Burgess P.A.-C.     TCN will continue to follow and collaborate with discharge planning team should additional post acute needs arise. Thank you.     Completed today:  Choice obtained: DME ([employer])  SCP with [facility] PCP. TCN placed call to hospital scheduling to assist in scheduling after hospital visit follow up  "

## 2023-12-15 NOTE — FACE TO FACE
"Face to Face Note  -  Durable Medical Equipment    Jenny Wan M.D. - NPI: [id number]  I certify that this patient is under my care and that they had a durable medical equipment(DME)face to face encounter by myself that meets the physician DME face-to-face encounter requirements with this patient on:    Date of encounter:   Patient:                    MRN:                       YOB: 2021  Jaskaran Le  [id number]  1945     The encounter with the patient was in whole, or in part, for the following medical condition, which is the primary reason for durable medical equipment:  COPD    I certify that, based on my findings, the following durable medical equipment is medically necessary:    Oxygen   HOME O2 Saturation Measurements:(Values must be present for Home Oxygen orders)  Room air sat at rest: 85  Room air sat with amb: 95  With liters of O2: 5, O2 sat at rest with O2: 93  With Liters of O2: 5, O2 sat with amb with O2 : 97  Is the patient mobile?: Yes  If patient feels more short of breath, they can go up to 6 liters per minute and contact healthcare provider.    Supporting Symptoms: The patient requires supplemental oxygen, as the following interventions have been tried with limited or no improvement: \"Bronchodilators and/or steroid inhalers, \"Oral and/or IV steroids, and \"Ambulation with oximetry.    My Clinical findings support the need for the above equipment due to:  Hypoxia  "

## 2023-12-15 NOTE — PROGRESS NOTES
4 Eyes Skin Assessment Completed by ABELINO Nielson and ABELINO Verdin.    Head WDL  Ears WDL  Nose WDL  Mouth WDL  Neck WDL  Breast/Chest WDL  Shoulder Blades WDL  Spine WDL  (R) Arm/Elbow/Hand Redness, Blanching, and Bruising  (L) Arm/Elbow/Hand Redness and Blanching  Abdomen WDL  Groin WDL  Scrotum/Coccyx/Buttocks WDL  (R) Leg WDL  (L) Leg WDL  (R) Heel/Foot/Toe Redness and Blanching  (L) Heel/Foot/Toe Redness, Blanching, and Edema          Devices In Places Tele Box and Nasal Cannula       Interventions In Place Gray Ear Foams and Pillows    Possible Skin Injury No    Pictures Uploaded Into Epic N/A  Wound Consult Placed N/A  RN Wound Prevention Protocol Ordered No

## 2023-12-15 NOTE — ASSESSMENT & PLAN NOTE
Patient has been smoking for about 60+ years.  Patient has developed a nodule of the right lung base.  Patient underwent bronchoscopy for biopsy evaluation today  Preop and postop he was hypoxic and dropped down to 82% on room air.  Oxygen had to be provided for him for support  Patient will need at this point most likely oxygen to be arranged as an outpatient  Continue with nebulizer treatments and oxygen titration

## 2023-12-15 NOTE — H&P
Hospital Medicine History & Physical Note    Date of Service  12/14/2023    Primary Care Physician  Nate Burgess P.A.-C.    Consultants  pulmonary    Specialist Names: Dr. Daisy Gastelum    Code Status  Full Code    Chief Complaint  No chief complaint on file.      History of Presenting Illness  Jaskaran Le is a 78 y.o. male who presented 12/14/2023 with shortness of breath.  Patient has developed acute shortness of breath and he is a longstanding smoker and now has a lung nodule that was biopsied today.  Postprocedure the patient has dropped down to 82 to 83% on room air.  Patient is requiring oxygen support by facemask to get his oxygen saturations stabilized.  Patient has been counseled on smoking cessation at this point he understands that he is a chronic smoker and at this point refuses a patch or any other modality he says he has tried Chantix in the past none of that was worked.  The patient does have COPD and at this point will need nebulizer treatments oxygen support to stabilize the situation.    I discussed the plan of care with patient, bedside RN, and Dr. Daisy Gastelum .    Review of Systems  Review of Systems   Constitutional: Negative.  Negative for chills, diaphoresis and fever.   HENT: Negative.  Negative for nosebleeds and sinus pain.    Eyes: Negative.  Negative for double vision, photophobia, discharge and redness.   Respiratory:  Positive for cough, sputum production and shortness of breath. Negative for wheezing and stridor.    Cardiovascular:  Positive for leg swelling. Negative for chest pain, orthopnea and PND.   Gastrointestinal: Negative.  Negative for abdominal pain, blood in stool and heartburn.   Genitourinary: Negative.  Negative for dysuria, flank pain and frequency.   Musculoskeletal: Negative.  Negative for back pain, falls and myalgias.   Skin: Negative.  Negative for itching.   Neurological: Negative.  Negative for dizziness, tingling, sensory change, focal  weakness and seizures.   Endo/Heme/Allergies: Negative.  Negative for polydipsia. Does not bruise/bleed easily.   Psychiatric/Behavioral: Negative.  Negative for depression, substance abuse and suicidal ideas. The patient does not have insomnia.    All other systems reviewed and are negative.      Past Medical History   has a past medical history of Chronic obstructive pulmonary disease (HCC) and Dental disorder.    Surgical History   has a past surgical history that includes cataract phaco with iol (Bilateral) and vasectomy.     Family History  family history includes Cancer in his father; Dementia in his mother, sister, and sister; Heart Disease in his brother; Hypertension in his brother; Lung Disease in his father.   Family history reviewed with patient. There is family history that is pertinent to the chief complaint.     Social History   reports that he has been smoking cigarettes. He started smoking about 64 years ago. He has a 32.5 pack-year smoking history. He has never used smokeless tobacco. He reports that he does not drink alcohol and does not use drugs.    Allergies  No Known Allergies    Medications  Prior to Admission Medications   Prescriptions Last Dose Informant Patient Reported? Taking?   albuterol 108 (90 Base) MCG/ACT Aero Soln inhalation aerosol 12/7/2023  No No   Sig: Inhale 2 Puffs every 6 hours as needed for Shortness of Breath.   betamethasone valerate (VALISONE) 0.1 % Cream 12/7/2023  No No   Sig: Apply 1 Application topically 2 times a day.   fluticasone-salmeterol (ADVAIR DISKUS) 100-50 MCG/ACT AEROSOL POWDER, BREATH ACTIVATED 12/13/2023  Yes No   Sig: Inhale 1 Puff every 12 hours.   fluticasone-umeclidinium-vilanterol (TRELEGY ELLIPTA) 100-62.5-25 mcg/act inhaler 12/14/2023  No No   Sig: Inhale 1 Puff every day.      Facility-Administered Medications: None       Physical Exam  Temp:  [36 °C (96.8 °F)-36.6 °C (97.9 °F)] 36.5 °C (97.7 °F)  Pulse:  [71-81] 71  Resp:  [16-20] 17  BP:  ()/(54-64) 97/59  SpO2:  [83 %-98 %] 96 %  Blood Pressure : 97/59   Temperature: 36.5 °C (97.7 °F)   Pulse: 71   Respiration: 17   Pulse Oximetry: 96 %       Physical Exam  Vitals and nursing note reviewed.   Constitutional:       General: He is not in acute distress.     Appearance: Normal appearance. He is underweight. He is not ill-appearing or toxic-appearing.      Interventions: Face mask in place.   HENT:      Head: Normocephalic and atraumatic.      Right Ear: External ear normal.      Left Ear: External ear normal.      Nose: Nose normal.      Mouth/Throat:      Mouth: Mucous membranes are dry.      Pharynx: Oropharynx is clear.   Eyes:      Extraocular Movements: Extraocular movements intact.      Conjunctiva/sclera: Conjunctivae normal.      Pupils: Pupils are equal, round, and reactive to light.   Neck:      Thyroid: No thyromegaly.      Vascular: No JVD.   Cardiovascular:      Rate and Rhythm: Normal rate and regular rhythm.      Pulses: Normal pulses.      Heart sounds: Murmur heard.   Pulmonary:      Effort: Tachypnea present.      Breath sounds: Decreased air movement present. Examination of the right-upper field reveals decreased breath sounds and wheezing. Examination of the left-upper field reveals decreased breath sounds and wheezing. Examination of the right-middle field reveals decreased breath sounds and wheezing. Examination of the left-middle field reveals decreased breath sounds and wheezing. Examination of the right-lower field reveals decreased breath sounds, wheezing and rhonchi. Examination of the left-lower field reveals decreased breath sounds, wheezing and rhonchi. Decreased breath sounds, wheezing and rhonchi present. No rales.   Chest:      Chest wall: No tenderness.   Abdominal:      General: Bowel sounds are normal. There is no distension.      Palpations: There is no mass.      Tenderness: There is no abdominal tenderness. There is no guarding or rebound.   Musculoskeletal:  "        General: No tenderness. Normal range of motion.      Cervical back: Normal range of motion and neck supple.      Right lower le+ Edema present.      Left lower le+ Edema present.   Lymphadenopathy:      Cervical: No cervical adenopathy.   Skin:     General: Skin is warm and dry.      Capillary Refill: Capillary refill takes less than 2 seconds.      Findings: No erythema or rash.   Neurological:      General: No focal deficit present.      Mental Status: He is alert and oriented to person, place, and time. Mental status is at baseline.      GCS: GCS eye subscore is 4. GCS verbal subscore is 5. GCS motor subscore is 6.      Cranial Nerves: No cranial nerve deficit.      Deep Tendon Reflexes: Reflexes are normal and symmetric.   Psychiatric:         Mood and Affect: Mood normal.         Behavior: Behavior normal.         Thought Content: Thought content normal.         Judgment: Judgment normal.         Laboratory:  Recent Labs     23  1415   WBC 19.0*   RBC 4.26*   HEMOGLOBIN 11.8*   HEMATOCRIT 36.7*   MCV 86.2   MCH 27.7   MCHC 32.2*   RDW 50.9*   PLATELETCT 553*   MPV 8.2*     Recent Labs     23  1415   SODIUM 132*   POTASSIUM 4.1   CHLORIDE 96   CO2 25   GLUCOSE 97   BUN 6*   CREATININE 0.54   CALCIUM 8.5     Recent Labs     23  1415   GLUCOSE 97         No results for input(s): \"NTPROBNP\" in the last 72 hours.      No results for input(s): \"TROPONINT\" in the last 72 hours.    Imaging:  EC-ECHOCARDIOGRAM COMPLETE W/ CONT    (Results Pending)       X-Ray:  I have personally reviewed the images and compared with prior images.  EKG:  I have personally reviewed the images and compared with prior images.    Assessment/Plan:  Justification for Admission Status  I anticipate this patient will require at least two midnights for appropriate medical management, necessitating inpatient admission because patient is acute hypoxic respiratory failure after procedure and was already hypoxic " previous to the procedure.  Patient will need at this point oxygen to be arranged and his lung status to be stabilized this will take at least 48 hours of inpatient management.    Patient will need a Telemetry bed on MEDICAL service .  The need is secondary to acute hypoxic respiratory failure.    * Acute respiratory failure with hypoxemia (HCC)- (present on admission)  Assessment & Plan  Patient has been smoking for about 60+ years.  Patient has developed a nodule of the right lung base.  Patient underwent bronchoscopy for biopsy evaluation today  Preop and postop he was hypoxic and dropped down to 82% on room air.  Oxygen had to be provided for him for support  Patient will need at this point most likely oxygen to be arranged as an outpatient  Continue with nebulizer treatments and oxygen titration    COPD (chronic obstructive pulmonary disease) (HCC)  Assessment & Plan  Secondary to 60 years of tobacco use patient has developed COPD  Patient is on chronic albuterol for rescue inhaler  Patient uses Advair and Trelegy together after discussing with them that the medications are very similar we will only go with the Trelegy for now  I will add Mucinex to help him with his mucus expectoration  Continue oxygen support for now    Nodule of lower lobe of right lung- (present on admission)  Assessment & Plan  Status post biopsy results of which will be discussed at his next appointment with Dr. Gastelum    Bilateral lower extremity edema  Assessment & Plan  Patient has complaint of bilateral lower extremity edema as of late  Obtain an echocardiogram to make sure there is no underlying heart failure and also obtain a BNP level    Tobacco dependence- (present on admission)  Assessment & Plan  Patient reports 30 years of alcohol abuse and he quit 22 years ago and he has been sober ever since.  Discussed with him in detail smoking cessation counseling at this point he is not interested as he says he has tried nicotine patches  and Chantix in the past none of them worked.  He has been try to cut down slowly over time but this was not effective.  Smoking cessation discussion held for 13 minutes.        VTE prophylaxis: SCDs/TEDs

## 2023-12-15 NOTE — OR NURSING
1659: Patient arrived to PACU. Respirations even and unlabored. VSS.     1714: Respirations even and unlabored, denies pain/nausea, intermittent cough    1729:No change in status, respirations even and unlabored, pain remains tolerable, denies nausea    1735: Per MD Gastelum no x-ray needed, will be admitted by Dr. Wright    1750: Dr. Wright at bedside, meets criteria for transfer to unit, awaiting bed assignment    1805: No change in status, respirations even and unlabored, intermittent cough improving, slight blood tinged sputum, denies pain/nausea    1820: Report given to Connor [facility] Med/Tele    1832: Transported to Med/Tele

## 2023-12-15 NOTE — PROGRESS NOTES
4 Eyes Skin Assessment Completed by Connor RN and ABELINO Quintanilla.    Head WDL  Ears WDL  Nose WDL  Mouth WDL  Neck WDL  Breast/Chest WDL  Shoulder Blades WDL  Spine WDL  (R) Arm/Elbow/Hand WDL  (L) Arm/Elbow/Hand WDL  Abdomen WDL  Groin WDL  Scrotum/Coccyx/Buttocks Redness and Blanching  (R) Leg Edema  (L) Leg Edema  (R) Heel/Foot/Toe WDL  (L) Heel/Foot/Toe WDL          Devices In Places Tele Box      Interventions In Place TAP System and Low Air Loss Mattress    Possible Skin Injury No    Pictures Uploaded Into Epic Yes  Wound Consult Placed N/A  RN Wound Prevention Protocol Ordered No

## 2023-12-15 NOTE — DISCHARGE SUMMARY
Hospital Medicine Discharge Note     Admit Date:  12/14/2023       Discharge Date:   12/15/2023  LOS: 1 day     Primary Care Provider:    Nate Burgess P.A.-C.    Attending Physician:  Jenny Wan M.D.     Discharge Diagnoses:   Principal Problem:    Acute respiratory failure with hypoxemia (HCC)  Active Problems:    Tobacco dependence    Elevated cholesterol    Nodule of lower lobe of right lung    History of alcohol abuse    Bilateral lower extremity edema    COPD (chronic obstructive pulmonary disease) (HCC)        Hospital Summary (Brief Narrative):         79 yo w COPD , hx of >60 pk year hx, hx of RLL lung nodule that has since increased in size. Presented for bronchoscopy for biopsy and was noted to be hypoxic. Post procedurally was obs'd overnight. He initially required 5LNC but reduced to 2LNC at time of dc. Patient felt back to baseline. Did start on steroid course for possible COPD exacerbation as he had wheezing throughout his lung fields. Path was pending at time of dc. Oncology referral placed. Pt would like to know all his options but does not want to be aggressive. DME for home o2 arranged at time of dc.       Disposition:   Discharge home    Condition:  Stable    Activity:   As tolerated     Diet:   Regular    Discharge Medications:           Medication List        START taking these medications        Instructions   predniSONE 20 MG Tabs  Start taking on: December 16, 2023  Commonly known as: Deltasone   Take 2 Tablets by mouth every day for 4 days.  Dose: 40 mg            CONTINUE taking these medications        Instructions   Advair Diskus 100-50 MCG/ACT Aepb  Generic drug: fluticasone-salmeterol   Inhale 1 Puff every 12 hours.  Dose: 1 Puff     albuterol 108 (90 Base) MCG/ACT Aers inhalation aerosol   Inhale 2 Puffs every 6 hours as needed for Shortness of Breath.  Dose: 2 Puff     betamethasone valerate 0.1 % Crea  Commonly known as: Valisone   Apply 1 Application topically 2 times a  day.  Dose: 1 Application      Trelegy Ellipta 100-62.5-25 mcg/act inhaler  Generic drug: fluticasone-umeclidinium-vilanterol   Inhale 1 Puff every day.  Dose: 1 Puff                Follow up appointment details :      I encouraged him to call his PCP to confirm follow up after discharge.    Future Appointments   Date Time Provider Department Center   12/22/2023  1:40 PM Nate C Jenifer, P.A.-C. [facility]   2/28/2024  8:00 AM Nate C Jenifer, P.A.-C. [facility]         Consultants:      Pulm    Studies:    Imaging/ Testing:      EC-ECHOCARDIOGRAM COMPLETE W/O CONT   Final Result          Procedures:        Bronchoscopy      Instructions:      The were given instructions to return to the ER if patient's condition worsens      Time Spent on Discharge:     Discharge instructions were discussed with the patient at bedside. Patient  expressed understanding and agreed to comply with all discharge instructions.    37 minutes were spent in the discharge planning and management of this  patient, including more than 50% of the time spent face to face in   Counseling.

## 2023-12-15 NOTE — PROGRESS NOTES
Telemetry summary    Rhythm: SR/afib with RVR up to 163  Rate: 65-71  Ectopy: blocked PAC, rPAC, rPVC,   Measurements: 0.14/0.12/0.42    Normal Values  Rhythm: SR  HR Range:   Measurement: 0.12-0.2/0.06-0.10/0.30-0.52

## 2023-12-15 NOTE — ANESTHESIA POSTPROCEDURE EVALUATION
Patient: Jaskaran Le    Procedure Summary       Date: 12/14/23 Room / Location:  PROCEDURE ROOM / SURGERY [facility]    Anesthesia Start: 1542 Anesthesia Stop: 1703    Procedures:       FIBER OPTIC BRONCHOSCOPY WITH BRONCHOALVEOLAR LAVAGE, BIOPSY, FINE NEEDLE ASPIRATION AND ENDOBRONCHIAL ULTRASOUND (Chest)      ENDOBRONCHIAL ULTRASOUND (EBUS) (Chest) Diagnosis: (RIGHT LOWER LOBE LUNG MASS, pending lab results)    Surgeons: Daisy Gastelum M.D. Responsible Provider: Opal Johnson M.D.    Anesthesia Type: general ASA Status: 3            Final Anesthesia Type: general  Last vitals  BP   Blood Pressure : 100/55    Temp   36.4 °C (97.5 °F)    Pulse   67   Resp   18    SpO2   91 %      Anesthesia Post Evaluation    Patient location during evaluation: PACU  Patient participation: complete - patient participated  Level of consciousness: awake and alert  Pain score: 0    Airway patency: patent  Anesthetic complications: no  Cardiovascular status: adequate  Respiratory status: acceptable and face mask  Hydration status: acceptable    PONV: none          No notable events documented.     Nurse Pain Score: 0 (NPRS)

## 2023-12-15 NOTE — CARE PLAN
The patient is Stable - Low risk of patient condition declining or worsening    Shift Goals  Clinical Goals: monitor HR, admit profile, O2 needs  Patient Goals: rest and comfort  Family Goals: SAL    Progress made toward(s) clinical / shift goals:    Problem: Knowledge Deficit - Standard  Goal: Patient and family/care givers will demonstrate understanding of plan of care, disease process/condition, diagnostic tests and medications  Outcome: Progressing  Note: Patient updated on POC. Aox4, no complaints of pain. Admit profile complete. All questions and concerns addressed at this time.      Problem: Skin Integrity  Goal: Skin integrity is maintained or improved  Outcome: Progressing

## 2023-12-15 NOTE — DISCHARGE PLANNING
1230  Received Choice form at 1121  Agency/Facility Name: [facility] via communication management  Referral sent per Choice form @ [phone]

## 2023-12-15 NOTE — DISCHARGE PLANNING
Case Management Discharge Planning    Admission Date: 12/14/2023  GMLOS: 4.9  ALOS: 1    6-Clicks ADL Score: 22  6-Clicks Mobility Score: 19      Anticipated Discharge Dispo: Discharge Disposition: Discharged to home/self care (01)    DME Needed: Yes    DME Ordered: Yes    Action(s) Taken:     Home O2 referral faxed to [facility] by PAUL per choice form.    Spoke to Raymundo from [facility]. Per Raymundo, O2 ETA [id number].    Escalations Completed: None    Medically Clear: Yes    Next Steps:  f/u with medical team to discuss DC needs and barriers    Barriers to Discharge: Oxygen Delivery

## 2023-12-15 NOTE — ASSESSMENT & PLAN NOTE
Secondary to 60 years of tobacco use patient has developed COPD  Patient is on chronic albuterol for rescue inhaler  Patient uses Advair and Trelegy together after discussing with them that the medications are very similar we will only go with the Trelegy for now  I will add Mucinex to help him with his mucus expectoration  Continue oxygen support for now

## 2023-12-21 NOTE — TELEPHONE ENCOUNTER
"Called patient via [employer]. 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  It is advanced, at least stage III due to the size of primary mass alone.  After a long discussion over the phone he continues to say he is \"not interested in stopping the progress of his disease\" but then turns around to say \"if this is easily treated he would go ahead with treatment\".  I explained that we can't even tell what treatment we would offer without and PET and MRI brain or oncology appointments.  He prefers that I not order further imaging or referrals to oncology at this point.  I tried to explain to him that neither I, nor his PCP were experts in treatment options and that an appointment to discuss his diagnosis with oncology would be the most helpful to determine his willingness to proceed and he preferred that I don't.  He deferred any further discussion with me as he wants the opinion of his PCP tomorrow.    No referrals or imaging studies were ordered at the request of the patient.    Daisy Gastelum MD RD  Pulmonary and Critical Care    Available on Voalte    "

## 2023-12-22 PROBLEM — C34.91 NSCLC OF RIGHT LUNG (HCC): Status: ACTIVE | Noted: 2023-01-01

## 2023-12-22 PROBLEM — R91.1 NODULE OF LOWER LOBE OF RIGHT LUNG: Status: RESOLVED | Noted: 2022-08-30 | Resolved: 2023-01-01

## 2023-12-22 NOTE — ASSESSMENT & PLAN NOTE
This is a pleasant 78 male here today to follow-up on his recent hospitalization secondary to hypoxia.  Found to have exacerbation of his COPD but also worsening right lung mass which after biopsy which returned as a carcinoma.  He continues to smoke and despite giving up alcohol has been unable to stop smoking.  He does not wish any treatment except to keep him comfortable.  He has an appointment soon with imaging for PET scan and MRI.  He is not interested in following up with those.  He has an appointment to follow-up then on the 29th with Dr. Britton.  He is hesitant to see her.  He would like to see his pulmonologist, Dr. Silva.  Is currently using oxygen at nighttime.  He believes he may be at 3 L at home.  He is currently using both Advair and Trelegy.  Prefers Advair over Trelegy though.

## 2023-12-22 NOTE — PROGRESS NOTES
Subjective:   Jaskaran Le is a 78 y.o. male here today for lung cancer and COPD.    NSCLC of right lung (HCC)  This is a pleasant 78 male here today to follow-up on his recent hospitalization secondary to hypoxia.  Found to have exacerbation of his COPD but also worsening right lung mass which after biopsy which returned as a carcinoma.  He continues to smoke and despite giving up alcohol has been unable to stop smoking.  He does not wish any treatment except to keep him comfortable.  He has an appointment soon with imaging for PET scan and MRI.  He is not interested in following up with those.  He has an appointment to follow-up then on the 29th with Dr. Britton.  He is hesitant to see her.  He would like to see his pulmonologist, Dr. Silva.  Is currently using oxygen at nighttime.  He believes he may be at 3 L at home.  He is currently using both Advair and Trelegy.  Prefers Advair over Trelegy though.        Current medicines (including changes today)  Current Outpatient Medications   Medication Sig Dispense Refill    fluticasone-salmeterol (ADVAIR DISKUS) 100-50 MCG/ACT AEROSOL POWDER, BREATH ACTIVATED Inhale 1 Puff every 12 hours.      fluticasone-umeclidinium-vilanterol (TRELEGY ELLIPTA) 100-62.5-25 mcg/act inhaler Inhale 1 Puff every day. 180 Each 3    albuterol 108 (90 Base) MCG/ACT Aero Soln inhalation aerosol Inhale 2 Puffs every 6 hours as needed for Shortness of Breath. 8 g 11    betamethasone valerate (VALISONE) 0.1 % Cream Apply 1 Application topically 2 times a day. 45 g 3     No current facility-administered medications for this visit.     He  has a past medical history of Chronic obstructive pulmonary disease (HCC) and Dental disorder.    Social History and Family History were reviewed and updated.    ROS   No chest pain, no abdominal pain and all other systems were reviewed and are negative.       Objective:     /60 (BP Location: Left arm, Patient Position: Sitting, BP Cuff Size:  "Adult)   Pulse 77   Temp 36.7 °C (98.1 °F) (Temporal)   Ht 1.626 m (5' 4\")   Wt 62.4 kg (137 lb 9.6 oz)   SpO2 100%  Body mass index is 23.62 kg/m².   Physical Exam:  Constitutional: Alert, no distress.  Skin: Warm, dry, good turgor, no rashes in visible areas.  Eye: Equal, round and reactive, conjunctiva clear, lids normal.  ENMT: Lips without lesions, good dentition, oropharynx clear.  Neck: Trachea midline, no masses.   Respiratory: Unlabored respiratory effort, lungs with small lung sounds detected on the right lower aspect.  Mild wheezing noted.    Psych: Alert and oriented x3, normal affect and mood.        Assessment and Plan:   The following treatment plan was discussed    1. Hospital discharge follow-up  Status post discharge.  Stable.  Continue maintenance inhaler but advised to use either Advair or Trelegy but not both.  He will choose Advair.  Has enough medication.  Reached out to Dr. Silva regarding reaching out to him to schedule an appointment for follow-up.    2. NSCLC of right lung (HCC)  New condition noted in chart.  Likely chronic.  Discussed in length possible options for treatment.  But he is against any treatment.  He would like only to be in minimal discomfort.  He plans to continue his daily routine until he is unable to.  I urged him to follow-up with Dr. Britton given his prognosis.  I also per patient's request contacted radiology and canceled his follow-up appointment with imaging for MRI and PET scan.    3. Chronic obstructive pulmonary disease, unspecified COPD type (HCC)  Chronic condition with recent exacerbation.  Use either Advair or Trelegy but not both.  He will use Advair when Trelegy runs out.  Also advised him to follow-up with Dr. Silva who I contacted regarding reaching out to him to schedule an appointment.         Followup: Return in about 2 months (around 2/22/2024), or if symptoms worsen or fail to improve.    Please note that this dictation was created using " voice recognition software. I have made every reasonable attempt to correct obvious errors, but I expect that there are errors of grammar and possibly content that I did not discover before finalizing the note.

## 2023-12-29 NOTE — PROGRESS NOTES
Consult Note: Hematology/Oncology       Primary Care:  Nate Burgess P.A.-C.    No chief complaint on file.      Current Treatment: None    Prior Treatment: None    Subjective:   History of Presenting Illness:  Jaskaran Le is a 78 y.o. male past medical history of COPD who presents today with a recent diagnosis of lung cancer.    Patient reports that he was recently hospitalized secondary to hypoxia and was found to have an exacerbation of COPD.  He did have a previous right lower lobe lung nodule that was noticed to have increased in size during his admission.  On November 28 he had a CT chest without contrast that demonstrated a 7.9 x 10.3 cm mass in his right lower lobe (previously measuring 9x10mm in the RLL on 11/11/2022).  There is also concerns for mildly enlarged precarinal and mediastinal nodes measuring 10.4 and 10.6 mm    He presented for a bronchoscopy on December 14 and was observed overnight as he required 5 L of nasal cannula.  He was able to drop down to 2 L prior to discharge which is his baseline.  Pathology returned as poorly differentiated carcinoma in the right lower lobe.  No lymph nodes were determined to have carcinoma and 4R and 11 R were biopsied.    Patient has over 60-pack-year history.      Past Medical History:   Diagnosis Date    Chronic obstructive pulmonary disease (HCC)     Dental disorder     Dentures top/bottom        Past Surgical History:   Procedure Laterality Date    NM BRONCHOSCOPY,DIAGNOSTIC N/A 12/14/2023    Procedure: FIBER OPTIC BRONCHOSCOPY WITH BRONCHOALVEOLAR LAVAGE, BIOPSY, FINE NEEDLE ASPIRATION AND ENDOBRONCHIAL ULTRASOUND;  Surgeon: Daisy Gastelum M.D.;  Location: [facility];  Service: Pulmonary    ENDOBRONCHIAL US ADD-ON N/A 12/14/2023    Procedure: ENDOBRONCHIAL ULTRASOUND (EBUS);  Surgeon: Daisy Gastelum M.D.;  Location: [facility];  Service: Pulmonary    CATARACT PHACO WITH IOL Bilateral     VASECTOMY          Social History     Tobacco Use    Smoking status: Every Day     Current packs/day: 0.50     Average packs/day: 0.5 packs/day for 65.0 years (32.5 ttl pk-yrs)     Types: Cigarettes     Start date: 1959    Smokeless tobacco: Never    Tobacco comments:     since age 14.   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      fluticasone-umeclidinium-vilanterol (TRELEGY ELLIPTA) 100-62.5-25 mcg/act inhaler Inhale 1 Puff every day. 180 Each 3    albuterol 108 (90 Base) MCG/ACT Aero Soln inhalation aerosol Inhale 2 Puffs every 6 hours as needed for Shortness of Breath. 8 g 11    betamethasone valerate (VALISONE) 0.1 % Cream Apply 1 Application topically 2 times a day. 45 g 3     No current facility-administered medications for this visit.       Review of Systems   Constitutional:  Positive for weight loss. Negative for chills, fever and malaise/fatigue.   HENT:  Negative for congestion, ear pain, nosebleeds and sore throat.    Eyes:  Negative for blurred vision.   Respiratory:  Positive for cough (COPD), sputum production and shortness of breath. Negative for wheezing.    Cardiovascular:  Negative for chest pain, palpitations, orthopnea and leg swelling.   Gastrointestinal:  Negative for abdominal pain, heartburn, nausea and vomiting.   Genitourinary:  Negative for  dysuria, frequency and urgency.   Musculoskeletal:  Negative for neck pain.   Neurological:  Negative for dizziness, tingling, tremors, sensory change, focal weakness and headaches.   Endo/Heme/Allergies:  Does not bruise/bleed easily.   Psychiatric/Behavioral:  Negative for depression, memory loss and suicidal ideas.    All other systems reviewed and are negative.      Problem list, medications, and allergies reviewed by myself today in Epic.     Objective:   There were no vitals filed for this visit.    DESC; KARNOFSKY SCALE WITH ECOG EQUIVALENT: 70, Cares for self; unable to carry on normal activity or to do active work (ECOG equivalent 1)    DISTRESS LEVEL: no apparent distress    Physical Exam  Constitutional:       General: He is not in acute distress.     Appearance: Normal appearance. He is not ill-appearing.   HENT:      Head: Normocephalic and atraumatic.      Nose: Nose normal. No congestion.      Mouth/Throat:      Mouth: Mucous membranes are moist.      Pharynx: Oropharynx is clear.   Eyes:      General: No scleral icterus.     Conjunctiva/sclera: Conjunctivae normal.      Pupils: Pupils are equal, round, and reactive to light.   Cardiovascular:      Rate and Rhythm: Normal rate and regular rhythm.      Pulses: Normal pulses.      Heart sounds: No murmur heard.     No friction rub.   Pulmonary:      Effort: Pulmonary effort is normal. No respiratory distress.      Breath sounds: No stridor. Wheezing present. No rales.      Comments: Decreased breath sounds  Chest:      Chest wall: No tenderness.   Abdominal:      General: Abdomen is flat. Bowel sounds are normal. There is no distension.      Palpations: Abdomen is soft. There is no mass.      Tenderness: There is no abdominal tenderness. There is no guarding or rebound.   Musculoskeletal:         General: No swelling, tenderness or deformity. Normal range of motion.      Cervical back: Normal range of motion and neck supple. No rigidity or tenderness.       Right lower leg: No edema.      Left lower leg: No edema.   Skin:     General: Skin is warm.      Coloration: Skin is not jaundiced or pale.      Findings: No bruising or rash.   Neurological:      General: No focal deficit present.      Mental Status: He is alert and oriented to person, place, and time. Mental status is at baseline.      Motor: No weakness.   Psychiatric:         Mood and Affect: Mood normal.         Behavior: Behavior normal.         Thought Content: Thought content normal.         Judgment: Judgment normal.         Labs:   Most recent labs reviewed.    Leukocytosis  Anemia  thrombocytosis    Imaging:   Most recent images below have been independently reviewed by me.     CT Chest  IMPRESSION:     1.  Since previous examination, 7.9 x 10.3 cm mass right lower lobe concerning for malignant neoplasm. Recommend consideration PET/CT for further evaluation.     2.  Small right pleural effusion.     3.  Hyperexpanded and emphysematous lungs.     4.  Mediastinal adenopathy.     5.  Large calcified mass left upper quadrant of the abdomen unchanged.    Pathology:  FINAL DIAGNOSIS:     A. Right lower lobe mass forceps biopsy:          Poorly differentiated carcinoma.          See comment.   B. Station 7 fine needle aspiration slides:          Increased lymphocytes on some smears.          No evidence of malignancy.   C. Station 7 fine needle aspiration core:          [place] lymphoid cellularity.          No evidence of malignancy.   D. 4R fine needle aspiration slide:          Increased lymphocytes on some smears.          No evidence of malignancy.   E. 4R fine needle aspiration core:          [place] lymphoid cellularity.          No evidence of malignancy.   F. 11R fine needle aspiration slide:          Increased lymphocytes.          No evidence of malignancy.   G. 11R fine needle aspiration core:          [place] lymphoid cellularity.          No evidence of malignancy.   H. Right middle lobe  bronchoalveolar lavage:          Occasional atypical cells, suspicious for malignancy     Assessment/Plan:     Cancer Staging   No matching staging information was found for the patient.     Mr. Le is a 78-year-old male with a recent diagnosis of carcinoma of the lung.    Today I discussed with the patient the results of his pathology.  This reveals that he has carcinoma of the lung however is unclear whether it is squamous or adeno.    We then discussed staging.  Unfortunately I only have a CT scan which shows a large mass in his right lower lobe as well as mediastinal adenopathy.  Patient was scheduled to have a PET scan yesterday as well as a brain MRI but canceled it.  I explained to the patient that these are necessary for staging.    Of course I will need to review the PET scan and brain MRI but at minimum patient has stage III disease.  I discussed with him that the treatment for stage III is either surgical resection or chemoradiation.  Of course if he has disease outside of his chest he will be a stage IV and treatment decisions will change.    I discussed with him that based on his most recent pulmonary function test as well as his underlying COPD he may not be a good candidate for surgery and chemoradiation may be a better option.    Would like to defer specific discussions of treatment to let me know his stage for sure.  I would also like sent his tumor off for NGS and foundation 1.    After a long discussion patient he is not interested in treatment.  He does not want to quit smoking.  He doesn't want to undergo chemotherapy or radiation.  We discussed targeted therapy and PDL1; he is not interested. He is very comfortable with this decision.     He is interested in palliative care, but no further treatment.      Plan  -palliative care referral  -patient to contact me in the future if needed    Any questions and concerns raised by the patient were addressed and answered. Patient denies any  further questions.  Patient encouraged to call the office with any concerns or issues.     Kathy Britton M.D.  Hematology/Oncology      45 minutes was spent on this visit

## 2023-12-29 NOTE — ADDENDUM NOTE
Encounter addended by: Ricardo Palma on: 12/29/2023 1:43 PM   Actions taken: Charge Capture section accepted

## 2024-01-01 ENCOUNTER — HOME CARE VISIT (OUTPATIENT)
Dept: HOSPICE | Facility: HOSPICE | Age: 79
End: 2024-01-01
Payer: MEDICARE

## 2024-01-01 ENCOUNTER — HOSPICE ADMISSION (OUTPATIENT)
Dept: HOSPICE | Facility: HOSPICE | Age: 79
End: 2024-01-01
Payer: MEDICARE

## 2024-01-01 ENCOUNTER — HOSPITAL ENCOUNTER (OUTPATIENT)
Dept: HEMATOLOGY ONCOLOGY | Facility: MEDICAL CENTER | Age: 79
End: 2024-01-08
Attending: FAMILY MEDICINE
Payer: MEDICARE

## 2024-01-01 ENCOUNTER — PHARMACY VISIT (OUTPATIENT)
Dept: PHARMACY | Facility: MEDICAL CENTER | Age: 79
End: 2024-01-01
Payer: COMMERCIAL

## 2024-01-01 VITALS — DIASTOLIC BLOOD PRESSURE: 64 MMHG | SYSTOLIC BLOOD PRESSURE: 132 MMHG | HEART RATE: 96 BPM | RESPIRATION RATE: 24 BRPM

## 2024-01-01 VITALS — RESPIRATION RATE: 20 BRPM

## 2024-01-01 VITALS — RESPIRATION RATE: 17 BRPM | HEART RATE: 80 BPM

## 2024-01-01 VITALS — HEART RATE: 80 BPM | RESPIRATION RATE: 18 BRPM

## 2024-01-01 VITALS
OXYGEN SATURATION: 73 % | RESPIRATION RATE: 12 BRPM | SYSTOLIC BLOOD PRESSURE: 122 MMHG | WEIGHT: 146 LBS | HEIGHT: 64 IN | TEMPERATURE: 96.8 F | BODY MASS INDEX: 24.92 KG/M2 | DIASTOLIC BLOOD PRESSURE: 58 MMHG | HEART RATE: 85 BPM

## 2024-01-01 VITALS — HEART RATE: 76 BPM | RESPIRATION RATE: 20 BRPM

## 2024-01-01 VITALS — RESPIRATION RATE: 16 BRPM | HEART RATE: 76 BPM

## 2024-01-01 VITALS — RESPIRATION RATE: 24 BRPM

## 2024-01-01 VITALS — SYSTOLIC BLOOD PRESSURE: 108 MMHG | DIASTOLIC BLOOD PRESSURE: 62 MMHG | HEART RATE: 88 BPM | RESPIRATION RATE: 24 BRPM

## 2024-01-01 VITALS — HEART RATE: 70 BPM | RESPIRATION RATE: 18 BRPM

## 2024-01-01 DIAGNOSIS — Z71.89 GOALS OF CARE, COUNSELING/DISCUSSION: ICD-10-CM

## 2024-01-01 DIAGNOSIS — J44.9 CHRONIC OBSTRUCTIVE PULMONARY DISEASE, UNSPECIFIED COPD TYPE (HCC): ICD-10-CM

## 2024-01-01 DIAGNOSIS — C34.91 NSCLC OF RIGHT LUNG (HCC): ICD-10-CM

## 2024-01-01 DIAGNOSIS — L20.82 FLEXURAL ECZEMA: ICD-10-CM

## 2024-01-01 DIAGNOSIS — C34.91 NON-SMALL CELL CANCER OF RIGHT LUNG (HCC): ICD-10-CM

## 2024-01-01 LAB
FUNGUS SPEC CULT: NORMAL
FUNGUS SPEC FUNGUS STN: NORMAL
MYCOBACTERIUM SPEC CULT: NORMAL
RHODAMINE-AURAMINE STN SPEC: NORMAL
SIGNIFICANT IND 70042: NORMAL
SIGNIFICANT IND 70042: NORMAL
SITE SITE: NORMAL
SITE SITE: NORMAL
SOURCE SOURCE: NORMAL
SOURCE SOURCE: NORMAL

## 2024-01-01 PROCEDURE — G0299 HHS/HOSPICE OF RN EA 15 MIN: HCPCS

## 2024-01-01 PROCEDURE — RXMED WILLOW AMBULATORY MEDICATION CHARGE: Performed by: REHABILITATION PRACTITIONER

## 2024-01-01 PROCEDURE — S9126 HOSPICE CARE, IN THE HOME, P: HCPCS

## 2024-01-01 PROCEDURE — G0155 HHCP-SVS OF CSW,EA 15 MIN: HCPCS

## 2024-01-01 PROCEDURE — 99215 OFFICE O/P EST HI 40 MIN: CPT | Performed by: FAMILY MEDICINE

## 2024-01-01 PROCEDURE — G0156 HHCP-SVS OF AIDE,EA 15 MIN: HCPCS

## 2024-01-01 PROCEDURE — 99212 OFFICE O/P EST SF 10 MIN: CPT | Performed by: FAMILY MEDICINE

## 2024-01-01 PROCEDURE — 665036 HSPC NOTICE OF ELECTION NOE

## 2024-01-01 RX ORDER — IPRATROPIUM BROMIDE AND ALBUTEROL SULFATE 2.5; .5 MG/3ML; MG/3ML
3 SOLUTION RESPIRATORY (INHALATION) EVERY 4 HOURS PRN
Qty: 90 EACH | Refills: 23 | Status: SHIPPED | OUTPATIENT
Start: 2024-01-01 | End: 2025-01-16

## 2024-01-01 RX ORDER — ACETAMINOPHEN 500 MG
1000 TABLET ORAL EVERY 6 HOURS PRN
Qty: 30 TABLET | Refills: 10 | Status: SHIPPED | OUTPATIENT
Start: 2024-01-01 | End: 2025-01-16

## 2024-01-01 RX ORDER — FLUTICASONE PROPIONATE AND SALMETEROL 100; 50 UG/1; UG/1
1 POWDER RESPIRATORY (INHALATION) EVERY 12 HOURS
Qty: 60 EACH | Refills: 0 | Status: SHIPPED | OUTPATIENT
Start: 2024-01-01 | End: 2024-02-14

## 2024-01-01 RX ORDER — LORAZEPAM 2 MG/ML
1-2 CONCENTRATE ORAL
Qty: 360 ML | Refills: 0 | Status: SHIPPED | OUTPATIENT
Start: 2024-01-01 | End: 2025-01-16

## 2024-01-01 RX ORDER — MORPHINE SULFATE 100 MG/5ML
10-20 SOLUTION ORAL
Qty: 240 ML | Refills: 0 | Status: SHIPPED | OUTPATIENT
Start: 2024-01-01 | End: 2025-01-16

## 2024-01-01 RX ORDER — BISACODYL 10 MG
10 SUPPOSITORY, RECTAL RECTAL PRN
Qty: 5 SUPPOSITORY | Refills: 10 | Status: SHIPPED | OUTPATIENT
Start: 2024-01-01 | End: 2025-01-16

## 2024-01-01 RX ORDER — GUAIFENESIN AND DEXTROMETHORPHAN HYDROBROMIDE 100; 10 MG/5ML; MG/5ML
15 SOLUTION ORAL EVERY 4 HOURS PRN
Qty: 237 ML | Refills: 23 | Status: SHIPPED | OUTPATIENT
Start: 2024-01-01 | End: 2025-01-16

## 2024-01-01 RX ORDER — SENNA AND DOCUSATE SODIUM 50; 8.6 MG/1; MG/1
2 TABLET, FILM COATED ORAL 2 TIMES DAILY PRN
Qty: 28 TABLET | Refills: 10 | Status: SHIPPED | OUTPATIENT
Start: 2024-01-01 | End: 2025-01-16

## 2024-01-01 RX ORDER — ACETAMINOPHEN 650 MG/1
650 SUPPOSITORY RECTAL EVERY 6 HOURS PRN
Qty: 5 SUPPOSITORY | Refills: 10 | Status: SHIPPED | OUTPATIENT
Start: 2024-01-01

## 2024-01-01 RX ORDER — ONDANSETRON 4 MG/1
4 TABLET, ORALLY DISINTEGRATING ORAL EVERY 6 HOURS PRN
Qty: 15 TABLET | Refills: 10 | Status: SHIPPED | OUTPATIENT
Start: 2024-01-01 | End: 2025-01-16

## 2024-01-01 RX ORDER — ALBUTEROL SULFATE 90 UG/1
2 AEROSOL, METERED RESPIRATORY (INHALATION) EVERY 4 HOURS PRN
Qty: 8 G | Refills: 23 | Status: SHIPPED | OUTPATIENT
Start: 2024-01-01 | End: 2025-01-16

## 2024-01-01 SDOH — ECONOMIC STABILITY: GENERAL

## 2024-01-01 SDOH — ECONOMIC STABILITY: HOUSING INSECURITY: EVIDENCE OF SMOKING MATERIAL: 0

## 2024-01-01 ASSESSMENT — ENCOUNTER SYMPTOMS
LOWER EXTREMITY EDEMA: 1
FORGETFULNESS: 1
STOOL FREQUENCY: TWICE DAILY
NERVOUS/ANXIOUS: 0
MOTTLING: 1
LAST BOWEL MOVEMENT: 66856
FATIGUE: 1
DECREASED ORAL INTAKE: 1
LOWEST PAIN SEVERITY IN PAST 24 HOURS: 0/10
DEPRESSION: 0
WEIGHT LOSS: 0
STOOL FREQUENCY: DAILY
LAST BOWEL MOVEMENT: 66857
DENIES PAIN: 1
PAIN: 1
PAIN SEVERITY GOAL: 0/10
BOWEL PATTERN NORMAL: 1
SLEEP QUALITY: ADEQUATE
MUSCLE WEAKNESS: 1
SHORTNESS OF BREATH: 1
STOOL FREQUENCY: DAILY
SHORTNESS OF BREATH: 1
INCREASED FATIGUE: 1
BLURRED VISION: 0
SLEEP QUALITY: ADEQUATE
FORGETFULNESS: 1
FEVER: 0
LOWER EXTREMITY EDEMA: 1
DENIES PAIN: 1
SHORTNESS OF BREATH: 1
LAST BOWEL MOVEMENT: 66875
DENIES PAIN: 1
DENIES PAIN: 1
APNEIC BREATHING: 1
INCREASED FATIGUE: 1
STOOL FREQUENCY: DAILY
DECREASED ORAL INTAKE: 1
DENIES PAIN: 1
COUGH: 1
PERSON REPORTING PAIN: PATIENT
DYSPNEA ACTIVITY LEVEL: AT REST
FORGETFULNESS: 1
PERSON REPORTING PAIN: PATIENT
FATIGUE: 1
BRUISES/BLEEDS EASILY: 0
DYSPNEA ACTIVITY LEVEL: AFTER AMBULATING 10 - 20 FT
SHORTNESS OF BREATH: 1
DIARRHEA: 1
MUSCLE WEAKNESS: 1
BOWEL PATTERN NORMAL: 1
PERSON REPORTING PAIN: PATIENT
SLEEP QUALITY: ADEQUATE
DENIES PAIN: 1
DESCRIPTION OF MEMORY LOSS: SHORT TERM
DYSPNEA ACTIVITY LEVEL: AFTER AMBULATING MORE THAN 20 FT
MUSCLE WEAKNESS: 1
DENIES PAIN: 1
HIGHEST PAIN SEVERITY IN PAST 24 HOURS: 0/10
FATIGUE: 1
PERSON REPORTING PAIN: PATIENT
DENIES PAIN: 1
BOWEL PATTERN NORMAL: 1
STOOL FREQUENCY: DAILY
PAIN SEVERITY GOAL: 0/10
DYSPNEA ACTIVITY LEVEL: AFTER AMBULATING MORE THAN 20 FT
DYSPNEA ON EXERTION: 1
FATIGUE: 1
SLEEP QUALITY: ADEQUATE
VOMITING: 0
SLEEP QUALITY: ADEQUATE
NAUSEA: 0
PERSON REPORTING PAIN: PATIENT
PERSON REPORTING PAIN: PATIENT
HIGHEST PAIN SEVERITY IN PAST 24 HOURS: 0/10
DIARRHEA: 0
MUSCLE WEAKNESS: 1
LAST BOWEL MOVEMENT: 66861
MUSCLE WEAKNESS: 1
LAST BOWEL MOVEMENT: 66881
PERSON REPORTING PAIN: DIRECT OBSERVATION
DECREASED TO NO URINARY OUTPUT: 1
DYSPNEA ACTIVITY LEVEL: AFTER AMBULATING LESS THAN 10 FT
LOWEST PAIN SEVERITY IN PAST 24 HOURS: 0/10
CONSTIPATION: 0
DYSPNEA ACTIVITY LEVEL: AT REST
SHORTNESS OF BREATH: 1
SLEEP QUALITY: ADEQUATE
LAST BOWEL MOVEMENT: 66868
SHORTNESS OF BREATH: 1
SHORTNESS OF BREATH: 1
CHILLS: 0
HEADACHES: 0
LOWER EXTREMITY EDEMA: 1
SLEEP QUALITY: ADEQUATE
SUBJECTIVE PAIN PROGRESSION: UNCHANGED
PERSON REPORTING PAIN: PATIENT
LOWER EXTREMITY EDEMA: 1
DYSPNEA ON EXERTION: 1
FATIGUES EASILY: 1
MUSCLE WEAKNESS: 1
BOWEL PATTERN NORMAL: 1
INCREASED SLEEPING: 1
SUBJECTIVE PAIN PROGRESSION: UNCHANGED
SHORTNESS OF BREATH: 1
BACK PAIN: 0
PERSON REPORTING PAIN: PATIENT
INCREASED SLEEPING: 1
COUGH: 1
PALPITATIONS: 0
SLEEP QUALITY: ADEQUATE
DIARRHEA: 1

## 2024-01-01 ASSESSMENT — SOCIAL DETERMINANTS OF HEALTH (SDOH)
ACTIVE STRESSOR - NO STRESS FACTORS: 1

## 2024-01-01 ASSESSMENT — ACTIVITIES OF DAILY LIVING (ADL)
CURRENT_FUNCTION: STAND BY ASSIST
CURRENT_FUNCTION: STAND BY ASSIST
MONEY MANAGEMENT (EXPENSES/BILLS): TOTALLY DEPENDENT
CURRENT_FUNCTION: ONE PERSON
MONEY MANAGEMENT (EXPENSES/BILLS): TOTALLY DEPENDENT
AMBULATION ASSISTANCE: STAND BY ASSIST
AMBULATION ASSISTANCE: STAND BY ASSIST
AMBULATION ASSISTANCE: ONE PERSON
CURRENT_FUNCTION: STAND BY ASSIST
AMBULATION ASSISTANCE: STAND BY ASSIST

## 2024-01-01 ASSESSMENT — FIBROSIS 4 INDEX: FIB4 SCORE: 0.52

## 2024-01-01 ASSESSMENT — PAIN SCALES - GENERAL: PAINLEVEL: NO PAIN

## 2024-01-01 ASSESSMENT — PAIN SCALES - PAIN ASSESSMENT IN ADVANCED DEMENTIA (PAINAD)
FACIALEXPRESSION: 0 - SMILING OR INEXPRESSIVE.
TOTALSCORE: 1
BODYLANGUAGE: 0 - RELAXED.
NEGVOCALIZATION: 0 - NONE.
CONSOLABILITY: 0 - NO NEED TO CONSOLE.

## 2024-01-01 ASSESSMENT — PATIENT HEALTH QUESTIONNAIRE - PHQ9
CLINICAL INTERPRETATION OF PHQ2 SCORE: 0
CLINICAL INTERPRETATION OF PHQ2 SCORE: 0

## 2024-01-01 ASSESSMENT — COPD QUESTIONNAIRES: COPD: 1

## 2024-01-03 NOTE — DOCUMENTATION QUERY
[facility]                                                                       Query Response Note      PATIENT:               PARMINDER PARKINSON  ACCT #:                  [id number]  MRN:                     [id number]  :                      1945  ADMIT DATE:       2023 1:05 PM  DISCH DATE:        12/15/2023 4:00 PM  RESPONDING  PROVIDER #:        [id number]           QUERY TEXT:    Pt has documented R lung malignancy in Bronchoscopy Note on .  It is not documented in the Discharge Summary.    Please clarify status of this condition.       The patient's Clinical Indicators include:  Clinical Indicators:  RLL bronchus 100% obstructed by tumor invasion.  Acute respiratory failure with hypoxia.  COPD w/acute exacerbation.  RLL lung mass w/hilar adenopathy.    Treatment:  O2.  Bronchoscopy and EBUS.    Risk Factors:  Cigarette smoker x60 years.    [email or web address]  Options provided:   -- RLL malignancy is ruled in   -- RLL malignancy is ruled out   -- Other explanation, (please specify other explanation)   -- Unable to determine      Query created by: Miriam Dial on 2023 12:35 PM    RESPONSE TEXT:    RLL malignancy is ruled in          Electronically signed by:  RAMA GUNDERSON 1/3/2024 2:31 PM

## 2024-01-08 PROBLEM — Z71.89 GOALS OF CARE, COUNSELING/DISCUSSION: Status: ACTIVE | Noted: 2024-01-01

## 2024-01-08 NOTE — PROGRESS NOTES
Date & Time note created:    1/8/2024   8:42 AM       Requesting Provider: Dr. Britton  Reason for Referral: Goals of care    Chief Complaint: I have cancer and not pursuing cancer treatments  HPI:   Jaskaran Le is a 78 y.o. male presenting to palliative care clinic for goals of care conversation secondary to newly diagnosed lung cancer.  The role of palliative care was discussed and he was open to a conversation.    The patient shared that he has had a nodule in his lung that was followed for many years and began to progress and workup identified as lung cancer.  He has decided that not pursuing disease modifying treatments is aligned with his goals and wishes.  He reports he has lived a long and satisfying life and does not want to reach quality through cancer treatments but with a comfort focused only approach to his care.  He shared his life story including growing up in [place] as well as time working as a  in the [employer].  He currently lives in [place] where he has many children grandchildren and great-grandchildren.  2 of his grandchildren even live in the same apartment complex he is and he often's babysits his great grandchildren.  He exercises daily by walking to the gym that is nearby where he often swims.  He expressed a clear understanding that he has many health problems and has slowly had a decrease in his functioning and that things could worsen as his cancer progresses.  We then discussed advance care planning and he reports that he has completed an advanced directive that lists his daughter.  We then ran through scenarios of worsening health to determine his wishes on the extent of healthcare treatments to receive.  Through that conversation we are then able to complete a POLST form which indicates DNR/DNI, selective treatments for reversible causes, and no artificial nutrition at the end of life.  He currently reports no significant distressing symptoms  and also reports he has a good relationship with his primary care provider.  Through shared decision making it was agreed that he would follow-up with palliative care as needed but would continue seeing his primary care provider for his needs.  We briefly discussed hospice and when to enroll and he feels comfortable about asking his primary care provider for that referral when the time comes.    Oncological history: Non-small cell lung cancer.    Past Medical History:   Diagnosis Date    Chronic obstructive pulmonary disease (HCC)     Dental disorder     Dentures top/bottom     Past Surgical History:   Procedure Laterality Date    MS BRONCHOSCOPY,DIAGNOSTIC N/A 12/14/2023    Procedure: FIBER OPTIC BRONCHOSCOPY WITH BRONCHOALVEOLAR LAVAGE, BIOPSY, FINE NEEDLE ASPIRATION AND ENDOBRONCHIAL ULTRASOUND;  Surgeon: Daisy Gastelum M.D.;  Location: [facility];  Service: Pulmonary    ENDOBRONCHIAL US ADD-ON N/A 12/14/2023    Procedure: ENDOBRONCHIAL ULTRASOUND (EBUS);  Surgeon: Daisy Gastelum M.D.;  Location: [facility];  Service: Pulmonary    CATARACT PHACO WITH IOL Bilateral     VASECTOMY       Current Medications:  Current Outpatient Medications on File Prior to Encounter   Medication Sig Dispense Refill    fluticasone-salmeterol (ADVAIR DISKUS) 100-50 MCG/ACT AEROSOL POWDER, BREATH ACTIVATED Inhale 1 Puff every 12 hours.      fluticasone-umeclidinium-vilanterol (TRELEGY ELLIPTA) 100-62.5-25 mcg/act inhaler Inhale 1 Puff every day. 180 Each 3    albuterol 108 (90 Base) MCG/ACT Aero Soln inhalation aerosol Inhale 2 Puffs every 6 hours as needed for Shortness of Breath. 8 g 11    betamethasone valerate (VALISONE) 0.1 % Cream Apply 1 Application topically 2 times a day. 45 g 3     No current facility-administered medications on file prior to encounter.     Medication Allergy/Sensitivities:  No Known Allergies  Family History   Problem Relation Age of Onset    Dementia Mother     Lung Disease Father  "    Cancer Father         Lung CA    Dementia Sister     Heart Disease Brother     Hypertension Brother     Dementia Sister         Alzheimer's    Diabetes Neg Hx        Social History: Lives in [place] as support of his children and grandchildren and great-grandchildren.  Does have history of alcohol use but discontinued it many years ago.  Does continue to smoke.  Is active and goes to the gym daily.  Did work as a  and owns his own business.    Palliative Performance Scale: 100%  ECO    ROS:    Review of Systems   Constitutional:  Negative for chills, fever and weight loss.   HENT:  Negative for congestion and hearing loss.    Eyes:  Negative for blurred vision.   Respiratory:  Positive for cough and shortness of breath.    Cardiovascular:  Negative for chest pain and palpitations.   Gastrointestinal:  Negative for constipation, diarrhea, nausea and vomiting.   Musculoskeletal:  Negative for back pain and joint pain.   Skin:  Negative for rash.   Neurological:  Negative for headaches.   Endo/Heme/Allergies:  Does not bruise/bleed easily.   Psychiatric/Behavioral:  Negative for depression. The patient is not nervous/anxious.        PE:   Recent vital signs  Weight/BMI: Body mass index is 25.06 kg/m².  /58   Pulse 85   Temp 36 °C (96.8 °F) (Temporal)   Resp 12   Ht 1.626 m (5' 4\")   Wt 66.2 kg (146 lb)   SpO2 (!) 73%   BMI 25.06 kg/m²   Vitals:    24 0737   BP: 122/58   Pulse: 85   Resp: 12   Temp: 36 °C (96.8 °F)   TempSrc: Temporal   SpO2: (!) 73%   Weight: 66.2 kg (146 lb)   Height: 1.626 m (5' 4\")     Oxygen Therapy:  Pulse Oximetry: (!) 73 %  Physical Exam  Constitutional:       Appearance: Normal appearance. He is normal weight.   HENT:      Head: Normocephalic and atraumatic.      Mouth/Throat:      Mouth: Mucous membranes are moist.      Pharynx: Oropharynx is clear.   Eyes:      Extraocular Movements: Extraocular movements intact.      Pupils: Pupils are equal, round, and " reactive to light.   Cardiovascular:      Rate and Rhythm: Normal rate.   Pulmonary:      Effort: Pulmonary effort is normal.      Comments: Mild increased work of breathing.  Abdominal:      General: Abdomen is flat.   Musculoskeletal:         General: Normal range of motion.      Cervical back: Normal range of motion.   Skin:     General: Skin is warm.   Neurological:      General: No focal deficit present.      Mental Status: He is alert.   Psychiatric:         Mood and Affect: Mood normal.         Behavior: Behavior normal.       ASSESSMENT/PLAN WITH SHARED DECISION MAKING:   Goals of care, counseling/discussion  Patient with recent diagnosis of non-small cell lung cancer.  Patient has elected to not undergo disease modifying treatments for his cancer.  Patient is clear in his goals and wishes and do align with his values.  Did discuss with patient advance care planning and he currently has an advanced directive completed.  In the office today we completed a POLST form which indicated DNR/DNI status, selective treatments for reversible causes, and no artificial nutrition at the end of life.  Has no significant symptom needs at this time.  Patient will plan to follow-up with his primary care provider.  Did discuss that when it is time any of his providers could submit a referral to hospice.  Will follow-up with palliative medicine as needed.    NSCLC of right lung (HCC)  Patient with newly diagnosed non-small cell lung cancer.  Not currently pursuing disease modifying treatments.  No further testing or imaging planned at this time.    46 minutes in total spent on patient encounter including chart review and consultation with patient's oncological providers.

## 2024-01-08 NOTE — ASSESSMENT & PLAN NOTE
Patient with newly diagnosed non-small cell lung cancer.  Not currently pursuing disease modifying treatments.  No further testing or imaging planned at this time.

## 2024-01-08 NOTE — ASSESSMENT & PLAN NOTE
Patient with recent diagnosis of non-small cell lung cancer.  Patient has elected to not undergo disease modifying treatments for his cancer.  Patient is clear in his goals and wishes and do align with his values.  Did discuss with patient advance care planning and he currently has an advanced directive completed.  In the office today we completed a POLST form which indicated DNR/DNI status, selective treatments for reversible causes, and no artificial nutrition at the end of life.  Has no significant symptom needs at this time.  Patient will plan to follow-up with his primary care provider.  Did discuss that when it is time any of his providers could submit a referral to hospice.  Will follow-up with palliative medicine as needed.

## 2024-01-17 NOTE — PROGRESS NOTES
Patient discussed with Jodi Doe RN. Patient admitted to [facility] with primary diagnosis of right lung small cell carcinoma. Comfort medications ordered for delivery today.

## (undated) DEVICE — WATER IRRIGATION STERILE 1000ML (12EA/CA)

## (undated) DEVICE — TOWEL STOP TIMEOUT SAFETY FLAG (40EA/CA)

## (undated) DEVICE — Device

## (undated) DEVICE — CATHETER IV SAFETY 22 GA X 1 (50EA/BX)

## (undated) DEVICE — SYRINGE SAFETY 3 ML 18 GA X 1 1/2 BLUNT LL (100/BX 8BX/CA)

## (undated) DEVICE — TUBING CLEARLINK DUO-VENT - C-FLO (48EA/CA)

## (undated) DEVICE — TUBE E-T HI-LO CUFF 7.5MM (10EA/PK)

## (undated) DEVICE — SENSOR OXIMETER ADULT SPO2 RD SET (20EA/BX)

## (undated) DEVICE — SYRINGE SAFETY 5 ML 18 GA X 1-1/2 BLUNT LL (100/BX 4BX/CA)

## (undated) DEVICE — CATHETER IV SAFETY 20 GA X 1-1/4 (50/BX)

## (undated) DEVICE — CANNULA O2 COMFORT SOFT EAR ADULT 7 FT TUBING (50/CA)

## (undated) DEVICE — TUBE E-T HI-LO CUFF 7.0MM (10EA/PK)

## (undated) DEVICE — LACTATED RINGERS INJ 1000 ML - (14EA/CA 60CA/PF)

## (undated) DEVICE — SYRINGE SAFETY 10 ML 18 GA X 1 1/2 BLUNT LL (100/BX 4BX/CA)

## (undated) DEVICE — TUBE E-T HI-LO CUFF 8.5MM (10EA/PK)

## (undated) DEVICE — SYRINGE DISP. 60 CC LL - (30/BX, 12BX/CA)**WHEN THESE ARE GONE ORDER #500206**

## (undated) DEVICE — KIT  I.V. START (100EA/CA)

## (undated) DEVICE — GOWN SURGEONS LARGE - (32/CA)

## (undated) DEVICE — SPONGE GAUZESTER 4 X 4 4PLY - (128PK/CA)

## (undated) DEVICE — TUBE E-T HI-LO CUFF 6.5MM (10EA/BX)

## (undated) DEVICE — MASK WITH FACE SHIELD (25/BX 4BX/CA)

## (undated) DEVICE — TUBE E-T HI-LO CUFF 8.0MM (10EA/PK)

## (undated) DEVICE — SUCTION INSTRUMENT YANKAUER BULBOUS TIP W/O VENT (50EA/CA)

## (undated) DEVICE — CATHETER IV SAFETY 24 GA X 3/4 (50EA/BX)

## (undated) DEVICE — COVER LIGHT HANDLE ALC PLUS DISP (18EA/BX)

## (undated) DEVICE — CANISTER SUCTION RIGID RED 1500CC (40EA/CA)

## (undated) DEVICE — CANISTER SUCTION 3000ML MECHANICAL FILTER AUTO SHUTOFF MEDI-VAC NONSTERILE LF DISP  (40EA/CA)

## (undated) DEVICE — TUBE SUCTION YANKAUER  1/4 X 6FT (20EA/CA)"

## (undated) DEVICE — COVER LIGHT HANDLE FLEXIBLE - SOFT (2EA/PK 80PK/CA)

## (undated) DEVICE — TUBE CONNECTING SUCTION - CLEAR PLASTIC STERILE 72 IN (50EA/CA)

## (undated) DEVICE — SPONGE GAUZE NON-STERILE 4X4 - (2000/CA 10PK/CA)

## (undated) DEVICE — SET EXTENSION WITH 2 PORTS (48EA/CA) ***PART #2C8610 IS A SUBSTITUTE*****